# Patient Record
Sex: MALE | Race: ASIAN | NOT HISPANIC OR LATINO
[De-identification: names, ages, dates, MRNs, and addresses within clinical notes are randomized per-mention and may not be internally consistent; named-entity substitution may affect disease eponyms.]

---

## 2017-09-18 ENCOUNTER — APPOINTMENT (OUTPATIENT)
Dept: PULMONOLOGY | Facility: CLINIC | Age: 72
End: 2017-09-18
Payer: MEDICAID

## 2017-09-18 PROCEDURE — G0009: CPT

## 2017-09-18 PROCEDURE — 90472 IMMUNIZATION ADMIN EACH ADD: CPT

## 2017-09-18 PROCEDURE — 90662 IIV NO PRSV INCREASED AG IM: CPT

## 2017-09-18 PROCEDURE — 90670 PCV13 VACCINE IM: CPT

## 2017-09-18 PROCEDURE — 99214 OFFICE O/P EST MOD 30 MIN: CPT | Mod: 25

## 2017-10-11 ENCOUNTER — APPOINTMENT (OUTPATIENT)
Dept: HEART AND VASCULAR | Facility: CLINIC | Age: 72
End: 2017-10-11
Payer: MEDICAID

## 2017-10-11 VITALS — HEART RATE: 73 BPM | DIASTOLIC BLOOD PRESSURE: 73 MMHG | SYSTOLIC BLOOD PRESSURE: 126 MMHG | OXYGEN SATURATION: 99 %

## 2017-10-11 PROCEDURE — 99214 OFFICE O/P EST MOD 30 MIN: CPT

## 2017-10-20 ENCOUNTER — APPOINTMENT (OUTPATIENT)
Dept: PULMONOLOGY | Facility: CLINIC | Age: 72
End: 2017-10-20
Payer: MEDICAID

## 2017-10-20 PROCEDURE — G0008: CPT

## 2017-10-20 PROCEDURE — 90662 IIV NO PRSV INCREASED AG IM: CPT

## 2017-10-20 PROCEDURE — 94010 BREATHING CAPACITY TEST: CPT

## 2017-10-20 PROCEDURE — ZZZZZ: CPT

## 2017-10-20 PROCEDURE — 99214 OFFICE O/P EST MOD 30 MIN: CPT | Mod: 25

## 2017-10-20 PROCEDURE — 94729 DIFFUSING CAPACITY: CPT

## 2017-11-16 ENCOUNTER — FORM ENCOUNTER (OUTPATIENT)
Age: 72
End: 2017-11-16

## 2017-11-17 ENCOUNTER — OUTPATIENT (OUTPATIENT)
Dept: OUTPATIENT SERVICES | Facility: HOSPITAL | Age: 72
LOS: 1 days | End: 2017-11-17
Payer: MEDICAID

## 2017-11-17 DIAGNOSIS — R07.9 CHEST PAIN, UNSPECIFIED: ICD-10-CM

## 2017-11-17 PROCEDURE — A9505: CPT

## 2017-11-17 PROCEDURE — 93017 CV STRESS TEST TRACING ONLY: CPT

## 2017-11-17 PROCEDURE — 78452 HT MUSCLE IMAGE SPECT MULT: CPT

## 2017-11-17 PROCEDURE — 93018 CV STRESS TEST I&R ONLY: CPT

## 2017-11-17 PROCEDURE — 93016 CV STRESS TEST SUPVJ ONLY: CPT

## 2017-11-17 PROCEDURE — 78452 HT MUSCLE IMAGE SPECT MULT: CPT | Mod: 26

## 2017-11-17 PROCEDURE — A9500: CPT

## 2018-02-17 ENCOUNTER — EMERGENCY (EMERGENCY)
Facility: HOSPITAL | Age: 73
LOS: 1 days | Discharge: HOME | End: 2018-02-17
Attending: EMERGENCY MEDICINE | Admitting: EMERGENCY MEDICINE
Payer: MEDICAID

## 2018-02-17 VITALS
TEMPERATURE: 98 F | HEIGHT: 63 IN | DIASTOLIC BLOOD PRESSURE: 67 MMHG | HEART RATE: 110 BPM | SYSTOLIC BLOOD PRESSURE: 109 MMHG | WEIGHT: 138.01 LBS | RESPIRATION RATE: 20 BRPM | OXYGEN SATURATION: 100 %

## 2018-02-17 VITALS
SYSTOLIC BLOOD PRESSURE: 135 MMHG | TEMPERATURE: 98 F | RESPIRATION RATE: 16 BRPM | HEART RATE: 88 BPM | OXYGEN SATURATION: 96 % | DIASTOLIC BLOOD PRESSURE: 79 MMHG

## 2018-02-17 DIAGNOSIS — Z87.891 PERSONAL HISTORY OF NICOTINE DEPENDENCE: ICD-10-CM

## 2018-02-17 DIAGNOSIS — I25.10 ATHEROSCLEROTIC HEART DISEASE OF NATIVE CORONARY ARTERY WITHOUT ANGINA PECTORIS: ICD-10-CM

## 2018-02-17 DIAGNOSIS — Z79.899 OTHER LONG TERM (CURRENT) DRUG THERAPY: ICD-10-CM

## 2018-02-17 DIAGNOSIS — I10 ESSENTIAL (PRIMARY) HYPERTENSION: ICD-10-CM

## 2018-02-17 DIAGNOSIS — Z88.2 ALLERGY STATUS TO SULFONAMIDES: ICD-10-CM

## 2018-02-17 DIAGNOSIS — R05 COUGH: ICD-10-CM

## 2018-02-17 DIAGNOSIS — J44.9 CHRONIC OBSTRUCTIVE PULMONARY DISEASE, UNSPECIFIED: ICD-10-CM

## 2018-02-17 DIAGNOSIS — E78.5 HYPERLIPIDEMIA, UNSPECIFIED: ICD-10-CM

## 2018-02-17 LAB
ALBUMIN SERPL ELPH-MCNC: 4.3 G/DL — SIGNIFICANT CHANGE UP (ref 3.3–5)
ALP SERPL-CCNC: 63 U/L — SIGNIFICANT CHANGE UP (ref 40–120)
ALT FLD-CCNC: 25 U/L — SIGNIFICANT CHANGE UP (ref 10–45)
ANION GAP SERPL CALC-SCNC: 13 MMOL/L — SIGNIFICANT CHANGE UP (ref 5–17)
APTT BLD: 27.7 SEC — SIGNIFICANT CHANGE UP (ref 27.5–37.4)
AST SERPL-CCNC: 23 U/L — SIGNIFICANT CHANGE UP (ref 10–40)
BASOPHILS NFR BLD AUTO: 0.3 % — SIGNIFICANT CHANGE UP (ref 0–2)
BILIRUB SERPL-MCNC: 0.4 MG/DL — SIGNIFICANT CHANGE UP (ref 0.2–1.2)
BUN SERPL-MCNC: 18 MG/DL — SIGNIFICANT CHANGE UP (ref 7–23)
CALCIUM SERPL-MCNC: 9.7 MG/DL — SIGNIFICANT CHANGE UP (ref 8.4–10.5)
CHLORIDE SERPL-SCNC: 91 MMOL/L — LOW (ref 96–108)
CO2 SERPL-SCNC: 27 MMOL/L — SIGNIFICANT CHANGE UP (ref 22–31)
CREAT SERPL-MCNC: 1.39 MG/DL — HIGH (ref 0.5–1.3)
EOSINOPHIL NFR BLD AUTO: 2.2 % — SIGNIFICANT CHANGE UP (ref 0–6)
GLUCOSE SERPL-MCNC: 162 MG/DL — HIGH (ref 70–99)
HCT VFR BLD CALC: 37 % — LOW (ref 39–50)
HGB BLD-MCNC: 12.8 G/DL — LOW (ref 13–17)
INR BLD: 1.16 — SIGNIFICANT CHANGE UP (ref 0.88–1.16)
LYMPHOCYTES # BLD AUTO: 19.7 % — SIGNIFICANT CHANGE UP (ref 13–44)
MCHC RBC-ENTMCNC: 31.1 PG — SIGNIFICANT CHANGE UP (ref 27–34)
MCHC RBC-ENTMCNC: 34.6 G/DL — SIGNIFICANT CHANGE UP (ref 32–36)
MCV RBC AUTO: 90 FL — SIGNIFICANT CHANGE UP (ref 80–100)
MONOCYTES NFR BLD AUTO: 12.2 % — SIGNIFICANT CHANGE UP (ref 2–14)
NEUTROPHILS NFR BLD AUTO: 65.6 % — SIGNIFICANT CHANGE UP (ref 43–77)
PLATELET # BLD AUTO: 197 K/UL — SIGNIFICANT CHANGE UP (ref 150–400)
POTASSIUM SERPL-MCNC: 3.7 MMOL/L — SIGNIFICANT CHANGE UP (ref 3.5–5.3)
POTASSIUM SERPL-SCNC: 3.7 MMOL/L — SIGNIFICANT CHANGE UP (ref 3.5–5.3)
PROT SERPL-MCNC: 7.7 G/DL — SIGNIFICANT CHANGE UP (ref 6–8.3)
PROTHROM AB SERPL-ACNC: 12.9 SEC — HIGH (ref 9.8–12.7)
RAPID RVP RESULT: SIGNIFICANT CHANGE UP
RBC # BLD: 4.11 M/UL — LOW (ref 4.2–5.8)
RBC # FLD: 13 % — SIGNIFICANT CHANGE UP (ref 10.3–16.9)
SODIUM SERPL-SCNC: 131 MMOL/L — LOW (ref 135–145)
WBC # BLD: 9.6 K/UL — SIGNIFICANT CHANGE UP (ref 3.8–10.5)
WBC # FLD AUTO: 9.6 K/UL — SIGNIFICANT CHANGE UP (ref 3.8–10.5)

## 2018-02-17 PROCEDURE — 85610 PROTHROMBIN TIME: CPT

## 2018-02-17 PROCEDURE — 87040 BLOOD CULTURE FOR BACTERIA: CPT

## 2018-02-17 PROCEDURE — 87798 DETECT AGENT NOS DNA AMP: CPT

## 2018-02-17 PROCEDURE — 87633 RESP VIRUS 12-25 TARGETS: CPT

## 2018-02-17 PROCEDURE — 85730 THROMBOPLASTIN TIME PARTIAL: CPT

## 2018-02-17 PROCEDURE — 99285 EMERGENCY DEPT VISIT HI MDM: CPT | Mod: 25

## 2018-02-17 PROCEDURE — 93005 ELECTROCARDIOGRAM TRACING: CPT

## 2018-02-17 PROCEDURE — 96374 THER/PROPH/DIAG INJ IV PUSH: CPT

## 2018-02-17 PROCEDURE — 87486 CHLMYD PNEUM DNA AMP PROBE: CPT

## 2018-02-17 PROCEDURE — 71045 X-RAY EXAM CHEST 1 VIEW: CPT | Mod: 26

## 2018-02-17 PROCEDURE — 94640 AIRWAY INHALATION TREATMENT: CPT

## 2018-02-17 PROCEDURE — 80053 COMPREHEN METABOLIC PANEL: CPT

## 2018-02-17 PROCEDURE — 36415 COLL VENOUS BLD VENIPUNCTURE: CPT

## 2018-02-17 PROCEDURE — 71045 X-RAY EXAM CHEST 1 VIEW: CPT

## 2018-02-17 PROCEDURE — 99285 EMERGENCY DEPT VISIT HI MDM: CPT

## 2018-02-17 PROCEDURE — 87581 M.PNEUMON DNA AMP PROBE: CPT

## 2018-02-17 PROCEDURE — 85025 COMPLETE CBC W/AUTO DIFF WBC: CPT

## 2018-02-17 RX ORDER — IPRATROPIUM/ALBUTEROL SULFATE 18-103MCG
3 AEROSOL WITH ADAPTER (GRAM) INHALATION
Qty: 0 | Refills: 0 | Status: COMPLETED | OUTPATIENT
Start: 2018-02-17 | End: 2018-02-17

## 2018-02-17 RX ORDER — SODIUM CHLORIDE 9 MG/ML
1000 INJECTION INTRAMUSCULAR; INTRAVENOUS; SUBCUTANEOUS ONCE
Qty: 0 | Refills: 0 | Status: COMPLETED | OUTPATIENT
Start: 2018-02-17 | End: 2018-02-17

## 2018-02-17 RX ORDER — ISOSORBIDE MONONITRATE 60 MG/1
1 TABLET, EXTENDED RELEASE ORAL
Qty: 0 | Refills: 0 | COMMUNITY

## 2018-02-17 RX ORDER — LOSARTAN/HYDROCHLOROTHIAZIDE 100MG-25MG
1 TABLET ORAL
Qty: 0 | Refills: 0 | COMMUNITY

## 2018-02-17 RX ADMIN — SODIUM CHLORIDE 1000 MILLILITER(S): 9 INJECTION INTRAMUSCULAR; INTRAVENOUS; SUBCUTANEOUS at 14:20

## 2018-02-17 RX ADMIN — Medication 125 MILLIGRAM(S): at 12:20

## 2018-02-17 RX ADMIN — Medication 3 MILLILITER(S): at 12:31

## 2018-02-17 RX ADMIN — Medication 3 MILLILITER(S): at 12:39

## 2018-02-17 RX ADMIN — Medication 3 MILLILITER(S): at 12:21

## 2018-02-17 NOTE — ED PROVIDER NOTE - OBJECTIVE STATEMENT
73 y/o m with PMH of COPD, HTN, HLD, CAD, TIA presents to ED with cough, mild shortness of breath.  Denies fever or chills.  Presents with son - has been using nebulizer at home with some relief.

## 2018-02-17 NOTE — ED ADULT NURSE NOTE - OBJECTIVE STATEMENT
sick with cough and yellow sputum for 2 weeks and has been on antibiotics - not working - [facility]

## 2018-02-17 NOTE — ED ADULT NURSE REASSESSMENT NOTE - NS ED NURSE REASSESS COMMENT FT1
Patient a/oX 3, symptoms improved, no chest pain or SOB, no wheezing or coughing.  Vital signs stable.  Discharged to home in stable condtion.

## 2018-02-17 NOTE — ED PROVIDER NOTE - MEDICAL DECISION MAKING DETAILS
pt with copd  presents with cough and shortness of breath, pt afebrile workup shows copd exacerbation without infection, feels better with nebs and steroids in ED will follow up with Ede this week

## 2018-02-17 NOTE — ED ADULT NURSE REASSESSMENT NOTE - NURSING SKIN INCISION LOCATION
abdomen/upper incision approximated with staples in place, 1 cm area just above umbilicus open with small amount oozing

## 2018-02-17 NOTE — ED ADULT NURSE NOTE - PMH
Atherosclerosis of coronary artery  Coronary artery disease  Benign prostatic hypertrophy without lower urinary tract symptoms  BPH (benign prostatic hypertrophy)  Chronic obstructive pulmonary disease  COPD (chronic obstructive pulmonary disease)  Constipation  Constipation  Disease of pericardium  Pericardial effusion without cardiac tamponade  Essential hypertension  Hypertension  Hyperlipidemia  Hyperlipidemia  Transient ischemic attack (TIA), and cerebral infarction without residual deficits  History of CVA (cerebrovascular accident) without residual deficits

## 2018-02-22 LAB
CULTURE RESULTS: SIGNIFICANT CHANGE UP
CULTURE RESULTS: SIGNIFICANT CHANGE UP
SPECIMEN SOURCE: SIGNIFICANT CHANGE UP
SPECIMEN SOURCE: SIGNIFICANT CHANGE UP

## 2018-05-08 VITALS
OXYGEN SATURATION: 98 % | TEMPERATURE: 98 F | DIASTOLIC BLOOD PRESSURE: 68 MMHG | SYSTOLIC BLOOD PRESSURE: 105 MMHG | RESPIRATION RATE: 18 BRPM | WEIGHT: 138.89 LBS | HEART RATE: 68 BPM

## 2018-05-08 PROCEDURE — 99285 EMERGENCY DEPT VISIT HI MDM: CPT | Mod: 25

## 2018-05-08 PROCEDURE — 71046 X-RAY EXAM CHEST 2 VIEWS: CPT | Mod: 26

## 2018-05-08 PROCEDURE — 93010 ELECTROCARDIOGRAM REPORT: CPT

## 2018-05-08 RX ORDER — FAMOTIDINE 10 MG/ML
20 INJECTION INTRAVENOUS ONCE
Qty: 0 | Refills: 0 | Status: COMPLETED | OUTPATIENT
Start: 2018-05-08 | End: 2018-05-08

## 2018-05-08 RX ADMIN — Medication 30 MILLILITER(S): at 23:42

## 2018-05-08 RX ADMIN — FAMOTIDINE 20 MILLIGRAM(S): 10 INJECTION INTRAVENOUS at 23:42

## 2018-05-08 NOTE — ED PROVIDER NOTE - OBJECTIVE STATEMENT
73M hx htn, cva (L eye vision loss), BPH, CAD (stents), c/o epigastric pain.  states pain on and off xpast week.  occasional chest pain along with SOB. pt states feeling lightheaded.  no vomiting, no fevers. no diarrhea. no LE swelling. states has appt with Dr. Quiroga tomorrow.

## 2018-05-08 NOTE — ED PROVIDER NOTE - MEDICAL DECISION MAKING DETAILS
epigastric abd pain, intermittent chest pain x1 week  -check labs, pepcid, maalox, cxr, ekg epigastric abd pain, intermittent chest pain x1 week, no guarding no rebound, no evidence of surgical abd at this time.  -check labs, pepcid, maalox, cxr, ekg

## 2018-05-08 NOTE — ED ADULT NURSE NOTE - OBJECTIVE STATEMENT
72 y/o male with hx of cardiac stents arrived to [facility] ER reporting intermittent chest pain accompanied with abdominal pain for the past week. Upon assessment, abdomen tender, lung fields WNL, breathing is equal and unlabored, pulses palpable, no visible injuries noted. Pt denies headache, dizziness, blurred visio, slurred speech, diarrhea, fever, chills, LOC, SOB, weakness, fatigue, recent travel, recent injury, and palpitations. Care in progress. Awaiting disposition

## 2018-05-08 NOTE — ED PROVIDER NOTE - PROGRESS NOTE DETAILS
spoke with Dr. Quiroga - recommend admission to tele for continued monitoring.  states will see him in the AM and will likely get CT coronary angio.

## 2018-05-08 NOTE — ED PROVIDER NOTE - PSH
Acquired absence of genital organ  S/P orchiectomy  H/O heart artery stent    Other postprocedural status  Cath @ [facility] 5/19/14: 80% pLAD, 70% oD1, widely patent stents in mLCx, RPDA & RPLS. Successful PTCA KARAN pLAD & PTCA ostial D1.  Status post aorto-coronary artery bypass graft  LIMA - LAD midCAB 2/2014 @ [facility]

## 2018-05-09 ENCOUNTER — TRANSCRIPTION ENCOUNTER (OUTPATIENT)
Age: 73
End: 2018-05-09

## 2018-05-09 ENCOUNTER — INPATIENT (INPATIENT)
Facility: HOSPITAL | Age: 73
LOS: 0 days | Discharge: HOME | DRG: 392 | End: 2018-05-09
Attending: INTERNAL MEDICINE | Admitting: INTERNAL MEDICINE
Payer: MEDICAID

## 2018-05-09 VITALS
RESPIRATION RATE: 16 BRPM | DIASTOLIC BLOOD PRESSURE: 69 MMHG | OXYGEN SATURATION: 97 % | HEART RATE: 88 BPM | SYSTOLIC BLOOD PRESSURE: 105 MMHG

## 2018-05-09 DIAGNOSIS — R63.8 OTHER SYMPTOMS AND SIGNS CONCERNING FOOD AND FLUID INTAKE: ICD-10-CM

## 2018-05-09 DIAGNOSIS — J44.9 CHRONIC OBSTRUCTIVE PULMONARY DISEASE, UNSPECIFIED: ICD-10-CM

## 2018-05-09 DIAGNOSIS — Z02.9 ENCOUNTER FOR ADMINISTRATIVE EXAMINATIONS, UNSPECIFIED: ICD-10-CM

## 2018-05-09 DIAGNOSIS — Z29.8 ENCOUNTER FOR OTHER SPECIFIED PROPHYLACTIC MEASURES: ICD-10-CM

## 2018-05-09 DIAGNOSIS — R10.13 EPIGASTRIC PAIN: ICD-10-CM

## 2018-05-09 DIAGNOSIS — I10 ESSENTIAL (PRIMARY) HYPERTENSION: ICD-10-CM

## 2018-05-09 DIAGNOSIS — I25.10 ATHEROSCLEROTIC HEART DISEASE OF NATIVE CORONARY ARTERY WITHOUT ANGINA PECTORIS: ICD-10-CM

## 2018-05-09 DIAGNOSIS — N40.0 BENIGN PROSTATIC HYPERPLASIA WITHOUT LOWER URINARY TRACT SYMPTOMS: ICD-10-CM

## 2018-05-09 DIAGNOSIS — K59.00 CONSTIPATION, UNSPECIFIED: ICD-10-CM

## 2018-05-09 DIAGNOSIS — Z95.5 PRESENCE OF CORONARY ANGIOPLASTY IMPLANT AND GRAFT: Chronic | ICD-10-CM

## 2018-05-09 LAB
ANION GAP SERPL CALC-SCNC: 15 MMOL/L — SIGNIFICANT CHANGE UP (ref 5–17)
APPEARANCE UR: CLEAR — SIGNIFICANT CHANGE UP
BILIRUB UR-MCNC: NEGATIVE — SIGNIFICANT CHANGE UP
BUN SERPL-MCNC: 18 MG/DL — SIGNIFICANT CHANGE UP (ref 7–23)
CALCIUM SERPL-MCNC: 9.1 MG/DL — SIGNIFICANT CHANGE UP (ref 8.4–10.5)
CHLORIDE SERPL-SCNC: 98 MMOL/L — SIGNIFICANT CHANGE UP (ref 96–108)
CHOLEST SERPL-MCNC: 107 MG/DL — SIGNIFICANT CHANGE UP (ref 10–199)
CK MB CFR SERPL CALC: 2 NG/ML — SIGNIFICANT CHANGE UP (ref 0–6.7)
CK SERPL-CCNC: 156 U/L — SIGNIFICANT CHANGE UP (ref 30–200)
CO2 SERPL-SCNC: 23 MMOL/L — SIGNIFICANT CHANGE UP (ref 22–31)
COLOR SPEC: YELLOW — SIGNIFICANT CHANGE UP
CREAT ?TM UR-MCNC: 66 MG/DL — SIGNIFICANT CHANGE UP
CREAT SERPL-MCNC: 1.36 MG/DL — HIGH (ref 0.5–1.3)
DIFF PNL FLD: NEGATIVE — SIGNIFICANT CHANGE UP
GLUCOSE SERPL-MCNC: 108 MG/DL — HIGH (ref 70–99)
GLUCOSE UR QL: NEGATIVE — SIGNIFICANT CHANGE UP
HBA1C BLD-MCNC: 5.4 % — SIGNIFICANT CHANGE UP (ref 4–5.6)
HCT VFR BLD CALC: 39 % — SIGNIFICANT CHANGE UP (ref 39–50)
HDLC SERPL-MCNC: 44 MG/DL — SIGNIFICANT CHANGE UP (ref 40–125)
HGB BLD-MCNC: 13.2 G/DL — SIGNIFICANT CHANGE UP (ref 13–17)
KETONES UR-MCNC: NEGATIVE — SIGNIFICANT CHANGE UP
LEUKOCYTE ESTERASE UR-ACNC: NEGATIVE — SIGNIFICANT CHANGE UP
LIPID PNL WITH DIRECT LDL SERPL: 49 MG/DL — SIGNIFICANT CHANGE UP
MAGNESIUM SERPL-MCNC: 2 MG/DL — SIGNIFICANT CHANGE UP (ref 1.6–2.6)
MCHC RBC-ENTMCNC: 30.6 PG — SIGNIFICANT CHANGE UP (ref 27–34)
MCHC RBC-ENTMCNC: 33.8 G/DL — SIGNIFICANT CHANGE UP (ref 32–36)
MCV RBC AUTO: 90.3 FL — SIGNIFICANT CHANGE UP (ref 80–100)
NITRITE UR-MCNC: NEGATIVE — SIGNIFICANT CHANGE UP
PH UR: 6 — SIGNIFICANT CHANGE UP (ref 5–8)
PHOSPHATE SERPL-MCNC: 3.8 MG/DL — SIGNIFICANT CHANGE UP (ref 2.5–4.5)
PLATELET # BLD AUTO: 192 K/UL — SIGNIFICANT CHANGE UP (ref 150–400)
POTASSIUM SERPL-MCNC: 3.8 MMOL/L — SIGNIFICANT CHANGE UP (ref 3.5–5.3)
POTASSIUM SERPL-SCNC: 3.8 MMOL/L — SIGNIFICANT CHANGE UP (ref 3.5–5.3)
PROT UR-MCNC: NEGATIVE MG/DL — SIGNIFICANT CHANGE UP
RBC # BLD: 4.32 M/UL — SIGNIFICANT CHANGE UP (ref 4.2–5.8)
RBC # FLD: 13 % — SIGNIFICANT CHANGE UP (ref 10.3–16.9)
SODIUM SERPL-SCNC: 136 MMOL/L — SIGNIFICANT CHANGE UP (ref 135–145)
SODIUM UR-SCNC: 108 MMOL/L — SIGNIFICANT CHANGE UP
SP GR SPEC: 1.01 — SIGNIFICANT CHANGE UP (ref 1–1.03)
TOTAL CHOLESTEROL/HDL RATIO MEASUREMENT: 2.4 RATIO — LOW (ref 3.4–9.6)
TRIGL SERPL-MCNC: 69 MG/DL — SIGNIFICANT CHANGE UP (ref 10–149)
TROPONIN T SERPL-MCNC: <0.01 NG/ML — SIGNIFICANT CHANGE UP (ref 0–0.01)
TSH SERPL-MCNC: 2.23 UIU/ML — SIGNIFICANT CHANGE UP (ref 0.35–4.94)
UROBILINOGEN FLD QL: 0.2 E.U./DL — SIGNIFICANT CHANGE UP
UUN UR-MCNC: 351 MG/DL — SIGNIFICANT CHANGE UP
WBC # BLD: 6.4 K/UL — SIGNIFICANT CHANGE UP (ref 3.8–10.5)
WBC # FLD AUTO: 6.4 K/UL — SIGNIFICANT CHANGE UP (ref 3.8–10.5)

## 2018-05-09 PROCEDURE — 74177 CT ABD & PELVIS W/CONTRAST: CPT | Mod: 26

## 2018-05-09 PROCEDURE — 99238 HOSP IP/OBS DSCHRG MGMT 30/<: CPT

## 2018-05-09 RX ORDER — DIATRIZOATE MEGLUMINE 180 MG/ML
30 INJECTION, SOLUTION INTRAVESICAL ONCE
Qty: 0 | Refills: 0 | Status: COMPLETED | OUTPATIENT
Start: 2018-05-09 | End: 2018-05-09

## 2018-05-09 RX ORDER — IPRATROPIUM/ALBUTEROL SULFATE 18-103MCG
3 AEROSOL WITH ADAPTER (GRAM) INHALATION EVERY 6 HOURS
Qty: 0 | Refills: 0 | Status: DISCONTINUED | OUTPATIENT
Start: 2018-05-09 | End: 2018-05-09

## 2018-05-09 RX ORDER — FUROSEMIDE 40 MG
40 TABLET ORAL DAILY
Qty: 0 | Refills: 0 | Status: DISCONTINUED | OUTPATIENT
Start: 2018-05-09 | End: 2018-05-09

## 2018-05-09 RX ORDER — CLOPIDOGREL BISULFATE 75 MG/1
75 TABLET, FILM COATED ORAL DAILY
Qty: 0 | Refills: 0 | Status: DISCONTINUED | OUTPATIENT
Start: 2018-05-09 | End: 2018-05-09

## 2018-05-09 RX ORDER — METOPROLOL TARTRATE 50 MG
100 TABLET ORAL DAILY
Qty: 0 | Refills: 0 | Status: DISCONTINUED | OUTPATIENT
Start: 2018-05-09 | End: 2018-05-09

## 2018-05-09 RX ORDER — HEPARIN SODIUM 5000 [USP'U]/ML
5000 INJECTION INTRAVENOUS; SUBCUTANEOUS EVERY 8 HOURS
Qty: 0 | Refills: 0 | Status: DISCONTINUED | OUTPATIENT
Start: 2018-05-09 | End: 2018-05-09

## 2018-05-09 RX ORDER — ASPIRIN/CALCIUM CARB/MAGNESIUM 324 MG
81 TABLET ORAL DAILY
Qty: 0 | Refills: 0 | Status: DISCONTINUED | OUTPATIENT
Start: 2018-05-09 | End: 2018-05-09

## 2018-05-09 RX ORDER — MONTELUKAST 4 MG/1
10 TABLET, CHEWABLE ORAL DAILY
Qty: 0 | Refills: 0 | Status: DISCONTINUED | OUTPATIENT
Start: 2018-05-09 | End: 2018-05-09

## 2018-05-09 RX ORDER — RANOLAZINE 500 MG/1
500 TABLET, FILM COATED, EXTENDED RELEASE ORAL
Qty: 0 | Refills: 0 | Status: DISCONTINUED | OUTPATIENT
Start: 2018-05-09 | End: 2018-05-09

## 2018-05-09 RX ORDER — LOSARTAN POTASSIUM 100 MG/1
100 TABLET, FILM COATED ORAL DAILY
Qty: 0 | Refills: 0 | Status: DISCONTINUED | OUTPATIENT
Start: 2018-05-09 | End: 2018-05-09

## 2018-05-09 RX ORDER — TAMSULOSIN HYDROCHLORIDE 0.4 MG/1
0.8 CAPSULE ORAL AT BEDTIME
Qty: 0 | Refills: 0 | Status: DISCONTINUED | OUTPATIENT
Start: 2018-05-09 | End: 2018-05-09

## 2018-05-09 RX ORDER — HYDROCHLOROTHIAZIDE 25 MG
12.5 TABLET ORAL DAILY
Qty: 0 | Refills: 0 | Status: DISCONTINUED | OUTPATIENT
Start: 2018-05-09 | End: 2018-05-09

## 2018-05-09 RX ORDER — PANTOPRAZOLE SODIUM 20 MG/1
1 TABLET, DELAYED RELEASE ORAL
Qty: 0 | Refills: 0 | COMMUNITY
Start: 2018-05-09

## 2018-05-09 RX ORDER — PANTOPRAZOLE SODIUM 20 MG/1
40 TABLET, DELAYED RELEASE ORAL
Qty: 0 | Refills: 0 | Status: DISCONTINUED | OUTPATIENT
Start: 2018-05-09 | End: 2018-05-09

## 2018-05-09 RX ORDER — POLYETHYLENE GLYCOL 3350 17 G/17G
17 POWDER, FOR SOLUTION ORAL DAILY
Qty: 0 | Refills: 0 | Status: DISCONTINUED | OUTPATIENT
Start: 2018-05-09 | End: 2018-05-09

## 2018-05-09 RX ORDER — ISOSORBIDE MONONITRATE 60 MG/1
30 TABLET, EXTENDED RELEASE ORAL DAILY
Qty: 0 | Refills: 0 | Status: DISCONTINUED | OUTPATIENT
Start: 2018-05-09 | End: 2018-05-09

## 2018-05-09 RX ORDER — POTASSIUM CHLORIDE 20 MEQ
40 PACKET (EA) ORAL ONCE
Qty: 0 | Refills: 0 | Status: COMPLETED | OUTPATIENT
Start: 2018-05-09 | End: 2018-05-09

## 2018-05-09 RX ORDER — ATORVASTATIN CALCIUM 80 MG/1
80 TABLET, FILM COATED ORAL AT BEDTIME
Qty: 0 | Refills: 0 | Status: DISCONTINUED | OUTPATIENT
Start: 2018-05-09 | End: 2018-05-09

## 2018-05-09 RX ORDER — ACETAMINOPHEN 500 MG
650 TABLET ORAL EVERY 6 HOURS
Qty: 0 | Refills: 0 | Status: DISCONTINUED | OUTPATIENT
Start: 2018-05-09 | End: 2018-05-09

## 2018-05-09 RX ADMIN — Medication 650 MILLIGRAM(S): at 01:44

## 2018-05-09 RX ADMIN — HEPARIN SODIUM 5000 UNIT(S): 5000 INJECTION INTRAVENOUS; SUBCUTANEOUS at 21:32

## 2018-05-09 RX ADMIN — POLYETHYLENE GLYCOL 3350 17 GRAM(S): 17 POWDER, FOR SOLUTION ORAL at 01:44

## 2018-05-09 RX ADMIN — Medication 3 MILLILITER(S): at 04:40

## 2018-05-09 RX ADMIN — TAMSULOSIN HYDROCHLORIDE 0.8 MILLIGRAM(S): 0.4 CAPSULE ORAL at 21:32

## 2018-05-09 RX ADMIN — HEPARIN SODIUM 5000 UNIT(S): 5000 INJECTION INTRAVENOUS; SUBCUTANEOUS at 16:08

## 2018-05-09 RX ADMIN — Medication 12.5 MILLIGRAM(S): at 05:38

## 2018-05-09 RX ADMIN — Medication 81 MILLIGRAM(S): at 10:39

## 2018-05-09 RX ADMIN — DIATRIZOATE MEGLUMINE 30 MILLILITER(S): 180 INJECTION, SOLUTION INTRAVESICAL at 12:21

## 2018-05-09 RX ADMIN — Medication 3 MILLILITER(S): at 21:37

## 2018-05-09 RX ADMIN — RANOLAZINE 500 MILLIGRAM(S): 500 TABLET, FILM COATED, EXTENDED RELEASE ORAL at 19:28

## 2018-05-09 RX ADMIN — Medication 40 MILLIEQUIVALENT(S): at 19:28

## 2018-05-09 RX ADMIN — Medication 40 MILLIGRAM(S): at 05:38

## 2018-05-09 RX ADMIN — PANTOPRAZOLE SODIUM 40 MILLIGRAM(S): 20 TABLET, DELAYED RELEASE ORAL at 05:38

## 2018-05-09 RX ADMIN — Medication 650 MILLIGRAM(S): at 02:15

## 2018-05-09 RX ADMIN — CLOPIDOGREL BISULFATE 75 MILLIGRAM(S): 75 TABLET, FILM COATED ORAL at 10:39

## 2018-05-09 RX ADMIN — HEPARIN SODIUM 5000 UNIT(S): 5000 INJECTION INTRAVENOUS; SUBCUTANEOUS at 05:39

## 2018-05-09 RX ADMIN — Medication 100 MILLIGRAM(S): at 19:28

## 2018-05-09 RX ADMIN — RANOLAZINE 500 MILLIGRAM(S): 500 TABLET, FILM COATED, EXTENDED RELEASE ORAL at 05:38

## 2018-05-09 RX ADMIN — LOSARTAN POTASSIUM 100 MILLIGRAM(S): 100 TABLET, FILM COATED ORAL at 10:39

## 2018-05-09 RX ADMIN — MONTELUKAST 10 MILLIGRAM(S): 4 TABLET, CHEWABLE ORAL at 19:28

## 2018-05-09 RX ADMIN — ISOSORBIDE MONONITRATE 30 MILLIGRAM(S): 60 TABLET, EXTENDED RELEASE ORAL at 10:39

## 2018-05-09 RX ADMIN — ATORVASTATIN CALCIUM 80 MILLIGRAM(S): 80 TABLET, FILM COATED ORAL at 21:32

## 2018-05-09 RX ADMIN — Medication 3 MILLILITER(S): at 10:40

## 2018-05-09 NOTE — DISCHARGE NOTE ADULT - PLAN OF CARE
Follow up with your PMD in 1-2 weeks You came into the hospital for epigastric pain. You had bloodwork and EKGs performed that DID NOT show you had a heart attack. You came into the hospital for epigastric pain. You had bloodwork and EKGs performed that DID NOT show you had a heart attack. You had a CT Abdomen/Pelvis performed. You came into the hospital for epigastric pain. You had bloodwork and EKGs performed that DID NOT show you had a heart attack. You had a CT Abdomen/Pelvis performed. Preliminary read showing no liver mass or lesion, small gallstones present in the gall bladder without obstruction or dilation, pancreas without inflammation or signs of pancreatitis, no spleen abnormalities, small renal cyst on R kidney, no hydronephrosis or ureteral stones, no colonic abnormalities.

## 2018-05-09 NOTE — PHYSICAL THERAPY INITIAL EVALUATION ADULT - CRITERIA FOR SKILLED THERAPEUTIC INTERVENTIONS
anticipated discharge recommendation/rehab potential/functional limitations in following categories/therapy frequency/impairments found

## 2018-05-09 NOTE — H&P ADULT - PROBLEM SELECTOR PLAN 2
Patient with history of CAD s/p multiple stents and CABG in 2014 presenting with intermittent chest pain/abdominal pain with occasional SOB. Given history concern for abnormal presentation of ACS.  - c/w Aspirin, Plavix, Atorvastatin  - c/w Lasix 40mg, HCTZ 12.5mg, Losartan 100mg, Imdur 30mg, Metoprolol 100mg  - Trend cardiac enzymes to ensure no elevation  - If worsening CP or increase suspicion for ACS will Aspirin and Plavix load  - f/u AM EKG  - CT coronary in AM

## 2018-05-09 NOTE — H&P ADULT - PROBLEM SELECTOR PLAN 1
Patient presenting with epigastric/chest pain, non-radiating, vague in nature and unable to further describe quality/quantity. Does not appear to be exertional in nature, though some associated shortness of breath. Pain reproducible with palpation on examination, mild elevation of lipase. Broad differential and given cardiac history concern for atypical presentation of ACS.  - Given Maalox and Famotidine in ED with mild relief  - Cardiac enzymes negative x1, trend to ensure no elevation  - Miralax for constipation relief  - Obtain CT abdomen/pelvis if concern for pancreatitis (though only mild Lipase elevation suggestive of alternative etiology)  - Trial of PPI

## 2018-05-09 NOTE — H&P ADULT - NSHPLABSRESULTS_GEN_ALL_CORE
.  LABS:                         13.5   7.3   )-----------( 202      ( 08 May 2018 22:43 )             38.8     05-08    137  |  100  |  18  ----------------------------<  119<H>  4.1   |  26  |  1.47<H>    Ca    9.4      08 May 2018 22:43    TPro  6.9  /  Alb  4.1  /  TBili  0.5  /  DBili  x   /  AST  30  /  ALT  24  /  AlkPhos  63  05-08    PT/INR - ( 08 May 2018 22:43 )   PT: 11.9 sec;   INR: 1.07          PTT - ( 08 May 2018 22:43 )  PTT:30.6 sec  Urinalysis Basic - ( 09 May 2018 00:43 )    Color: Yellow / Appearance: SL Cloudy / SG: <=1.005 / pH: x  Gluc: x / Ketone: NEGATIVE  / Bili: Negative / Urobili: 0.2 E.U./dL   Blood: x / Protein: NEGATIVE mg/dL / Nitrite: NEGATIVE   Leuk Esterase: NEGATIVE / RBC: x / WBC x   Sq Epi: x / Non Sq Epi: x / Bacteria: x      CARDIAC MARKERS ( 08 May 2018 22:43 )  x     / <0.01 ng/mL / 191 U/L / x     / 2.1 ng/mL            RADIOLOGY, EKG & ADDITIONAL TESTS: Reviewed.

## 2018-05-09 NOTE — H&P ADULT - ATTENDING COMMENTS
-pt seen and examined, atypical pain syndrome- he points to low mid epigastric area on my exam that is slightly worse to palpiations.   -the pain is not related to exertion, and/or food  -his cardiac enzymes are negative/ EKG unchanged   -doubt cardiac pain, to get a CT abd/ GI evaluation- ?pud

## 2018-05-09 NOTE — DISCHARGE NOTE ADULT - CARE PLAN
Principal Discharge DX:	Epigastric pain  Goal:	Follow up with your PMD in 1-2 weeks  Assessment and plan of treatment:	You came into the hospital for epigastric pain. You had bloodwork and EKGs performed that DID NOT show you had a heart attack. Principal Discharge DX:	Epigastric pain  Goal:	Follow up with your PMD in 1-2 weeks  Assessment and plan of treatment:	You came into the hospital for epigastric pain. You had bloodwork and EKGs performed that DID NOT show you had a heart attack. You had a CT Abdomen/Pelvis performed. Principal Discharge DX:	Epigastric pain  Goal:	Follow up with your PMD in 1-2 weeks  Assessment and plan of treatment:	You came into the hospital for epigastric pain. You had bloodwork and EKGs performed that DID NOT show you had a heart attack. You had a CT Abdomen/Pelvis performed. Preliminary read showing no liver mass or lesion, small gallstones present in the gall bladder without obstruction or dilation, pancreas without inflammation or signs of pancreatitis, no spleen abnormalities, small renal cyst on R kidney, no hydronephrosis or ureteral stones, no colonic abnormalities.

## 2018-05-09 NOTE — H&P ADULT - PROBLEM SELECTOR PLAN 3
Patient with history of HTN Patient with history of HTN on multiple agents at home per med requisition, currently normo/hypotensive with adequate MAPs  - c/w Lasix 40mg, HCTZ 12.5mg, Losartan 100mg, Imdur 30mg, Metoprolol 100mg

## 2018-05-09 NOTE — H&P ADULT - ASSESSMENT
Patient is a 72yo M with a PMHx of HTN, CVA w/residual L sided vision loss, BPH, CAD s/p CABG and stenting who presented complaining of chest pain/epigastric pain on and off over the past week, concerning for atypical presentation of ACS given cardiac history

## 2018-05-09 NOTE — H&P ADULT - HISTORY OF PRESENT ILLNESS
Patient is a Bengalese 72yo M with a PMHx of HTN, CVA w/residual L sided vision loss, BPH, CAD s/p CABG and stenting who presented complaining of chest pain/epigastric pain on and off over the past week. Location of pain varies, from chest to abdomen though does not radiate to neck/L arm. States some occasional assocated SOB and lightheadedness. Patient denies fevers, chills, cough, palpitations, abdominal distention, n/v/d/c, lower extremity edema, decreased exercise tolerance or orthopnea.    In the ED, VS T 97.7, HR 68, /68, R 18, SpO2 98% on room air. Patient without leukocytosis or anemia, normal coags, no electrolytes disturbances, Cr of 1.47, lipase of 91, negative cardiac enzymes, negative UA. CXR without infiltrate or effusion, EKG with TWI in lead I but otherwise unchanged from prior studies. Patient given Maalox and Famotidine and admitted to [facility] to rule out ACS.

## 2018-05-09 NOTE — H&P ADULT - NSHPPHYSICALEXAM_GEN_ALL_CORE
.  VITAL SIGNS:  T(C): 36.6 (05-09-18 @ 01:47), Max: 36.7 (05-09-18 @ 01:08)  T(F): 97.8 (05-09-18 @ 01:47), Max: 98 (05-09-18 @ 01:08)  HR: 64 (05-09-18 @ 01:08) (64 - 68)  BP: 105/59 (05-09-18 @ 01:08) (105/59 - 110/59)  BP(mean): 73 (05-09-18 @ 01:08) (73 - 73)  RR: 18 (05-09-18 @ 01:08) (18 - 18)  SpO2: 98% (05-09-18 @ 01:08) (98% - 98%)  Wt(kg): --    PHYSICAL EXAM:    Constitutional: Resting in bed, NAD though occasional moaning to indicate pain  Head: NC/AT  Eyes: PERRL, EOMI, anicteric sclera  ENT: no nasal discharge; uvula midline, no oropharyngeal erythema or exudates; MMM  Neck: supple; no JVD or thyromegaly  Respiratory: Faint bibasilar crackles, b/l expiratory wheezing  Cardiac: +S1/S2; RRR; no M/R/G; PMI non-displaced  Gastrointestinal: Mildly distended, dull to tympany, TTP in the epigastric region without rebound or guarding, +BS x4  Back: spine midline, no bony tenderness or step-offs; no CVAT B/L  Extremities: WWP, no clubbing or cyanosis; no peripheral edema  Musculoskeletal: NROM x4; no joint swelling, tenderness or erythema  Vascular: 2+ radial, femoral, DP/PT pulses B/L  Dermatologic: skin warm, dry and intact; no rashes, wounds, or scars  Lymphatic: no submandibular or cervical LAD

## 2018-05-09 NOTE — H&P ADULT - PSH
Acquired absence of genital organ  S/P orchiectomy  H/O heart artery stent    Other postprocedural status  Cath @ [facility] 5/19/14: 80% pLAD, 70% oD1, widely patent stents in mLCx, RPDA & RPLS. Successful PTCA KARAN pLAD & PTCA ostial D1.  Status post aorto-coronary artery bypass graft  LIMA - LAD midCAB 2/2014 @ [facility]

## 2018-05-09 NOTE — DISCHARGE NOTE ADULT - ADDITIONAL INSTRUCTIONS
1. Please follow up with your PMD in 1-2 weeks after discharge.    2. Please follow up with Dr Quiroga in 1-2 weeks.  Alvarez Quiroga (MD), Cardiology; Interventional Cardiology  [address] [place], [place] [address]  Phone: [phone] 1. Please follow up with your PMD and Gastroenterologist in 1-2 weeks after discharge.    2. Please follow up with Dr Quiroga in 1-2 weeks.  Alvarez Quiroga (MD), Cardiology; Interventional Cardiology  [address] [place], [place] [address]  Phone: [phone]

## 2018-05-09 NOTE — DISCHARGE NOTE ADULT - MEDICATION SUMMARY - MEDICATIONS TO TAKE
I will START or STAY ON the medications listed below when I get home from the hospital:    Ecotrin Adult Low Strength 81 mg oral delayed release tablet  -- 1 tab(s) by mouth once a day  -- Indication: For Coronary artery disease    tamsulosin 0.4 mg oral capsule  -- 2 cap(s) by mouth once a day  -- Indication: For BPH (benign prostatic hyperplasia)    Ranexa 500 mg oral tablet, extended release  -- 1 tab(s) by mouth 2 times a day  -- Indication: For Coronary artery disease    isosorbide mononitrate 30 mg oral tablet, extended release  -- 1 tab(s) by mouth once a day (in the morning)  -- Indication: For Coronary artery disease    Lipitor 80 mg oral tablet  -- 1 tab(s) by mouth once a day (at bedtime)  -- Indication: For Coronary artery disease    losartan-hydrochlorothiazide 100mg-12.5mg oral tablet  -- 1 tab(s) by mouth once a day  -- Indication: For Essential hypertension    Plavix 75 mg oral tablet  -- 1 tab(s) by mouth once a day  -- Indication: For Coronary artery disease    metoprolol succinate 100 mg oral tablet, extended release  -- 1 tab(s) by mouth once a day  -- Indication: For Coronary artery disease    furosemide 40 mg oral tablet  -- 1 tab(s) by mouth once a day  -- Indication: For Coronary artery disease    Singulair 10 mg oral tablet  -- 1 tab(s) by mouth once a day  -- Indication: For Chronic obstructive pulmonary disease    pantoprazole 40 mg oral delayed release tablet  -- 1 tab(s) by mouth once a day (before a meal)  -- Indication: For Epigastric pain    Daliresp 500 mcg oral tablet  -- 1 tab(s) by mouth once a day  -- Indication: For Chronic obstructive pulmonary disease

## 2018-05-09 NOTE — DISCHARGE NOTE ADULT - CARE PROVIDER_API CALL
Alvarez Quiroga (MD), Cardiology; Interventional Cardiology  [address] [place] [address]  Phone: [phone]  Fax: [phone]

## 2018-05-09 NOTE — DISCHARGE NOTE ADULT - HOSPITAL COURSE
[facility] 72[facility] with a PMHx of HTN, CVA w/residual L sided vision loss, BPH, CAD s/p CABG (LIMA to LAD 2/2014 - known to be atretic), most recent cardiac cath KARAN D1 on 9/15/15 at [facility] who presented complaining of chest pain/epigastric pain on and off over the past week. Location of pain varies, from chest to abdomen though does not radiate to neck/L arm. States some occasional assocated SOB and lightheadedness. Patient denies fevers, chills, cough, palpitations, abdominal distention, n/v/d/c, lower extremity edema, decreased exercise tolerance or orthopnea.    In the ED, VS T 97.7, HR 68, /68, R 18, SpO2 98% on room air. Patient without leukocytosis or anemia, normal coags, no electrolytes disturbances, Cr of 1.47, lipase of 91, negative cardiac enzymes, negative UA. CXR without infiltrate or effusion, EKG with TWI in lead I but otherwise unchanged from prior studies. Patient given Maalox and Famotidine and admitted to [facility] to rule out ACS. 74[facility] with PMHx of ex-smoker x 5mos, CAD s/p CABG (LIMA to LAD 2/2014 - known to be atretic), most recent cardiac cath KARAN D1 on 9/15/15 at [facility], HTN, CVA w/ residual L sided vision loss, BPH, who presented complaining of epigastric pain x1 month when coughing. Reports coughing x 3-4months. Denies fevers, chills, palpitations, abdominal distention, n/v/d/c, lower extremity edema, decreased exercise tolerance or orthopnea. In the ED, VS T 97.7, HR 68, /68, R 18, SpO2 98% RA.  lipase 91. CXR without infiltrate or effusion, EKG with TWI in lead I but otherwise unchanged from prior studies. Patient given Maalox and Famotidine and admitted to tele [facility] for aytpical chest pain. Pt was ruled out for ACS w/ Trop neg x2, EKG w/o acute ischemic changes. Upon chart review, noted last cath 2016 w/ widely patent stents mLAD, D1, PRDA, PRL, other areas w/ luminal irregularities, Nuclear stress test performed 11/2017 was negative for ischemia. Pt underwent CT Abd/pelvis for epigastric pain. 72[facility] with PMHx of ex-smoker x 5mos, CAD s/p CABG (LIMA to LAD 2/2014 - known to be atretic), most recent cardiac cath KARAN D1 on 9/15/15 at [facility], HTN, CVA w/ residual L sided vision loss, BPH, who presented complaining of epigastric pain x1 month when coughing. Reports coughing x 3-4months. Denies fevers, chills, palpitations, abdominal distention, n/v/d/c, lower extremity edema, decreased exercise tolerance or orthopnea. In the ED, VS T 97.7, HR 68, /68, R 18, SpO2 98% RA.  lipase 91. CXR without infiltrate or effusion, EKG with TWI in lead I but otherwise unchanged from prior studies. Patient given Maalox and Famotidine and admitted to tele [facility] for aytpical chest pain. Pt was ruled out for ACS w/ Trop neg x2, EKG w/o acute ischemic changes. Upon chart review, noted last cath 2016 w/ widely patent stents mLAD, D1, PRDA, PRL, other areas w/ luminal irregularities, Nuclear stress test performed 11/2017 was negative for ischemia. Pt underwent CT Abd/pelvis for epigastric pain with findings as detailed above.

## 2018-05-09 NOTE — PHYSICAL THERAPY INITIAL EVALUATION ADULT - GENERAL OBSERVATIONS, REHAB EVAL
patient received supine complaints of abdominal pain +EKG, IV hep. Left as found +RN Eleonora aware, call vaca in reach

## 2018-05-09 NOTE — DISCHARGE NOTE ADULT - PATIENT PORTAL LINK FT
You can access the [facility] Portal, offered by [facility], by registering with the following website: [email or web address]

## 2018-05-09 NOTE — H&P ADULT - PROBLEM SELECTOR PLAN 6
Patient with history of constipation, abdomen mildly distended on examination, still passing gas and stool.  - Miralax QD

## 2018-05-14 DIAGNOSIS — Z90.79 ACQUIRED ABSENCE OF OTHER GENITAL ORGAN(S): ICD-10-CM

## 2018-05-14 DIAGNOSIS — Z79.02 LONG TERM (CURRENT) USE OF ANTITHROMBOTICS/ANTIPLATELETS: ICD-10-CM

## 2018-05-14 DIAGNOSIS — H54.62 UNQUALIFIED VISUAL LOSS, LEFT EYE, NORMAL VISION RIGHT EYE: ICD-10-CM

## 2018-05-14 DIAGNOSIS — J44.9 CHRONIC OBSTRUCTIVE PULMONARY DISEASE, UNSPECIFIED: ICD-10-CM

## 2018-05-14 DIAGNOSIS — Z95.1 PRESENCE OF AORTOCORONARY BYPASS GRAFT: ICD-10-CM

## 2018-05-14 DIAGNOSIS — Z79.82 LONG TERM (CURRENT) USE OF ASPIRIN: ICD-10-CM

## 2018-05-14 DIAGNOSIS — I10 ESSENTIAL (PRIMARY) HYPERTENSION: ICD-10-CM

## 2018-05-14 DIAGNOSIS — Z95.5 PRESENCE OF CORONARY ANGIOPLASTY IMPLANT AND GRAFT: ICD-10-CM

## 2018-05-14 DIAGNOSIS — N40.0 BENIGN PROSTATIC HYPERPLASIA WITHOUT LOWER URINARY TRACT SYMPTOMS: ICD-10-CM

## 2018-05-14 DIAGNOSIS — R10.13 EPIGASTRIC PAIN: ICD-10-CM

## 2018-05-14 DIAGNOSIS — Z88.2 ALLERGY STATUS TO SULFONAMIDES: ICD-10-CM

## 2018-05-14 DIAGNOSIS — J90 PLEURAL EFFUSION, NOT ELSEWHERE CLASSIFIED: ICD-10-CM

## 2018-05-14 DIAGNOSIS — K80.20 CALCULUS OF GALLBLADDER WITHOUT CHOLECYSTITIS WITHOUT OBSTRUCTION: ICD-10-CM

## 2018-05-14 DIAGNOSIS — K59.00 CONSTIPATION, UNSPECIFIED: ICD-10-CM

## 2018-05-14 DIAGNOSIS — I25.10 ATHEROSCLEROTIC HEART DISEASE OF NATIVE CORONARY ARTERY WITHOUT ANGINA PECTORIS: ICD-10-CM

## 2018-05-14 DIAGNOSIS — I69.998 OTHER SEQUELAE FOLLOWING UNSPECIFIED CEREBROVASCULAR DISEASE: ICD-10-CM

## 2018-05-23 PROCEDURE — 85025 COMPLETE CBC W/AUTO DIFF WBC: CPT

## 2018-05-23 PROCEDURE — 74177 CT ABD & PELVIS W/CONTRAST: CPT

## 2018-05-23 PROCEDURE — 36415 COLL VENOUS BLD VENIPUNCTURE: CPT

## 2018-05-23 PROCEDURE — 82553 CREATINE MB FRACTION: CPT

## 2018-05-23 PROCEDURE — 84540 ASSAY OF URINE/UREA-N: CPT

## 2018-05-23 PROCEDURE — 93005 ELECTROCARDIOGRAM TRACING: CPT

## 2018-05-23 PROCEDURE — 87086 URINE CULTURE/COLONY COUNT: CPT

## 2018-05-23 PROCEDURE — 82550 ASSAY OF CK (CPK): CPT

## 2018-05-23 PROCEDURE — 99285 EMERGENCY DEPT VISIT HI MDM: CPT | Mod: 25

## 2018-05-23 PROCEDURE — 85610 PROTHROMBIN TIME: CPT

## 2018-05-23 PROCEDURE — 83036 HEMOGLOBIN GLYCOSYLATED A1C: CPT

## 2018-05-23 PROCEDURE — 82570 ASSAY OF URINE CREATININE: CPT

## 2018-05-23 PROCEDURE — 84484 ASSAY OF TROPONIN QUANT: CPT

## 2018-05-23 PROCEDURE — 71046 X-RAY EXAM CHEST 2 VIEWS: CPT

## 2018-05-23 PROCEDURE — 97161 PT EVAL LOW COMPLEX 20 MIN: CPT

## 2018-05-23 PROCEDURE — 96374 THER/PROPH/DIAG INJ IV PUSH: CPT

## 2018-05-23 PROCEDURE — 84443 ASSAY THYROID STIM HORMONE: CPT

## 2018-05-23 PROCEDURE — 80048 BASIC METABOLIC PNL TOTAL CA: CPT

## 2018-05-23 PROCEDURE — 83690 ASSAY OF LIPASE: CPT

## 2018-05-23 PROCEDURE — 83735 ASSAY OF MAGNESIUM: CPT

## 2018-05-23 PROCEDURE — 85027 COMPLETE CBC AUTOMATED: CPT

## 2018-05-23 PROCEDURE — 85730 THROMBOPLASTIN TIME PARTIAL: CPT

## 2018-05-23 PROCEDURE — 94640 AIRWAY INHALATION TREATMENT: CPT

## 2018-05-23 PROCEDURE — 84100 ASSAY OF PHOSPHORUS: CPT

## 2018-05-23 PROCEDURE — 80053 COMPREHEN METABOLIC PANEL: CPT

## 2018-05-23 PROCEDURE — 80061 LIPID PANEL: CPT

## 2018-05-23 PROCEDURE — 84300 ASSAY OF URINE SODIUM: CPT

## 2018-05-23 PROCEDURE — 81003 URINALYSIS AUTO W/O SCOPE: CPT

## 2018-06-11 ENCOUNTER — APPOINTMENT (OUTPATIENT)
Dept: PULMONOLOGY | Facility: CLINIC | Age: 73
End: 2018-06-11

## 2018-06-12 ENCOUNTER — APPOINTMENT (OUTPATIENT)
Dept: PULMONOLOGY | Facility: CLINIC | Age: 73
End: 2018-06-12
Payer: MEDICAID

## 2018-06-12 VITALS
TEMPERATURE: 98.5 F | OXYGEN SATURATION: 97 % | HEIGHT: 66 IN | WEIGHT: 129 LBS | DIASTOLIC BLOOD PRESSURE: 70 MMHG | BODY MASS INDEX: 20.73 KG/M2 | HEART RATE: 76 BPM | SYSTOLIC BLOOD PRESSURE: 110 MMHG

## 2018-06-12 DIAGNOSIS — Z87.19 PERSONAL HISTORY OF OTHER DISEASES OF THE DIGESTIVE SYSTEM: ICD-10-CM

## 2018-06-12 DIAGNOSIS — Z87.891 PERSONAL HISTORY OF NICOTINE DEPENDENCE: ICD-10-CM

## 2018-06-12 DIAGNOSIS — R91.1 SOLITARY PULMONARY NODULE: ICD-10-CM

## 2018-06-12 PROCEDURE — 99214 OFFICE O/P EST MOD 30 MIN: CPT

## 2018-06-12 RX ORDER — FAMOTIDINE 40 MG/1
40 TABLET, FILM COATED ORAL TWICE DAILY
Refills: 0 | Status: ACTIVE | COMMUNITY
Start: 2018-06-12

## 2018-06-12 RX ORDER — IPRATROPIUM BROMIDE 0.5 MG/2.5ML
0.02 SOLUTION RESPIRATORY (INHALATION) 4 TIMES DAILY
Refills: 0 | Status: ACTIVE | COMMUNITY
Start: 2018-06-12

## 2018-07-13 ENCOUNTER — APPOINTMENT (OUTPATIENT)
Dept: PULMONOLOGY | Facility: CLINIC | Age: 73
End: 2018-07-13

## 2018-07-17 ENCOUNTER — RX RENEWAL (OUTPATIENT)
Age: 73
End: 2018-07-17

## 2018-07-17 RX ORDER — ROFLUMILAST 500 UG/1
500 TABLET ORAL DAILY
Qty: 90 | Refills: 3 | Status: ACTIVE | COMMUNITY
Start: 2018-06-12 | End: 1900-01-01

## 2018-07-23 ENCOUNTER — APPOINTMENT (OUTPATIENT)
Dept: CT IMAGING | Facility: HOSPITAL | Age: 73
End: 2018-07-23

## 2018-08-09 ENCOUNTER — APPOINTMENT (OUTPATIENT)
Dept: CT IMAGING | Facility: HOSPITAL | Age: 73
End: 2018-08-09

## 2018-09-07 ENCOUNTER — APPOINTMENT (OUTPATIENT)
Dept: CT IMAGING | Facility: HOSPITAL | Age: 73
End: 2018-09-07
Payer: MEDICAID

## 2018-09-07 ENCOUNTER — OUTPATIENT (OUTPATIENT)
Dept: OUTPATIENT SERVICES | Facility: HOSPITAL | Age: 73
LOS: 1 days | End: 2018-09-07
Payer: MEDICAID

## 2018-09-07 DIAGNOSIS — Z95.5 PRESENCE OF CORONARY ANGIOPLASTY IMPLANT AND GRAFT: Chronic | ICD-10-CM

## 2018-09-07 PROCEDURE — 71250 CT THORAX DX C-: CPT

## 2018-09-07 PROCEDURE — 71250 CT THORAX DX C-: CPT | Mod: 26

## 2019-01-04 ENCOUNTER — RX RENEWAL (OUTPATIENT)
Age: 74
End: 2019-01-04

## 2019-01-04 RX ORDER — TIOTROPIUM BROMIDE AND OLODATEROL 3.124; 2.736 UG/1; UG/1
2.5-2.5 SPRAY, METERED RESPIRATORY (INHALATION)
Refills: 0 | Status: ACTIVE | COMMUNITY

## 2019-02-19 ENCOUNTER — EMERGENCY (EMERGENCY)
Facility: HOSPITAL | Age: 74
LOS: 1 days | Discharge: HOME | End: 2019-02-19
Attending: EMERGENCY MEDICINE | Admitting: EMERGENCY MEDICINE
Payer: MEDICAID

## 2019-02-19 VITALS
RESPIRATION RATE: 16 BRPM | DIASTOLIC BLOOD PRESSURE: 74 MMHG | OXYGEN SATURATION: 99 % | HEART RATE: 74 BPM | WEIGHT: 149.91 LBS | SYSTOLIC BLOOD PRESSURE: 120 MMHG | TEMPERATURE: 98 F

## 2019-02-19 VITALS
TEMPERATURE: 99 F | OXYGEN SATURATION: 99 % | SYSTOLIC BLOOD PRESSURE: 124 MMHG | RESPIRATION RATE: 16 BRPM | DIASTOLIC BLOOD PRESSURE: 81 MMHG | HEART RATE: 64 BPM

## 2019-02-19 DIAGNOSIS — Z95.5 PRESENCE OF CORONARY ANGIOPLASTY IMPLANT AND GRAFT: Chronic | ICD-10-CM

## 2019-02-19 PROCEDURE — 74176 CT ABD & PELVIS W/O CONTRAST: CPT

## 2019-02-19 PROCEDURE — 81003 URINALYSIS AUTO W/O SCOPE: CPT

## 2019-02-19 PROCEDURE — 85610 PROTHROMBIN TIME: CPT

## 2019-02-19 PROCEDURE — 85730 THROMBOPLASTIN TIME PARTIAL: CPT

## 2019-02-19 PROCEDURE — 74176 CT ABD & PELVIS W/O CONTRAST: CPT | Mod: 26

## 2019-02-19 PROCEDURE — 87086 URINE CULTURE/COLONY COUNT: CPT

## 2019-02-19 PROCEDURE — 99284 EMERGENCY DEPT VISIT MOD MDM: CPT | Mod: 25

## 2019-02-19 PROCEDURE — 85025 COMPLETE CBC W/AUTO DIFF WBC: CPT

## 2019-02-19 PROCEDURE — 93010 ELECTROCARDIOGRAM REPORT: CPT

## 2019-02-19 PROCEDURE — 83690 ASSAY OF LIPASE: CPT

## 2019-02-19 PROCEDURE — 93005 ELECTROCARDIOGRAM TRACING: CPT

## 2019-02-19 PROCEDURE — 80053 COMPREHEN METABOLIC PANEL: CPT

## 2019-02-19 PROCEDURE — 99284 EMERGENCY DEPT VISIT MOD MDM: CPT

## 2019-02-19 PROCEDURE — 36415 COLL VENOUS BLD VENIPUNCTURE: CPT

## 2019-02-19 NOTE — ED PROVIDER NOTE - ATTENDING CONTRIBUTION TO CARE
Patient w hx of HTN, CAD, HLD, BPH presents to ED with concern for b/l low back pain to paralumbar area.  He also noted suprapubic tenderness and difficulty urinating today.  No associated nausea, vomiting, fever, chills, hematuria or additional acute complaints or concerns are noted.  On my face to face ED eval, patient is non toxic in appearance.  HRRR, lungs clear.  Abs soft w ttp over suprapubic area.  + TTP to bilateral lumbar paraspinals.  Will check labs, US, CT abd/pel and re evaluate.  Will disposition accordingly.  Patient is aware of plan and agreeable.

## 2019-02-19 NOTE — ED ADULT NURSE NOTE - OBJECTIVE STATEMENT
73y male presented to Ed w/ c/o R flank pain x 1 week, sometimes radiating to L flank pain. Pt states episodes of N/V, last episode was 2 days ago. Denies fever, chills, no chest pain/ SOB. Admits to trouble urinating, sometimes burning, denies any blood in urine.

## 2019-02-19 NOTE — ED PROVIDER NOTE - PLAN OF CARE
Pt with c/o paraspinal muscle. Initial thought was probable Nephrolithiasis.   However physical exam confirmed suspicion.   CT scan confirmed NO STONES. However showing some possible bladder wall thickening. UA was negative. Given suprapubic tenderness. Son to follow culture data and follow up with PMD tomorrow.   - Pt educated to take tylenol prn for pain, also use of Icy Hot patch for back pain.  -Son to follow culture data.   - to F/up with PMD tomorrow.

## 2019-02-19 NOTE — ED PROVIDER NOTE - CARE PLAN
Principal Discharge DX:	Paraspinal muscle spasm  Assessment and plan of treatment:	Pt with c/o paraspinal muscle. Initial thought was probable Nephrolithiasis.   However physical exam confirmed suspicion.   CT scan confirmed NO STONES. However showing some possible bladder wall thickening. UA was negative. Given suprapubic tenderness. Son to follow culture data and follow up with PMD tomorrow.   - Pt educated to take tylenol prn for pain, also use of Icy Hot patch for back pain.  -Son to follow culture data.   - to F/up with PMD tomorrow.

## 2019-02-19 NOTE — ED PROVIDER NOTE - PHYSICAL EXAMINATION
PHYSICAL EXAM:  Constitutional: WDWN resting comfortably in bed; NAD  Head: NC/AT  Eyes: PERRL, EOMI, anicteric sclera  ENT: no nasal discharge; uvula midline, no oropharyngeal erythema or exudates; MMM  Neck: supple; no JVD or thyromegaly  Respiratory: CTA B/L; no W/R/R, no retractions  Cardiac: +S1/S2; RRR; no M/R/G; PMI non-displaced  Gastrointestinal: abdomen soft, NT/ND; no rebound or guarding; +BSx4, NO CVA tenderness.   Genitourinary: normal external genitalia, suprapubic tenderness.   Back: spine midline, no bony tenderness or step-offs; no CVAT B/L  Extremities: WWP, no clubbing or cyanosis; no peripheral edema  Musculoskeletal: NROM x4; no joint swelling, tenderness paraspinally b/l at the level of L4- L5,straight Leg elevation/flexion at hip +ve pain paraspinally.   Vascular: 2+ radial, femoral, DP/PT pulses B/L  Neurologic: CNII-XII grossly intact; no focal deficits

## 2019-02-19 NOTE — ED PROVIDER NOTE - NSFOLLOWUPINSTRUCTIONS_ED_ALL_ED_FT
Please follow up with your PMD tomorrow for Suprapubic pain likely cystitis.   For your back pain you can take tylenol over the counter and Icy Hot pain patches.

## 2019-02-19 NOTE — ED ADULT TRIAGE NOTE - CHIEF COMPLAINT QUOTE
pt complaining of right flank pain N/V x 3 days with pain and burning on urination denies diarrhea constipation admits to productive cough yellow sputum

## 2019-02-19 NOTE — ED ADULT NURSE NOTE - NSIMPLEMENTINTERV_GEN_ALL_ED
Implemented All Fall Risk Interventions:  [place] to call system. Call bell, personal items and telephone within reach. Instruct patient to call for assistance. Room bathroom lighting operational. Non-slip footwear when patient is off stretcher. Physically safe environment: no spills, clutter or unnecessary equipment. Stretcher in lowest position, wheels locked, appropriate side rails in place. Provide visual cue, wrist band, yellow gown, etc. Monitor gait and stability. Monitor for mental status changes and reorient to person, place, and time. Review medications for side effects contributing to fall risk. Reinforce activity limits and safety measures with patient and family.

## 2019-02-19 NOTE — ED PROVIDER NOTE - OBJECTIVE STATEMENT
72 yo M with h/o HTN, CVA with residual Lt sided vision loss, BPH, CAD s/p CABG+Stent, COPD, HLD presenting with c/o back pain.   Pt describing dull pain b/l paraspinally on palpation. Also with c/o suprapubic tenderness with sensation of inc 74 yo M with h/o HTN, CVA with residual Lt sided vision loss, BPH, CAD s/p CABG+Stent, COPD, HLD presenting with c/o back pain.   Pt describing dull pain b/l paraspinally on palpation. Also with c/o suprapubic tenderness with sensation of a full bladder and some dysuria. Rest of abdomen benign.   Pt denying fever or chills.   Denies headaches, CP, palpitations, cough, SOB, dizziness. 72 yo M with h/o HTN, CVA with residual Lt sided vision loss, BPH, CAD s/p CABG+Stent, COPD, HLD presenting with c/o back pain.   Pt describing dull pain b/l paraspinally on palpation. Also with c/o suprapubic tenderness with sensation of a full bladder. Rest of abdomen benign.   Pt denying fever or chills.   Denies headaches, CP, palpitations, cough, SOB, dizziness.

## 2019-02-19 NOTE — ED PROVIDER NOTE - PSH
Acquired absence of genital organ  S/P orchiectomy  H/O heart artery stent    Other postprocedural status  Cath @ [facility] 5/19/14: 80% pLAD, 70% oD1, widely patent stents in mLCx, RPDA & RPLS. Successful PTCA KARAN pLAD & PTCA ostial D1.  Status post aorto-coronary artery bypass graft  LIMA - LAD midCAB 2/2014 @ [facility]

## 2019-03-28 ENCOUNTER — FORM ENCOUNTER (OUTPATIENT)
Age: 74
End: 2019-03-28

## 2019-03-29 ENCOUNTER — OUTPATIENT (OUTPATIENT)
Dept: OUTPATIENT SERVICES | Facility: HOSPITAL | Age: 74
LOS: 1 days | End: 2019-03-29
Payer: MEDICAID

## 2019-03-29 ENCOUNTER — APPOINTMENT (OUTPATIENT)
Dept: CT IMAGING | Facility: HOSPITAL | Age: 74
End: 2019-03-29
Payer: MEDICAID

## 2019-03-29 DIAGNOSIS — Z95.5 PRESENCE OF CORONARY ANGIOPLASTY IMPLANT AND GRAFT: Chronic | ICD-10-CM

## 2019-03-29 PROCEDURE — 71250 CT THORAX DX C-: CPT

## 2019-03-29 PROCEDURE — 71250 CT THORAX DX C-: CPT | Mod: 26

## 2019-04-25 ENCOUNTER — RX RENEWAL (OUTPATIENT)
Age: 74
End: 2019-04-25

## 2019-04-25 RX ORDER — ALBUTEROL SULFATE 2.5 MG/3ML
(2.5 MG/3ML) SOLUTION RESPIRATORY (INHALATION)
Qty: 4 | Refills: 3 | Status: ACTIVE | COMMUNITY
Start: 2018-06-12 | End: 1900-01-01

## 2019-04-25 RX ORDER — TIOTROPIUM BROMIDE AND OLODATEROL 3.124; 2.736 UG/1; UG/1
2.5-2.5 SPRAY, METERED RESPIRATORY (INHALATION) DAILY
Qty: 3 | Refills: 3 | Status: ACTIVE | COMMUNITY
Start: 2018-06-12 | End: 1900-01-01

## 2019-04-25 RX ORDER — BUDESONIDE 0.5 MG/2ML
0.5 INHALANT ORAL TWICE DAILY
Qty: 6 | Refills: 3 | Status: ACTIVE | COMMUNITY
Start: 2018-06-12 | End: 1900-01-01

## 2019-04-25 RX ORDER — MONTELUKAST 10 MG/1
10 TABLET, FILM COATED ORAL
Qty: 90 | Refills: 3 | Status: ACTIVE | COMMUNITY
Start: 2018-06-12 | End: 1900-01-01

## 2019-05-29 ENCOUNTER — APPOINTMENT (OUTPATIENT)
Dept: HEART AND VASCULAR | Facility: CLINIC | Age: 74
End: 2019-05-29

## 2019-06-21 ENCOUNTER — APPOINTMENT (OUTPATIENT)
Dept: PULMONOLOGY | Facility: CLINIC | Age: 74
End: 2019-06-21

## 2019-08-07 ENCOUNTER — APPOINTMENT (OUTPATIENT)
Dept: HEART AND VASCULAR | Facility: CLINIC | Age: 74
End: 2019-08-07

## 2019-09-07 NOTE — ED PROVIDER NOTE - ENMT NEGATIVE STATEMENT, MLM
Ears: no ear pain and no hearing problems.Nose: no nasal congestion and no nasal drainage.Mouth/Throat: no dysphagia, no hoarseness and no throat pain.Neck: no lumps, no pain, no stiffness and no swollen glands.
"I walked here voluntarily. I want to get my medications, I could not find them".

## 2019-10-09 ENCOUNTER — APPOINTMENT (OUTPATIENT)
Dept: PULMONOLOGY | Facility: CLINIC | Age: 74
End: 2019-10-09
Payer: MEDICAID

## 2019-10-09 VITALS
BODY MASS INDEX: 20.89 KG/M2 | HEART RATE: 81 BPM | DIASTOLIC BLOOD PRESSURE: 60 MMHG | SYSTOLIC BLOOD PRESSURE: 108 MMHG | TEMPERATURE: 97.4 F | RESPIRATION RATE: 12 BRPM | HEIGHT: 66 IN | WEIGHT: 130 LBS

## 2019-10-09 DIAGNOSIS — R06.83 SNORING: ICD-10-CM

## 2019-10-09 DIAGNOSIS — J00 ACUTE NASOPHARYNGITIS [COMMON COLD]: ICD-10-CM

## 2019-10-09 DIAGNOSIS — J44.9 CHRONIC OBSTRUCTIVE PULMONARY DISEASE, UNSPECIFIED: ICD-10-CM

## 2019-10-09 DIAGNOSIS — R91.8 OTHER NONSPECIFIC ABNORMAL FINDING OF LUNG FIELD: ICD-10-CM

## 2019-10-09 DIAGNOSIS — J20.8 ACUTE BRONCHITIS DUE TO OTHER SPECIFIED ORGANISMS: ICD-10-CM

## 2019-10-09 PROCEDURE — 99214 OFFICE O/P EST MOD 30 MIN: CPT

## 2019-10-09 NOTE — END OF VISIT
MICHELE SIGNED FOR [facility]INT.  RECORDS REQUESTED.   [>50% of Time Spent on Counseling and Coordination of Care for  ___] : Greater than 50% of the encounter time was spent on counseling and coordination of care for [unfilled] [Time Spent: ___ minutes] : I have spent [unfilled] minutes of face to face time with the patient

## 2019-10-09 NOTE — REASON FOR VISIT
[Follow-Up] : a follow-up visit [Family Member] : family member [COPD] : COPD [FreeTextEntry2] : pulmonary nodules / chronic bronchitis

## 2019-10-09 NOTE — PHYSICAL EXAM
[General Appearance - Well Developed] : well developed [Normal Appearance] : normal appearance [Well Groomed] : well groomed [General Appearance - Well Nourished] : well nourished [No Deformities] : no deformities [Normal Conjunctiva] : the conjunctiva exhibited no abnormalities [General Appearance - In No Acute Distress] : no acute distress [Normal Oropharynx] : normal oropharynx [Eyelids - No Xanthelasma] : the eyelids demonstrated no xanthelasmas [Neck Appearance] : the appearance of the neck was normal [Neck Cervical Mass (___cm)] : no neck mass was observed [Jugular Venous Distention Increased] : there was no jugular-venous distention [Thyroid Diffuse Enlargement] : the thyroid was not enlarged [Thyroid Nodule] : there were no palpable thyroid nodules [Heart Rate And Rhythm] : heart rate and rhythm were normal [Heart Sounds] : normal S1 and S2 [Murmurs] : no murmurs present [Respiration, Rhythm And Depth] : normal respiratory rhythm and effort [Auscultation Breath Sounds / Voice Sounds] : lungs were clear to auscultation bilaterally [Exaggerated Use Of Accessory Muscles For Inspiration] : no accessory muscle use [Abdomen Soft] : soft [Abdomen Tenderness] : non-tender [Abdomen Mass (___ Cm)] : no abdominal mass palpated [Abnormal Walk] : normal gait [Gait - Sufficient For Exercise Testing] : the gait was sufficient for exercise testing [Nail Clubbing] : no clubbing of the fingernails [Cyanosis, Localized] : no localized cyanosis [Petechial Hemorrhages (___cm)] : no petechial hemorrhages [Skin Color & Pigmentation] : normal skin color and pigmentation [No Venous Stasis] : no venous stasis [] : no rash [No Skin Ulcers] : no skin ulcer [Skin Lesions] : no skin lesions [No Xanthoma] : no  xanthoma was observed [Deep Tendon Reflexes (DTR)] : deep tendon reflexes were 2+ and symmetric [Sensation] : the sensory exam was normal to light touch and pinprick [No Focal Deficits] : no focal deficits [Erythema] : erythema of the pharynx [Low Lying Soft Palate] : no low lying soft palate [Elongated Uvula] : no elongated uvula [Retrognathia] : no retrognathia [Enlarged Base of the Tongue] : no enlargement of the base of the tongue [Micrognathia] : no micrognathia

## 2019-10-09 NOTE — HISTORY OF PRESENT ILLNESS
[Feelings Of Weakness On Exertion] : worsened exercise intolerance [Difficulty Breathing During Exertion] : worsened dyspnea on exertion [Wheezing] : stable wheezing [Cough] : worsened coughing [Regional Soft Tissue Swelling Both Lower Extremities] : denies lower extremity edema [Fever] : denies fever [Chest Pain Or Discomfort] : denies chest pain [Wt Loss ___ Lbs] : recent [unfilled] ~Upound(s) weight loss [5  -  Severe] : 5, severe [More Frequent Use Needed Recently] : Patient reports recent increase in frequency of [Date: ___] : was performed [unfilled] [___ Times a Day] : [unfilled] time(s) a day [Former] : is a former smoker [___ Pack Year History] : [unfilled] pack year history [Snoring] : snoring [Frequent Nocturnal Awakening] : frequent nocturnal awakening [Unintentional Sleep While Inactive] : unintentional sleep while inactive [Awakes Unrefreshed] : awakening unrefreshed [Awakening With Dry Mouth] : awakening with dry mouth [Wt Gain ___ Lbs] : no recent weight gain [Oxygen] : the patient uses no supplemental oxygen [Witnessed Apneas] : no witnessed sleep apnea [Daytime Somnolence] : no daytime somnolence [Recent  Weight Gain] : no recent weight gain [Awakes with Headache] : no headache upon awakening [Unintentional Sleep while Active] : no unintentional sleep while active [FreeTextEntry1] : he was doing OK until he had a cold.  he is now weak, more sob, and coughing. He is wheezing.  He has a fever.  he has sputum in the morning and color is white.No d=sore throat and runny nose.

## 2019-10-09 NOTE — ASSESSMENT
[FreeTextEntry1] : Acute rhinitis\par RVP.  Started the patient on flonase one puff bid\par \par Acute bronchitis\par \par No clinical evidence of pneumonia.  Started the patient on Cefdinir\par \par COPD\par \par he is clinically stable and no indication for systemic steroids.  He is on Stiolto and as needed albuterol\par \par Pulmonary nodules\par \par Follow on CT scan in 4/20\par

## 2019-10-10 LAB
RAPID RVP RESULT: DETECTED
RV+EV RNA SPEC QL NAA+PROBE: DETECTED

## 2019-10-24 ENCOUNTER — EMERGENCY (EMERGENCY)
Facility: HOSPITAL | Age: 74
LOS: 1 days | Discharge: HOME | End: 2019-10-24
Admitting: EMERGENCY MEDICINE
Payer: MEDICAID

## 2019-10-24 VITALS
SYSTOLIC BLOOD PRESSURE: 140 MMHG | TEMPERATURE: 98 F | DIASTOLIC BLOOD PRESSURE: 86 MMHG | RESPIRATION RATE: 16 BRPM | HEART RATE: 66 BPM | OXYGEN SATURATION: 99 %

## 2019-10-24 VITALS
SYSTOLIC BLOOD PRESSURE: 112 MMHG | HEART RATE: 95 BPM | HEIGHT: 65 IN | RESPIRATION RATE: 16 BRPM | TEMPERATURE: 98 F | DIASTOLIC BLOOD PRESSURE: 75 MMHG | WEIGHT: 138.01 LBS | OXYGEN SATURATION: 97 %

## 2019-10-24 DIAGNOSIS — M25.572 PAIN IN LEFT ANKLE AND JOINTS OF LEFT FOOT: ICD-10-CM

## 2019-10-24 DIAGNOSIS — Z95.5 PRESENCE OF CORONARY ANGIOPLASTY IMPLANT AND GRAFT: Chronic | ICD-10-CM

## 2019-10-24 DIAGNOSIS — M79.662 PAIN IN LEFT LOWER LEG: ICD-10-CM

## 2019-10-24 PROCEDURE — 73562 X-RAY EXAM OF KNEE 3: CPT | Mod: 26,LT

## 2019-10-24 PROCEDURE — 93971 EXTREMITY STUDY: CPT

## 2019-10-24 PROCEDURE — 73610 X-RAY EXAM OF ANKLE: CPT

## 2019-10-24 PROCEDURE — 73610 X-RAY EXAM OF ANKLE: CPT | Mod: 26,LT

## 2019-10-24 PROCEDURE — 73590 X-RAY EXAM OF LOWER LEG: CPT | Mod: 26,LT

## 2019-10-24 PROCEDURE — 73620 X-RAY EXAM OF FOOT: CPT | Mod: 26,LT

## 2019-10-24 PROCEDURE — 73590 X-RAY EXAM OF LOWER LEG: CPT

## 2019-10-24 PROCEDURE — 99284 EMERGENCY DEPT VISIT MOD MDM: CPT

## 2019-10-24 PROCEDURE — 93971 EXTREMITY STUDY: CPT | Mod: 26,LT

## 2019-10-24 PROCEDURE — 73562 X-RAY EXAM OF KNEE 3: CPT

## 2019-10-24 PROCEDURE — 99284 EMERGENCY DEPT VISIT MOD MDM: CPT | Mod: 25

## 2019-10-24 PROCEDURE — 73620 X-RAY EXAM OF FOOT: CPT

## 2019-10-24 NOTE — ED PROVIDER NOTE - PHYSICAL EXAMINATION
Vitals reviewed  Gen: well appearing, nad, speaking in full sentences  Skin: wwp   HEENT: ncat, eomi, mmm  CV: rrr, no audible m/r/g  Resp: ctab, no w/r/r  Abd: soft/nt  Ext: Distal sensations intact. LLE DT pulse 2+, unable to palpate PT pulse due to swelling. LLE with medial malleolar tenderness and positive calf tenderness but with soft compartments. No knee or joint pain and with full ROM. 5/5 strength.  Neuro: alert/oriented, no focal deficits, steady gait Vitals reviewed  Gen: well appearing, nad, speaking in full sentences  Skin: wwp, no ecchymosis/erythema   HEENT: ncat, eomi, mmm  CV: rrr, no audible m/r/g  Resp: ctab, no w/r/r  Ext: Distal sensations intact. LLE DP pulse 2+, unable to palpate PT pulse due to swelling. LLE with medial malleolar tenderness and positive calf tenderness, compartments soft. no knee ttp, 5/5 strength, FROM all joints, no foot drop  Neuro: alert/oriented, no focal deficits, steady gait Vitals reviewed  Gen: well appearing, nad, speaking in full sentences  Skin: wwp, no ecchymosis/erythema   HEENT: ncat, eomi, mmm  CV: rrr, no audible m/r/g  Resp: ctab, no w/r/r  LLE: swelling ankle and calf w/ medial malleolar ttp, no deformity, +calf ttp, compartments soft, 5/5 strength and FROM all joints, no foot drop, distal sensation intact, DP pulse 2+, unable to palpate PT pulse 2/2 swelling  RLE: no joint ttp/swelling, NV intact  Neuro: alert/oriented, no focal deficits, steady gait Vitals reviewed  Gen: well appearing, nad, speaking in full sentences  Skin: wwp, no ecchymosis/erythema   HEENT: ncat, eomi, mmm  CV: rrr, no audible m/r/g  Resp: ctab, no w/r/r  LLE: swelling ankle and calf w/ medial malleolar ttp- no deformity, +calf ttp, no ttp knee, compartments soft, 5/5 strength and FROM all joints, no foot drop, distal sensation intact, DP pulse 2+, unable to palpate PT pulse 2/2 swelling  RLE: no joint ttp/swelling, NV intact  Neuro: alert/oriented, no focal deficits, steady gait w/ assistance

## 2019-10-24 NOTE — ED ADULT NURSE NOTE - OBJECTIVE STATEMENT
75 y/o male w/ hx of CAD w/ CABG. HLD, BPH, and HTN, presents to the ED c/o left ankle pain s/p trip 2 days ago. Pt reportedly was walking with his son when he began experiencing left ankle pain. Pt reports swelling to left leg as well. Denies any other sx.

## 2019-10-24 NOTE — ED PROVIDER NOTE - PATIENT PORTAL LINK FT
You can access the FollowMyHealth Patient Portal offered by [facility] by registering at the following website: [email or web address]. By joining [email or web address]’s FollowMyHealth portal, you will also be able to view your health information using other applications (apps) compatible with our system.

## 2019-10-24 NOTE — ED PROVIDER NOTE - OBJECTIVE STATEMENT
75 y/o M, translated by son, with PMHx of CAD w/ CABG x4 (currenly on Plavix and Aspirin), HTN, HLD, BPH, presents to the ED with L ankle pain s/p possible trip 2 days ago. As per son, he was walking with the patient in the park when the patient grabbed onto his shoulders and was unable to ambulate due to pain in his L ankle. Since then, has been taking ibuprofen and tylenol with minimal relief, able to ambulate with assistance, but with new swelling noticed today. Ankle and calf pain persists. Denies LOC, dizziness, chest pain, SOB, or any other acute complaints. 75 y/o M, translated by son, with PMHx of CAD w/ stent x4 (currenly on Plavix and Aspirin), COPD, HTN, HLD, BPH, presents to the ED with L ankle pain s/p injury 2 days ago. As per son, pt was walking in park when the patient grabbed onto sons shoulders after hurting ankle and was unable to ambulate due to pain. Pt c/o increased ankle and calf pain since injury, family noticed significant swelling to ankle/calf today.  He has been taking ibuprofen and tylenol with minimal relief, able to ambulate with assistance.  Denies fever, LOC, dizziness, chest pain, SOB, or any other acute complaints. 73 y/o M, translated by son, with PMHx of CAD w/ stent x4 (on Plavix and Aspirin), COPD, HTN, HLD, BPH, presents to the ED with L ankle pain s/p injury 2 days ago. As per son, pt was walking in park when the patient grabbed onto sons shoulders after hurting ankle and was unable to ambulate due to pain. Pt c/o increased ankle and calf pain since injury, family noticed significant swelling to ankle/calf today.  He has been taking ibuprofen and tylenol with minimal relief, able to ambulate with assistance.  Denies fever, LOC, dizziness, chest pain, SOB, limited ROM LLE, numbness, tingling, skin changes.

## 2019-10-24 NOTE — ED PROVIDER NOTE - NSFOLLOWUPINSTRUCTIONS_ED_ALL_ED_FT
Apply ice, elevate your left leg as much as possible and wear ace bandage for support.  Take tylenol or motrin for pain and follow up with primary doctor in one week.  Return to ED for fever, changes to skin color, increased swelling/tense feeling when touching leg, numbness, weakness, difficulty breathing, chest pain, vomiting     Strain    A strain is a stretch or tear in one of the muscles in your body. This is caused by an injury to the area such as a twisting mechanism. Symptoms include pain, swelling, or bruising. Rest that area over the next several days and slowly resume activity when tolerated. Ice can help with swelling and pain.     SEEK IMMEDIATE MEDICAL CARE IF YOU HAVE ANY OF THE FOLLOWING SYMPTOMS: worsening pain, inability to move that body part, numbness or tingling.

## 2019-10-24 NOTE — ED PROVIDER NOTE - DIAGNOSTIC INTERPRETATION
ER PA: Adenike Shay  L foot: no acute fracture; no soft tissue swelling noted; normal bony alignment.  L ankle: no acute fracture; no soft tissue swelling noted; normal bony alignment, no widening of mortise  L tib/fib: no acute fracture; no soft tissue swelling noted; normal bony alignment  L knee: no acute fracture; no soft tissue swelling noted; normal bony alignment.

## 2019-10-24 NOTE — ED PROVIDER NOTE - PSH
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 Successful PTCA KARAN pLAD & PTCA ostial D1.  Status post aorto-coronary artery bypass graft  LIMA - LAD midCAB 2/2014 @ [facility]

## 2019-10-24 NOTE — ED PROVIDER NOTE - CLINICAL SUMMARY MEDICAL DECISION MAKING FREE TEXT BOX
73 y/o M, translated by son, with PMHx of CAD w/ stent x4 (on Plavix and Aspirin), COPD, HTN, HLD, BPH, presents to the ED with L ankle pain s/p injury 2 days ago. L foot/ankle/tib/fib/knee xrays show no acute fracture/dislocation.  LLE sonogram shows calcification and minimal fluid L gastrocnemius consistent with recent injury/inflammation, there is no evidence of DVT.  pt and son educated on findings of sprain and RICE/OTC analgesia.  Given ace wrap for support and pt will follow up with pmd on 10/28 as scheduled.  discussed strict return parameters.

## 2019-10-24 NOTE — ED ADULT NURSE NOTE - PSH
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 5/19/14: 80% pLAD, 70% oD1, widely patent stents in mLCx, RPDA & RPLS. Successful PTCA KARAN pLAD & PTCA ostial D1.  Status post aorto-coronary artery bypass graft  LIMA - LAD midCAB 2/2014 @ [facility]

## 2019-10-28 ENCOUNTER — APPOINTMENT (OUTPATIENT)
Dept: PULMONOLOGY | Facility: CLINIC | Age: 74
End: 2019-10-28

## 2020-02-10 DIAGNOSIS — I71.9 AORTIC ANEURYSM OF UNSPECIFIED SITE, W/OUT RUPTURE: ICD-10-CM

## 2020-02-10 DIAGNOSIS — I25.810 ATHEROSCLEROSIS OF CORONARY ARTERY BYPASS GRAFT(S) W/OUT ANGINA PECTORIS: ICD-10-CM

## 2020-02-10 DIAGNOSIS — I25.10 ATHEROSCLEROTIC HEART DISEASE OF NATIVE CORONARY ARTERY W/OUT ANGINA PECTORIS: ICD-10-CM

## 2020-02-18 ENCOUNTER — NON-APPOINTMENT (OUTPATIENT)
Age: 75
End: 2020-02-18

## 2020-02-18 ENCOUNTER — APPOINTMENT (OUTPATIENT)
Dept: HEART AND VASCULAR | Facility: CLINIC | Age: 75
End: 2020-02-18
Payer: MEDICARE

## 2020-02-18 VITALS
OXYGEN SATURATION: 99 % | BODY MASS INDEX: 24.66 KG/M2 | SYSTOLIC BLOOD PRESSURE: 120 MMHG | WEIGHT: 134 LBS | HEART RATE: 71 BPM | DIASTOLIC BLOOD PRESSURE: 77 MMHG | HEIGHT: 62 IN

## 2020-02-18 DIAGNOSIS — I10 ESSENTIAL (PRIMARY) HYPERTENSION: ICD-10-CM

## 2020-02-18 DIAGNOSIS — E78.5 HYPERLIPIDEMIA, UNSPECIFIED: ICD-10-CM

## 2020-02-18 DIAGNOSIS — I25.10 ATHEROSCLEROTIC HEART DISEASE OF NATIVE CORONARY ARTERY W/OUT ANGINA PECTORIS: ICD-10-CM

## 2020-02-18 DIAGNOSIS — R00.2 PALPITATIONS: ICD-10-CM

## 2020-02-18 PROBLEM — I71.9 AORTIC ANEURYSM: Status: ACTIVE | Noted: 2020-02-18

## 2020-02-18 PROCEDURE — 99214 OFFICE O/P EST MOD 30 MIN: CPT | Mod: 25

## 2020-02-18 PROCEDURE — 93000 ELECTROCARDIOGRAM COMPLETE: CPT

## 2020-02-18 RX ORDER — HYDROCHLOROTHIAZIDE 12.5 MG/1
12.5 CAPSULE ORAL DAILY
Qty: 90 | Refills: 3 | Status: ACTIVE | COMMUNITY
Start: 2020-02-18 | End: 1900-01-01

## 2020-02-18 RX ORDER — METOPROLOL SUCCINATE 100 MG/1
100 TABLET, EXTENDED RELEASE ORAL DAILY
Qty: 90 | Refills: 3 | Status: ACTIVE | COMMUNITY
Start: 2020-02-18 | End: 1900-01-01

## 2020-02-18 RX ORDER — LOSARTAN POTASSIUM 100 MG/1
100 TABLET, FILM COATED ORAL DAILY
Qty: 3 | Refills: 3 | Status: ACTIVE | COMMUNITY
Start: 2020-02-18 | End: 1900-01-01

## 2020-02-18 RX ORDER — FUROSEMIDE 20 MG/1
20 TABLET ORAL DAILY
Qty: 90 | Refills: 3 | Status: ACTIVE | COMMUNITY
Start: 2020-02-18 | End: 1900-01-01

## 2020-02-18 RX ORDER — ATORVASTATIN CALCIUM 80 MG/1
80 TABLET, FILM COATED ORAL
Refills: 0 | Status: ACTIVE | COMMUNITY
Start: 2018-06-12

## 2020-02-18 RX ORDER — RANOLAZINE 500 MG/1
500 TABLET, EXTENDED RELEASE ORAL
Qty: 180 | Refills: 3 | Status: ACTIVE | COMMUNITY
Start: 2018-06-12 | End: 1900-01-01

## 2020-02-18 NOTE — HISTORY OF PRESENT ILLNESS
[FreeTextEntry1] : Ed Vazquez presents for a follow up and management of HTN, HLD, GERD, CAD s/p multiple PCIs, MIDCAB (LIMA-LAD- 2/14). He has a pmhx of smoking and is being seen by Dr. Mera for COPD/chronic bronchitis and pulmonary nodules. Since his last visit (2017), patient has complaints of constant chest heaviness. When he walks about 2 blocks he feels fatigued, ROJAS up to 0.5 flight of stairs and needs to rest. His ETT tolerance has decreased since last February when his wife past away from walking 3-4 blocks. He also admits to some positional dizziness and some palpitations in am. Denies any other cardiovascular symptoms. His son accompanied him during this appointment states he is not drinking water as much but eating adequate amounts.

## 2020-02-18 NOTE — ASSESSMENT
[FreeTextEntry1] : CAD: s/p PCI to mLAD, LCx, PDA, RPL and D1. CCS 1\par - Continue ASA 81mg daily\par - Continue Plavix 75mg daily\par - Continue Atorvastatin 80mg daily\par - Continue Imdur 30mg daily at bedtime.\par - Continue Toprol XL 100mg daily\par - Continue Ranolazine 500mg q12HRS\par - Continue aggressive medical management\par - Obtain Nuclear stress test\par \par HTN: BP at ACC/AHA 2017 guideline target\par - Continue Losrtan 100mg daily\par - Continue HCTZ 12.5mg daily\par - Continue Imdur 30mg  at bedtime\par - Continue Lasix 20mg daily\par  \par Hyperlipidemia: Lipids at goal\par - Continue Atorvastatin 80mg daily\par - Discussed therapeutic lifestyle changes to promote improved lipid metabolism\par  \par Aortic Aneurysm: persistent mild aneurysmal dilatation of the aortic root (4 cm) on chest CT (9/18).\par - Obtain echo for size and follow echo q6 months\par - Will refer to Dr. Mayer and enroll to aortic aneurysm clinic pending results (>4.5cm)\par \par

## 2020-02-18 NOTE — REASON FOR VISIT
[FreeTextEntry1] : Diagnostic Test:\par ----------------------------------\par ECG:\par 2/10/2020: Sinus Rhythm\par 2/19/19: Sinus Rhythm, LVH\par ----------------------------------\par Echo:\par 9/15/15: EF 70%, LVH with no WMA\par -----------------------------------\par Stress:\par 11/17/17: EF 68%. normal nuclear stress \par -----------------------------------\par Cath:\par 9/15/15: Mild LM dz, mLAD stent widely patent (+ bridging), D1 80% (FFR 0.78), LCx patent stent, RCA patent stents in PDA and RPL. S/P PCI to D1.\par 5/19/14: pLAD 80%, D1 70%, mLCx, rPDA, RPL widely patent stents, PCI to pLAD and PTCA to D1\par -----------------------------------\par

## 2020-03-15 NOTE — ED PROVIDER NOTE - CROS ED CONS ALL NEG
negative...
weight-bearing as tolerated/LLE due to c/o pain in left hip upon weight bearing/standing and ambulation

## 2020-04-02 ENCOUNTER — INPATIENT (INPATIENT)
Facility: HOSPITAL | Age: 75
LOS: 12 days | DRG: 870 | End: 2020-04-15
Attending: INTERNAL MEDICINE | Admitting: INTERNAL MEDICINE
Payer: MEDICARE

## 2020-04-02 VITALS
WEIGHT: 134.92 LBS | SYSTOLIC BLOOD PRESSURE: 136 MMHG | RESPIRATION RATE: 28 BRPM | OXYGEN SATURATION: 81 % | HEIGHT: 63 IN | DIASTOLIC BLOOD PRESSURE: 80 MMHG | TEMPERATURE: 99 F | HEART RATE: 113 BPM

## 2020-04-02 DIAGNOSIS — B97.21 SARS-ASSOCIATED CORONAVIRUS AS THE CAUSE OF DISEASES CLASSIFIED ELSEWHERE: ICD-10-CM

## 2020-04-02 DIAGNOSIS — J96.01 ACUTE RESPIRATORY FAILURE WITH HYPOXIA: ICD-10-CM

## 2020-04-02 DIAGNOSIS — Z95.5 PRESENCE OF CORONARY ANGIOPLASTY IMPLANT AND GRAFT: Chronic | ICD-10-CM

## 2020-04-02 DIAGNOSIS — Z29.9 ENCOUNTER FOR PROPHYLACTIC MEASURES, UNSPECIFIED: ICD-10-CM

## 2020-04-02 DIAGNOSIS — I25.10 ATHEROSCLEROTIC HEART DISEASE OF NATIVE CORONARY ARTERY WITHOUT ANGINA PECTORIS: ICD-10-CM

## 2020-04-02 DIAGNOSIS — J44.9 CHRONIC OBSTRUCTIVE PULMONARY DISEASE, UNSPECIFIED: ICD-10-CM

## 2020-04-02 DIAGNOSIS — I10 ESSENTIAL (PRIMARY) HYPERTENSION: ICD-10-CM

## 2020-04-02 LAB
ALBUMIN SERPL ELPH-MCNC: 3.6 G/DL — SIGNIFICANT CHANGE UP (ref 3.3–5)
ALP SERPL-CCNC: 69 U/L — SIGNIFICANT CHANGE UP (ref 40–120)
ALT FLD-CCNC: 47 U/L — HIGH (ref 10–45)
ANION GAP SERPL CALC-SCNC: 21 MMOL/L — HIGH (ref 5–17)
AST SERPL-CCNC: 62 U/L — HIGH (ref 10–40)
BASE EXCESS BLDV CALC-SCNC: 1.3 MMOL/L — SIGNIFICANT CHANGE UP
BASOPHILS # BLD AUTO: 0 K/UL — SIGNIFICANT CHANGE UP (ref 0–0.2)
BASOPHILS NFR BLD AUTO: 0 % — SIGNIFICANT CHANGE UP (ref 0–2)
BILIRUB SERPL-MCNC: 0.7 MG/DL — SIGNIFICANT CHANGE UP (ref 0.2–1.2)
BUN SERPL-MCNC: 25 MG/DL — HIGH (ref 7–23)
CA-I SERPL-SCNC: 1.11 MMOL/L — LOW (ref 1.12–1.3)
CALCIUM SERPL-MCNC: 9.5 MG/DL — SIGNIFICANT CHANGE UP (ref 8.4–10.5)
CHLORIDE SERPL-SCNC: 86 MMOL/L — LOW (ref 96–108)
CK MB CFR SERPL CALC: 2.1 NG/ML — SIGNIFICANT CHANGE UP (ref 0–6.7)
CK SERPL-CCNC: 112 U/L — SIGNIFICANT CHANGE UP (ref 30–200)
CO2 SERPL-SCNC: 22 MMOL/L — SIGNIFICANT CHANGE UP (ref 22–31)
CREAT SERPL-MCNC: 1.22 MG/DL — SIGNIFICANT CHANGE UP (ref 0.5–1.3)
CRP SERPL-MCNC: 32.07 MG/DL — HIGH (ref 0–0.4)
D DIMER BLD IA.RAPID-MCNC: 542 NG/ML DDU — HIGH
EOSINOPHIL # BLD AUTO: 0 K/UL — SIGNIFICANT CHANGE UP (ref 0–0.5)
EOSINOPHIL NFR BLD AUTO: 0 % — SIGNIFICANT CHANGE UP (ref 0–6)
FERRITIN SERPL-MCNC: 1214 NG/ML — HIGH (ref 30–400)
GAS PNL BLDV: 126 MMOL/L — LOW (ref 138–146)
GAS PNL BLDV: SIGNIFICANT CHANGE UP
GIANT PLATELETS BLD QL SMEAR: PRESENT — SIGNIFICANT CHANGE UP
GLUCOSE SERPL-MCNC: 157 MG/DL — HIGH (ref 70–99)
HCO3 BLDV-SCNC: 24 MMOL/L — SIGNIFICANT CHANGE UP (ref 20–27)
HCT VFR BLD CALC: 36.4 % — LOW (ref 39–50)
HGB BLD-MCNC: 12.8 G/DL — LOW (ref 13–17)
LACTATE SERPL-SCNC: 3.4 MMOL/L — HIGH (ref 0.5–2)
LDH SERPL L TO P-CCNC: 433 U/L — HIGH (ref 50–242)
LYMPHOCYTES # BLD AUTO: 0.61 K/UL — LOW (ref 1–3.3)
LYMPHOCYTES # BLD AUTO: 4.4 % — LOW (ref 13–44)
MANUAL SMEAR VERIFICATION: SIGNIFICANT CHANGE UP
MCHC RBC-ENTMCNC: 30.5 PG — SIGNIFICANT CHANGE UP (ref 27–34)
MCHC RBC-ENTMCNC: 35.2 GM/DL — SIGNIFICANT CHANGE UP (ref 32–36)
MCV RBC AUTO: 86.9 FL — SIGNIFICANT CHANGE UP (ref 80–100)
MONOCYTES # BLD AUTO: 0.61 K/UL — SIGNIFICANT CHANGE UP (ref 0–0.9)
MONOCYTES NFR BLD AUTO: 4.4 % — SIGNIFICANT CHANGE UP (ref 2–14)
NEUTROPHILS # BLD AUTO: 12.65 K/UL — HIGH (ref 1.8–7.4)
NEUTROPHILS NFR BLD AUTO: 85.1 % — HIGH (ref 43–77)
NEUTS BAND # BLD: 6.1 % — SIGNIFICANT CHANGE UP (ref 0–8)
NT-PROBNP SERPL-SCNC: 761 PG/ML — HIGH (ref 0–300)
PCO2 BLDV: 34 MMHG — LOW (ref 41–51)
PH BLDV: 7.47 — HIGH (ref 7.32–7.43)
PLAT MORPH BLD: ABNORMAL
PLATELET # BLD AUTO: 238 K/UL — SIGNIFICANT CHANGE UP (ref 150–400)
PO2 BLDV: 34 MMHG — SIGNIFICANT CHANGE UP
POTASSIUM BLDV-SCNC: 2.7 MMOL/L — LOW (ref 3.5–4.9)
POTASSIUM SERPL-MCNC: 3 MMOL/L — LOW (ref 3.5–5.3)
POTASSIUM SERPL-SCNC: 3 MMOL/L — LOW (ref 3.5–5.3)
PROT SERPL-MCNC: 7.6 G/DL — SIGNIFICANT CHANGE UP (ref 6–8.3)
RBC # BLD: 4.19 M/UL — LOW (ref 4.2–5.8)
RBC # FLD: 13 % — SIGNIFICANT CHANGE UP (ref 10.3–14.5)
RBC BLD AUTO: NORMAL — SIGNIFICANT CHANGE UP
SAO2 % BLDV: 65 % — SIGNIFICANT CHANGE UP
SARS-COV-2 RNA SPEC QL NAA+PROBE: DETECTED
SODIUM SERPL-SCNC: 129 MMOL/L — LOW (ref 135–145)
TROPONIN T SERPL-MCNC: <0.01 NG/ML — SIGNIFICANT CHANGE UP (ref 0–0.01)
WBC # BLD: 13.87 K/UL — HIGH (ref 3.8–10.5)
WBC # FLD AUTO: 13.87 K/UL — HIGH (ref 3.8–10.5)

## 2020-04-02 PROCEDURE — 99291 CRITICAL CARE FIRST HOUR: CPT | Mod: 25

## 2020-04-02 PROCEDURE — 71045 X-RAY EXAM CHEST 1 VIEW: CPT | Mod: 26

## 2020-04-02 PROCEDURE — 99285 EMERGENCY DEPT VISIT HI MDM: CPT

## 2020-04-02 RX ORDER — ENOXAPARIN SODIUM 100 MG/ML
40 INJECTION SUBCUTANEOUS EVERY 24 HOURS
Refills: 0 | Status: DISCONTINUED | OUTPATIENT
Start: 2020-04-03 | End: 2020-04-12

## 2020-04-02 RX ORDER — CLOPIDOGREL BISULFATE 75 MG/1
75 TABLET, FILM COATED ORAL DAILY
Refills: 0 | Status: DISCONTINUED | OUTPATIENT
Start: 2020-04-02 | End: 2020-04-15

## 2020-04-02 RX ORDER — HYDROXYCHLOROQUINE SULFATE 200 MG
400 TABLET ORAL EVERY 12 HOURS
Refills: 0 | Status: COMPLETED | OUTPATIENT
Start: 2020-04-02 | End: 2020-04-03

## 2020-04-02 RX ORDER — RANOLAZINE 500 MG/1
500 TABLET, FILM COATED, EXTENDED RELEASE ORAL
Refills: 0 | Status: DISCONTINUED | OUTPATIENT
Start: 2020-04-02 | End: 2020-04-03

## 2020-04-02 RX ORDER — HYDROCHLOROTHIAZIDE 25 MG
12.5 TABLET ORAL DAILY
Refills: 0 | Status: DISCONTINUED | OUTPATIENT
Start: 2020-04-02 | End: 2020-04-03

## 2020-04-02 RX ORDER — THIAMINE MONONITRATE (VIT B1) 100 MG
200 TABLET ORAL
Refills: 0 | Status: DISCONTINUED | OUTPATIENT
Start: 2020-04-02 | End: 2020-04-02

## 2020-04-02 RX ORDER — ACETAMINOPHEN 500 MG
650 TABLET ORAL EVERY 6 HOURS
Refills: 0 | Status: DISCONTINUED | OUTPATIENT
Start: 2020-04-02 | End: 2020-04-03

## 2020-04-02 RX ORDER — ISOSORBIDE MONONITRATE 60 MG/1
30 TABLET, EXTENDED RELEASE ORAL DAILY
Refills: 0 | Status: DISCONTINUED | OUTPATIENT
Start: 2020-04-02 | End: 2020-04-03

## 2020-04-02 RX ORDER — CEFTRIAXONE 500 MG/1
1000 INJECTION, POWDER, FOR SOLUTION INTRAMUSCULAR; INTRAVENOUS ONCE
Refills: 0 | Status: DISCONTINUED | OUTPATIENT
Start: 2020-04-02 | End: 2020-04-02

## 2020-04-02 RX ORDER — AZITHROMYCIN 500 MG/1
500 TABLET, FILM COATED ORAL ONCE
Refills: 0 | Status: COMPLETED | OUTPATIENT
Start: 2020-04-02 | End: 2020-04-02

## 2020-04-02 RX ORDER — PIPERACILLIN AND TAZOBACTAM 4; .5 G/20ML; G/20ML
3.38 INJECTION, POWDER, LYOPHILIZED, FOR SOLUTION INTRAVENOUS ONCE
Refills: 0 | Status: COMPLETED | OUTPATIENT
Start: 2020-04-02 | End: 2020-04-02

## 2020-04-02 RX ORDER — HYDROXYCHLOROQUINE SULFATE 200 MG
TABLET ORAL
Refills: 0 | Status: DISCONTINUED | OUTPATIENT
Start: 2020-04-02 | End: 2020-04-02

## 2020-04-02 RX ORDER — ASPIRIN/CALCIUM CARB/MAGNESIUM 324 MG
81 TABLET ORAL DAILY
Refills: 0 | Status: DISCONTINUED | OUTPATIENT
Start: 2020-04-02 | End: 2020-04-12

## 2020-04-02 RX ORDER — SODIUM CHLORIDE 9 MG/ML
500 INJECTION INTRAMUSCULAR; INTRAVENOUS; SUBCUTANEOUS ONCE
Refills: 0 | Status: COMPLETED | OUTPATIENT
Start: 2020-04-02 | End: 2020-04-02

## 2020-04-02 RX ORDER — HYDROXYCHLOROQUINE SULFATE 200 MG
200 TABLET ORAL EVERY 12 HOURS
Refills: 0 | Status: COMPLETED | OUTPATIENT
Start: 2020-04-03 | End: 2020-04-07

## 2020-04-02 RX ORDER — ALBUTEROL 90 UG/1
2 AEROSOL, METERED ORAL EVERY 4 HOURS
Refills: 0 | Status: DISCONTINUED | OUTPATIENT
Start: 2020-04-02 | End: 2020-04-15

## 2020-04-02 RX ORDER — ALBUTEROL 90 UG/1
1 AEROSOL, METERED ORAL ONCE
Refills: 0 | Status: COMPLETED | OUTPATIENT
Start: 2020-04-02 | End: 2020-04-02

## 2020-04-02 RX ORDER — SODIUM CHLORIDE 9 MG/ML
250 INJECTION INTRAMUSCULAR; INTRAVENOUS; SUBCUTANEOUS ONCE
Refills: 0 | Status: COMPLETED | OUTPATIENT
Start: 2020-04-02 | End: 2020-04-02

## 2020-04-02 RX ORDER — THIAMINE MONONITRATE (VIT B1) 100 MG
200 TABLET ORAL
Refills: 0 | Status: DISCONTINUED | OUTPATIENT
Start: 2020-04-02 | End: 2020-04-03

## 2020-04-02 RX ORDER — ASCORBIC ACID 60 MG
1500 TABLET,CHEWABLE ORAL
Refills: 0 | Status: DISCONTINUED | OUTPATIENT
Start: 2020-04-02 | End: 2020-04-02

## 2020-04-02 RX ORDER — MAGNESIUM SULFATE 500 MG/ML
2 VIAL (ML) INJECTION ONCE
Refills: 0 | Status: COMPLETED | OUTPATIENT
Start: 2020-04-02 | End: 2020-04-02

## 2020-04-02 RX ORDER — ASCORBIC ACID 60 MG
1500 TABLET,CHEWABLE ORAL
Refills: 0 | Status: DISCONTINUED | OUTPATIENT
Start: 2020-04-02 | End: 2020-04-03

## 2020-04-02 RX ORDER — POTASSIUM CHLORIDE 20 MEQ
40 PACKET (EA) ORAL ONCE
Refills: 0 | Status: DISCONTINUED | OUTPATIENT
Start: 2020-04-02 | End: 2020-04-03

## 2020-04-02 RX ORDER — POTASSIUM CHLORIDE 20 MEQ
10 PACKET (EA) ORAL
Refills: 0 | Status: COMPLETED | OUTPATIENT
Start: 2020-04-02 | End: 2020-04-03

## 2020-04-02 RX ORDER — ATORVASTATIN CALCIUM 80 MG/1
80 TABLET, FILM COATED ORAL AT BEDTIME
Refills: 0 | Status: DISCONTINUED | OUTPATIENT
Start: 2020-04-02 | End: 2020-04-03

## 2020-04-02 RX ADMIN — Medication 50 GRAM(S): at 21:00

## 2020-04-02 RX ADMIN — Medication 100 MILLIEQUIVALENT(S): at 22:30

## 2020-04-02 RX ADMIN — Medication 125 MILLIGRAM(S): at 21:21

## 2020-04-02 RX ADMIN — Medication 1500 MILLIGRAM(S): at 22:18

## 2020-04-02 RX ADMIN — ALBUTEROL 1 PUFF(S): 90 AEROSOL, METERED ORAL at 21:17

## 2020-04-02 RX ADMIN — Medication 200 MILLIGRAM(S): at 22:08

## 2020-04-02 RX ADMIN — Medication 400 MILLIGRAM(S): at 22:18

## 2020-04-02 RX ADMIN — PIPERACILLIN AND TAZOBACTAM 200 GRAM(S): 4; .5 INJECTION, POWDER, LYOPHILIZED, FOR SOLUTION INTRAVENOUS at 21:33

## 2020-04-02 RX ADMIN — AZITHROMYCIN 255 MILLIGRAM(S): 500 TABLET, FILM COATED ORAL at 22:07

## 2020-04-02 RX ADMIN — SODIUM CHLORIDE 500 MILLILITER(S): 9 INJECTION INTRAMUSCULAR; INTRAVENOUS; SUBCUTANEOUS at 22:49

## 2020-04-02 RX ADMIN — SODIUM CHLORIDE 250 MILLILITER(S): 9 INJECTION INTRAMUSCULAR; INTRAVENOUS; SUBCUTANEOUS at 21:33

## 2020-04-02 RX ADMIN — Medication 100 MILLIEQUIVALENT(S): at 23:35

## 2020-04-02 RX ADMIN — Medication 650 MILLIGRAM(S): at 22:08

## 2020-04-02 NOTE — CHART NOTE - NSCHARTNOTEFT_GEN_A_CORE
INCOMPLETE NOTE  ICU consult note:  Patient is a 75 year old with COPD, CAD s/p multiple stents (on aspirin, lipitor, metoprolol 100 XL), last EF 55%, who presents due to sob, cough, and fever for 1 week. Patient completed 5 days of azithromycin and cefdinir on an outpatient setting, but sob worsened. In the ED he had fever 100.8, tachypnea 38 bpm and desaturation to 86-88%, placed on nonrebreather with improvement in oxygenation to 93% and RR down to 30 bpm. Also had wheezing on exam, s/p solumedrol 125 mg iv push and magnesium 2 gm. and zosyn times 1.    Physical exam: crackles b/l lower lobes, and mild end expiratory wheezing, no LE swelling, no elevation in JVD    Labs: lactate 3.3, wbc count 14k. COVID 19 and RVP swab sent    Chest xray: Multifocal infiltrates worse on periphery, no pleural effusions.      A/P    1) SOB  with chest xray concern for peripheral infiltrates concern for COVID-19 f/u RVP and COVID 19 PCR  c/w plaquenil, VC, and thiamine. pt already completed azithromycin  proair Q4 prn sob/ wheezing  s/p solumedrol 125 mg IV push        Disposition:____________ ICU consult note:  Patient is a 75 year old with COPD, CAD s/p multiple stents (on aspirin, lipitor, metoprolol 100 XL), last EF 55%, who presents due to sob, cough, and fever for 1 week. Patient completed 5 days of azithromycin and cefdinir on an outpatient setting, but sob worsened. In the ED he had fever 100.8, tachypnea 38 bpm and desaturation to 86-88%, placed on nonrebreather with improvement in oxygenation to 93% and RR down to 30 bpm. Also had wheezing on exam, s/p solumedrol 125 mg iv push and magnesium 2 gm. and zosyn times 1.    Physical exam: crackles b/l lower lobes, and mild end expiratory wheezing, no LE swelling, no elevation in JVD    Labs: lactate 3.3, wbc count 14k. COVID 19 and RVP swab sent    Chest xray: Multifocal infiltrates worse on periphery, no pleural effusions.    home meds: isosorbide mononitrate ER 30 mg, losartan 100 mg, toprol 100 mg, stiolto, singulair, lasix 20 mg Q other day, HCT 12.5 mg, lipitor 80 mg,  HCT 12.5, asp/plavix      A/P    1) SOB  with chest xray concern for peripheral infiltrates concern for COVID-19 f/u RVP and COVID 19 PCR  c/w plaquenil, VC, and thiamine. pt already completed azithromycin  proair Q4 prn sob/ wheezing  s/p solumedrol 125 mg IV push        Disposition: COVID tele

## 2020-04-02 NOTE — ED PROVIDER NOTE - CLINICAL SUMMARY MEDICAL DECISION MAKING FREE TEXT BOX
76 yo male with CAD mid cabg - chf -copd - with fever x 3 days/  + covid per CXR  much improved by 100% NRB  sats 92% RR24 ? icu vs covid tele 76 yo male with CAD mid cabg - chf -copd - with fever x 3 days/  + covid per CXR  much improved by 100% NRB  sats 92% RR24 ? icu vs covid tele -- per dr mckeon covid tele  signout to covid tele will be given by dr nelson - icu res

## 2020-04-02 NOTE — ED ADULT TRIAGE NOTE - ARRIVAL INFO ADDITIONAL COMMENTS
Reported possible contact with symptomatic patient eight days prior. No confirmed COVID patient contact

## 2020-04-02 NOTE — H&P ADULT - PROBLEM SELECTOR PLAN 3
Patient with history of COPD  - c/w Duoneb, Singulair. Presented febrile 100.8, tachypnea 38 bpm and desaturation to 86-88%, lactate 3.3, wbc count 14k. S/p 750cc normal saline in the ED.  - COVID 19 positive  - f/u RVP   - f/u blood cultures  - holding home Lasix 40mg, HCTZ 12.5mg, Imdur 30mg, Metoprolol 100mg in the setting of sepsis presentation. Restart as tolerated. Presented febrile 100.8, tachypnea 38 and desaturation to 86-88%, lactate 3.3, wbc count 14k. S/p 750cc normal saline in the ED.  - COVID 19 positive  - f/u RVP   - f/u blood cultures  - holding home Lasix 40mg, HCTZ 12.5mg, Imdur 30mg, Metoprolol 100mg in the setting of sepsis presentation. Restart as tolerated.

## 2020-04-02 NOTE — ED PROVIDER NOTE - OBJECTIVE STATEMENT
76 yo male with hx of COPD HTN. CHF  CAD- midCAB- with fever chills sob and dry cough x 2 days - has been on zithromax in the past week  also cefdinir as well in the past 2 days  no N/V or diarrhea  no hx of DVT or PE  non smoker  no DM  no prior intubations

## 2020-04-02 NOTE — H&P ADULT - PROBLEM SELECTOR PLAN 4
Patient with history of CAD s/p multiple stents and CABG in 2014   - c/w Aspirin, Plavix, Atorvastatin  - c/w Lasix 40mg, HCTZ 12.5mg, Losartan 100mg, Imdur 30mg, Metoprolol 100mg  - Trend cardiac enzymes to ensure no elevation  - If worsening CP or increase suspicion for ACS will Aspirin and Plavix load  - f/u AM EKG  - CT coronary in AM. Patient with history of CAD s/p multiple stents and CABG in 2014   - c/w Aspirin, Plavix, Atorvastatin  - c/w HCTZ 12.5mg, Imdur 30mg, Ranolazine 500mg BID  - f/u AM EKG Patient with history of CAD s/p multiple stents and CABG in 2014   - c/w Aspirin, Plavix, Atorvastatin  - c/w Lasix 40mg, HCTZ 12.5mg, Losartan 100mg, Imdur 30mg, Metoprolol 100mg  - f/u AM EKG Patient with history of CAD s/p multiple stents and CABG in 2014   - c/w Aspirin, Plavix, Atorvastatin  - c/w Losartan 100mg daily, and Ranolazine 500mg BID  - on home Lasix 40mg, HCTZ 12.5mg, Imdur 30mg, Metoprolol 100mg, holding in the setting of sepsis presentation. Restart as tolerated.  - f/u AM EKG Patient with history of COPD  - c/w Duoneb, Singulair.

## 2020-04-02 NOTE — ED ADULT NURSE NOTE - PSH
Acquired absence of genital organ  S/P orchiectomy  H/O heart artery stent    Other postprocedural status  Cath @ [facility] 5/19/14: 80% pLAD, 70% oD1, widely patent stents in mLCx, RPDA & RPLS. Successful PTCA KARAN pLAD & PTCA ostial D1.  Status post aorto-coronary artery bypass graft  LIMA - LAD midCAB 2/2014 @ [facility]

## 2020-04-02 NOTE — H&P ADULT - PROBLEM SELECTOR PLAN 6
F: s/p 750cc normal saline  E: Replete K<4, Mg<2  N: history of hyperlipidemia  - continue with home atorvastatin 80mg daily Patient with history of HTN, currently normo/hypotensive with adequate MAPs  - c/w Losartan 100mg daily, and Ranolazine 500mg BID  - on home Lasix 40mg, HCTZ 12.5mg, Imdur 30mg, Metoprolol 100mg, holding in the setting of sepsis presentation. Restart as tolerated.

## 2020-04-02 NOTE — H&P ADULT - HISTORY OF PRESENT ILLNESS
75M PMH of COPD (no history of exacerbations or intubations), CAD s/p multiple stents, hypertension who presents due to shortness of breath, cough, and fever for 1 week. Patient completed 5 days of azithromycin and cefdinir on an outpatient setting, but shortness of breath worsened.    ED:   Vitals: T 100.8 /84  RR 38 O2 86% on 4L NC  Labs: Wbc 13.87 Lymphopenic 0.61 Hb 12.8 Plt 238 Na 139 K 3.0 Cl 86 Anion gap 21 BUN/Cr 25/1.22 AST/ALT 62/97 CRP 32.07 Ferritin 1214  Procal 0.35 Lactate 3.4  D-dimer 542  EKG:   Chest Xray showing bilateral patchy consolidations  Interventions: Azithromycin 500mg IVx1, Zosyn 3.375gx1, Solumedrol 125mg IVx1, Tylenol 650mg x1, Mg Sulfate 2g, Potassium chloride 12tais0, 750cc normal saline 75M PMH of COPD (no history of exacerbations or intubations), CAD s/p multiple stents, hypertension who presents due to shortness of breath, cough, and fever for 1 week. Patient follows with Dr. Mera as an outpatient and completed 5 days of azithromycin and cefdinir, last dose 2 days ago. Regardless, his shortness of breath worsened.    ED:   Vitals: T 100.8 /84  RR 38 O2 86% on 4L NC  Labs: Wbc 13.87 Lymphopenic 0.61 Hb 12.8 Plt 238 Na 139 K 3.0 Cl 86 Anion gap 21 BUN/Cr 25/1.22 AST/ALT 62/97 CRP 32.07 Ferritin 1214  Procal 0.35 Lactate 3.4  D-dimer 542  EKG: sinus tachycardia, no acute ischemic changes,   Chest XRAY: bilateral patchy consolidations  Interventions: Azithromycin 500mg IVx1, Zosyn 3.375gx1, Solumedrol 125mg IVx1, Tylenol 650mg x1, Mg Sulfate 2g, Potassium chloride 28vkbw2, 750cc normal saline 75M PMH of COPD (no history of exacerbations or intubations), CAD s/p multiple stents, hypertension who presents due to shortness of breath, cough, and fever for 1 week. Patient follows with Dr. Mera as an outpatient and completed 5 days of azithromycin and cefdinir, last dose 2 days ago and his shortness of breath worsened.    ED:   Vitals: T 100.8 /84  RR 38 O2 86% on 4L NC  Labs: Wbc 13.87 Lymphopenic 0.61 Hb 12.8 Plt 238 Na 139 K 3.0 Cl 86 Anion gap 21 BUN/Cr 25/1.22 AST/ALT 62/97 CRP 32.07 Ferritin 1214  Procal 0.35 Lactate 3.4  D-dimer 542  EKG: sinus tachycardia, no acute ischemic changes,   Chest XRAY: bilateral patchy consolidations  Interventions: Azithromycin 500mg IVx1, Zosyn 3.375gx1, Solumedrol 125mg IVx1, Tylenol 650mg x1, Mg Sulfate 2g, Potassium chloride 77nieo8, 750cc normal saline 75M PMH of COPD (no history of exacerbations or intubations), CAD s/p multiple stents, hypertension who presents due to shortness of breath, cough, and fever for 1 week. Patient follows with Dr. Mera as an outpatient and completed 5 days of azithromycin and cefdinir, last dose 2 days ago and his shortness of breath worsened.    ED:   Vitals: T 100.8 /84  RR 38 O2 86% on 4L NC  Labs: Wbc 13.87 Lymphopenic 0.61 Hb 12.8 Plt 238 Na 139 K 3.0 Cl 86 Anion gap 21 BUN/Cr 25/1.22 AST/ALT 62/97 CRP 32.07 Ferritin 1214  Procal 0.35 Lactate 3.4  D-dimer 542, COVID positive  EKG: sinus tachycardia, no acute ischemic changes,   Chest XRAY: bilateral patchy consolidations  Interventions: Azithromycin 500mg IVx1, Zosyn 3.375gx1, Solumedrol 125mg IVx1, Tylenol 650mg x1, Mg Sulfate 2g, Potassium chloride 43arap0, 750cc normal saline

## 2020-04-02 NOTE — H&P ADULT - NSHPLABSRESULTS_GEN_ALL_CORE
LABS:                         12.8   13.87 )-----------( 238      ( 02 Apr 2020 20:59 )             36.4     04-02    129<L>  |  86<L>  |  25<H>  ----------------------------<  157<H>  3.0<L>   |  22  |  1.22    Ca    9.5      02 Apr 2020 20:59    TPro  7.6  /  Alb  3.6  /  TBili  0.7  /  DBili  x   /  AST  62<H>  /  ALT  47<H>  /  AlkPhos  69  04-02        CARDIAC MARKERS ( 02 Apr 2020 20:59 )  x     / <0.01 ng/mL / 112 U/L / x     / 2.1 ng/mL      Serum Pro-Brain Natriuretic Peptide: 761 pg/mL (04-02 @ 20:59)    Lactate, Blood: 3.4 mmol/L (04-02 @ 20:59)      RADIOLOGY, EKG & ADDITIONAL TESTS:

## 2020-04-02 NOTE — H&P ADULT - PROBLEM SELECTOR PLAN 2
Presenting with fever, nonproductive cough, and shortness of breath x9 days, admitted with hypoxic respiratory distress requiring supplemental oxygen. CT chest showing bilateral peripheral/bronchocentric ground-glass opacities. Currently, 92% on 5L NC.  - thiamine 200mg IV BID  - ascorbic acid 1.5mg IV q6 hours  - azithromycin 250mg PO qd  - Plaquenil 400mg BID for 2 doses and 200mg BID for 8 doses  - daily cbc w/diff/cmp/m/phosph/d-dimer/crp/ferritin. Presenting with fever, nonproductive cough, and shortness of breath x7 days, admitted with hypoxic respiratory distress requiring supplemental oxygen. Chest Xray showing bilateral patchy consolidations. Currently, 93% on NRB.  - thiamine 200mg IV BID  - ascorbic acid 1.5mg IV q6 hours  - Plaquenil 400mg BID for 2 doses and 200mg BID for 8 doses  - completed 5 days of azithromycin as an outpatient  - daily COVID labs Presenting with fever, nonproductive cough, and shortness of breath x7 days, admitted with hypoxic respiratory distress requiring supplemental oxygen. Chest Xray showing bilateral patchy consolidations. Currently, 93% on NRB.  - thiamine 200mg IV BID  - ascorbic acid 1.5mg IV q6 hours  - Plaquenil 400mg BID for 2 doses and 200mg BID for 8 doses  - completed 5 days of azithromycin as an outpatient  - f/u AM EKG for QTC, qtc on 4/2: 402  - daily COVID labs

## 2020-04-02 NOTE — H&P ADULT - PROBLEM SELECTOR PLAN 5
Patient with history of HTN on multiple agents at home per med requisition, currently normo/hypotensive with adequate MAPs  - c/w Lasix 40mg, HCTZ 12.5mg, Losartan 100mg, Imdur 30mg, Metoprolol 100mg. Patient with history of HTN on multiple agents at home per med requisition, currently normo/hypotensive with adequate MAPs  - c/w HCTZ 12.5mg, Imdur 30mg, Ranolazine 500mg BID Patient with history of HTN, currently normo/hypotensive with adequate MAPs  - c/w Lasix 40mg, HCTZ 12.5mg, Losartan 100mg, Imdur 30mg, Metoprolol 100mg, Ranolazine 500mg BID Patient with history of HTN, currently normo/hypotensive with adequate MAPs  - c/w Losartan 100mg daily, and Ranolazine 500mg BID  - on home Lasix 40mg, HCTZ 12.5mg, Imdur 30mg, Metoprolol 100mg, holding in the setting of sepsis presentation. Restart as tolerated. Patient with history of CAD s/p multiple stents and CABG in 2014   - c/w Aspirin, Plavix, Atorvastatin  - c/w Losartan 100mg daily, and Ranolazine 500mg BID  - on home Lasix 40mg, HCTZ 12.5mg, Imdur 30mg, Metoprolol 100mg, holding in the setting of sepsis presentation. Restart as tolerated.  - f/u AM EKG

## 2020-04-02 NOTE — ED PROVIDER NOTE - CARE PLAN
Principal Discharge DX:	COPD exacerbation  Secondary Diagnosis:	Hypoxia Principal Discharge DX:	COPD exacerbation  Secondary Diagnosis:	Hypoxia  Secondary Diagnosis:	Pneumonia  Secondary Diagnosis:	Hypokalemia

## 2020-04-02 NOTE — H&P ADULT - PROBLEM SELECTOR PLAN 1
Presenting with subjective fevers for 7 days, fatigue, nonproductive cough, and progressive worsening of shortness of breath admitted with acute hypoxic respiratory failure, admitted with hypoxic respiratory distress requiring supplemental oxygen. Crackles in the BL lung bases, decreased air entry bilaterally, and expiratory wheezes. In the ED, tachypneic to 38 and desaturation to 86-88% on 6L NC, placed on nonrebreather with improvement in oxygenation to 93% RR down to 30. Chest Xray showing bilateral patchy consolidation likely secondary to COVID. Currently, 93% on NRB.  - monitor respiratory status  - COVID postiev Presenting with subjective fevers for 7 days, fatigue, nonproductive cough, and progressive worsening of shortness of breath admitted with acute hypoxic respiratory failure, admitted with hypoxic respiratory distress requiring supplemental oxygen. Crackles in the BL lung bases, decreased air entry bilaterally, and expiratory wheezes. In the ED, tachypneic to 38 and desaturation to 86-88% on 6L NC, placed on nonrebreather with improvement in oxygenation to 93% RR down to 30. Received solumedrol 125mg IVPx1. Chest Xray showing bilateral patchy consolidations likely secondary to COVID. Currently, 93% on NRB.  - monitor respiratory status  - COVID positive

## 2020-04-02 NOTE — H&P ADULT - PROBLEM SELECTOR PLAN 7
F: s/p 750cc normal saline  E: Replete K<4, Mg<2  N: F: s/p 750cc normal saline  E: Replete K<4, Mg<2  N: DASH TLC  DVT PPX: Lovenox 40mg Subq daily  DISPO: COVID tele history of hyperlipidemia  - continue with home atorvastatin 80mg daily

## 2020-04-02 NOTE — H&P ADULT - PROBLEM SELECTOR PLAN 8
F: s/p 750cc normal saline  E: Replete K<4, Mg<2  N: DASH TLC  DVT PPX: Lovenox 40mg Subq daily  DISPO: COVID tele

## 2020-04-02 NOTE — H&P ADULT - ASSESSMENT
75M PMH of COPD (no history of exacerbations or intubations), CAD s/p multiple stents, hypertension who presents due to shortness of breath, cough, and fever for 1 week admitted for acute hypoxic respiratory failure secondary to COVID-19.    Continue ASA 81mg daily  - Continue Plavix 75mg daily  - Continue Atorvastatin 80mg daily  - Continue Imdur 30mg daily at bedtime.  - Continue Toprol XL 100mg daily  - Continue Ranolazine 500mg q12HRS  - Continue aggressive medical management  - Obtain Nuclear stress test    HTN: BP at ACC/AHA 2017 guideline target  - Continue Losrtan 100mg daily  - Continue HCTZ 12.5mg daily  - Continue Imdur 30mg at bedtime  - Continue Lasix 20mg daily 75M PMH of COPD (no history of exacerbations or intubations), CAD s/p multiple stents, hypertension who presents due to shortness of breath, cough, and fever for 1 week admitted for acute hypoxic respiratory failure secondary to COVID-19.

## 2020-04-02 NOTE — ED ADULT NURSE NOTE - OBJECTIVE STATEMENT
Patient to the ED with complaint of prod cough and SOB, fever for 9 days on abx with no relief of sx.

## 2020-04-02 NOTE — H&P ADULT - NSHPPHYSICALEXAM_GEN_ALL_CORE
Vital Signs Last 12 Hrs  T(F): 99.8 (04-02-20 @ 23:40), Max: 100.7 (04-02-20 @ 21:08)  HR: 98 (04-02-20 @ 23:40) (98 - 113)  BP: 127/88 (04-02-20 @ 23:40) (127/88 - 136/80)  BP(mean): --  RR: 20 (04-02-20 @ 23:40) (20 - 28)  SpO2: 95% (04-02-20 @ 23:40) (81% - 95%)    PHYSICAL EXAM:  Constitutional: comfortable in bed, mildly tachypneic on nonrebreather, not in distress  HEENT: NC/AT, PERRLA, EOMI, no conjunctival pallor or scleral icterus, MMM  Neck: Supple, no JVD  Respiratory: crackles b/l lower lobes, and mild end expiratory wheezing  Cardiovascular: RRR, normal S1 and S2, no m/r/g.   Gastrointestinal: +BS, soft NTND, no guarding or rebound tenderness, no palpable masses   Extremities: wwp; no cyanosis, clubbing or edema.   Vascular: Pulses equal and strong throughout.   Neurological: AAOx3, no CN deficits, strength and sensation intact throughout.   Skin: No gross skin abnormalities or rashes Vital Signs Last 12 Hrs  T(F): 99.8 (04-02-20 @ 23:40), Max: 100.7 (04-02-20 @ 21:08)  HR: 98 (04-02-20 @ 23:40) (98 - 113)  BP: 127/88 (04-02-20 @ 23:40) (127/88 - 136/80)  BP(mean): --  RR: 20 (04-02-20 @ 23:40) (20 - 28)  SpO2: 95% (04-02-20 @ 23:40) (81% - 95%)    PHYSICAL EXAM:  Constitutional: comfortable in bed, mildly tachypneic on nonrebreather, not in distress  HEENT: NC/AT, PERRLA, EOMI, no conjunctival pallor or scleral icterus, MMM  Neck: Supple, no JVD  Respiratory: crackles b/l lower lobes, and mild end expiratory wheezing  Cardiovascular: RRR, normal S1 and S2, no m/r/g.   Gastrointestinal: +BS, soft distended, nontender, no guarding or rebound tenderness, no palpable masses   Extremities: wwp; no cyanosis, clubbing or edema.   Vascular: Pulses equal and strong throughout.   Neurological: AAOx3, no CN deficits, strength and sensation intact throughout.   Skin: No gross skin abnormalities or rashes

## 2020-04-03 DIAGNOSIS — A41.9 SEPSIS, UNSPECIFIED ORGANISM: ICD-10-CM

## 2020-04-03 DIAGNOSIS — J12.9 VIRAL PNEUMONIA, UNSPECIFIED: ICD-10-CM

## 2020-04-03 DIAGNOSIS — J44.1 CHRONIC OBSTRUCTIVE PULMONARY DISEASE WITH (ACUTE) EXACERBATION: ICD-10-CM

## 2020-04-03 DIAGNOSIS — E78.5 HYPERLIPIDEMIA, UNSPECIFIED: ICD-10-CM

## 2020-04-03 DIAGNOSIS — I25.10 ATHEROSCLEROTIC HEART DISEASE OF NATIVE CORONARY ARTERY WITHOUT ANGINA PECTORIS: ICD-10-CM

## 2020-04-03 LAB
ALBUMIN SERPL ELPH-MCNC: 2.9 G/DL — LOW (ref 3.3–5)
ALBUMIN SERPL ELPH-MCNC: 2.9 G/DL — LOW (ref 3.3–5)
ALP SERPL-CCNC: 57 U/L — SIGNIFICANT CHANGE UP (ref 40–120)
ALP SERPL-CCNC: 61 U/L — SIGNIFICANT CHANGE UP (ref 40–120)
ALT FLD-CCNC: 38 U/L — SIGNIFICANT CHANGE UP (ref 10–45)
ALT FLD-CCNC: 39 U/L — SIGNIFICANT CHANGE UP (ref 10–45)
ANION GAP SERPL CALC-SCNC: 13 MMOL/L — SIGNIFICANT CHANGE UP (ref 5–17)
ANION GAP SERPL CALC-SCNC: 17 MMOL/L — SIGNIFICANT CHANGE UP (ref 5–17)
ANION GAP SERPL CALC-SCNC: 18 MMOL/L — HIGH (ref 5–17)
APTT BLD: 35.4 SEC — SIGNIFICANT CHANGE UP (ref 27.5–36.3)
AST SERPL-CCNC: 61 U/L — HIGH (ref 10–40)
AST SERPL-CCNC: 61 U/L — HIGH (ref 10–40)
BASE EXCESS BLDA CALC-SCNC: 1.3 MMOL/L — SIGNIFICANT CHANGE UP (ref -2–3)
BASOPHILS # BLD AUTO: 0 K/UL — SIGNIFICANT CHANGE UP (ref 0–0.2)
BASOPHILS # BLD AUTO: 0 K/UL — SIGNIFICANT CHANGE UP (ref 0–0.2)
BASOPHILS NFR BLD AUTO: 0 % — SIGNIFICANT CHANGE UP (ref 0–2)
BASOPHILS NFR BLD AUTO: 0 % — SIGNIFICANT CHANGE UP (ref 0–2)
BILIRUB SERPL-MCNC: 0.4 MG/DL — SIGNIFICANT CHANGE UP (ref 0.2–1.2)
BILIRUB SERPL-MCNC: 0.5 MG/DL — SIGNIFICANT CHANGE UP (ref 0.2–1.2)
BUN SERPL-MCNC: 23 MG/DL — SIGNIFICANT CHANGE UP (ref 7–23)
BUN SERPL-MCNC: 23 MG/DL — SIGNIFICANT CHANGE UP (ref 7–23)
BUN SERPL-MCNC: 27 MG/DL — HIGH (ref 7–23)
CALCIUM SERPL-MCNC: 8 MG/DL — LOW (ref 8.4–10.5)
CALCIUM SERPL-MCNC: 8.2 MG/DL — LOW (ref 8.4–10.5)
CALCIUM SERPL-MCNC: 8.9 MG/DL — SIGNIFICANT CHANGE UP (ref 8.4–10.5)
CHLORIDE SERPL-SCNC: 87 MMOL/L — LOW (ref 96–108)
CHLORIDE SERPL-SCNC: 91 MMOL/L — LOW (ref 96–108)
CHLORIDE SERPL-SCNC: 97 MMOL/L — SIGNIFICANT CHANGE UP (ref 96–108)
CO2 SERPL-SCNC: 20 MMOL/L — LOW (ref 22–31)
CO2 SERPL-SCNC: 23 MMOL/L — SIGNIFICANT CHANGE UP (ref 22–31)
CO2 SERPL-SCNC: 24 MMOL/L — SIGNIFICANT CHANGE UP (ref 22–31)
CREAT SERPL-MCNC: 1.05 MG/DL — SIGNIFICANT CHANGE UP (ref 0.5–1.3)
CREAT SERPL-MCNC: 1.07 MG/DL — SIGNIFICANT CHANGE UP (ref 0.5–1.3)
CREAT SERPL-MCNC: 1.22 MG/DL — SIGNIFICANT CHANGE UP (ref 0.5–1.3)
CRP SERPL-MCNC: 30.37 MG/DL — HIGH (ref 0–0.4)
D DIMER BLD IA.RAPID-MCNC: 739 NG/ML DDU — HIGH
EOSINOPHIL # BLD AUTO: 0 K/UL — SIGNIFICANT CHANGE UP (ref 0–0.5)
EOSINOPHIL # BLD AUTO: 0 K/UL — SIGNIFICANT CHANGE UP (ref 0–0.5)
EOSINOPHIL NFR BLD AUTO: 0 % — SIGNIFICANT CHANGE UP (ref 0–6)
EOSINOPHIL NFR BLD AUTO: 0 % — SIGNIFICANT CHANGE UP (ref 0–6)
FERRITIN SERPL-MCNC: 1323 NG/ML — HIGH (ref 30–400)
GLUCOSE SERPL-MCNC: 208 MG/DL — HIGH (ref 70–99)
GLUCOSE SERPL-MCNC: 228 MG/DL — HIGH (ref 70–99)
GLUCOSE SERPL-MCNC: 271 MG/DL — HIGH (ref 70–99)
HCO3 BLDA-SCNC: 25 MMOL/L — SIGNIFICANT CHANGE UP (ref 21–28)
HCT VFR BLD CALC: 32.2 % — LOW (ref 39–50)
HCT VFR BLD CALC: 32.8 % — LOW (ref 39–50)
HCV AB S/CO SERPL IA: 0.17 S/CO — SIGNIFICANT CHANGE UP
HCV AB SERPL-IMP: SIGNIFICANT CHANGE UP
HGB BLD-MCNC: 11.3 G/DL — LOW (ref 13–17)
HGB BLD-MCNC: 11.6 G/DL — LOW (ref 13–17)
INR BLD: 1.27 — HIGH (ref 0.88–1.16)
LACTATE SERPL-SCNC: 1.8 MMOL/L — SIGNIFICANT CHANGE UP (ref 0.5–2)
LACTATE SERPL-SCNC: 4.6 MMOL/L — CRITICAL HIGH (ref 0.5–2)
LACTATE SERPL-SCNC: 5.7 MMOL/L — CRITICAL HIGH (ref 0.5–2)
LACTATE SERPL-SCNC: 6.3 MMOL/L — CRITICAL HIGH (ref 0.5–2)
LDH SERPL L TO P-CCNC: 532 U/L — HIGH (ref 50–242)
LYMPHOCYTES # BLD AUTO: 0.29 K/UL — LOW (ref 1–3.3)
LYMPHOCYTES # BLD AUTO: 0.64 K/UL — LOW (ref 1–3.3)
LYMPHOCYTES # BLD AUTO: 1.8 % — LOW (ref 13–44)
LYMPHOCYTES # BLD AUTO: 3.5 % — LOW (ref 13–44)
MAGNESIUM SERPL-MCNC: 2.4 MG/DL — SIGNIFICANT CHANGE UP (ref 1.6–2.6)
MCHC RBC-ENTMCNC: 30.6 PG — SIGNIFICANT CHANGE UP (ref 27–34)
MCHC RBC-ENTMCNC: 30.9 PG — SIGNIFICANT CHANGE UP (ref 27–34)
MCHC RBC-ENTMCNC: 35.1 GM/DL — SIGNIFICANT CHANGE UP (ref 32–36)
MCHC RBC-ENTMCNC: 35.4 GM/DL — SIGNIFICANT CHANGE UP (ref 32–36)
MCV RBC AUTO: 87.2 FL — SIGNIFICANT CHANGE UP (ref 80–100)
MCV RBC AUTO: 87.3 FL — SIGNIFICANT CHANGE UP (ref 80–100)
MONOCYTES # BLD AUTO: 0.56 K/UL — SIGNIFICANT CHANGE UP (ref 0–0.9)
MONOCYTES # BLD AUTO: 1.13 K/UL — HIGH (ref 0–0.9)
MONOCYTES NFR BLD AUTO: 3.5 % — SIGNIFICANT CHANGE UP (ref 2–14)
MONOCYTES NFR BLD AUTO: 6.2 % — SIGNIFICANT CHANGE UP (ref 2–14)
NEUTROPHILS # BLD AUTO: 15.27 K/UL — HIGH (ref 1.8–7.4)
NEUTROPHILS # BLD AUTO: 16.35 K/UL — HIGH (ref 1.8–7.4)
NEUTROPHILS NFR BLD AUTO: 89.4 % — HIGH (ref 43–77)
NEUTROPHILS NFR BLD AUTO: 94.7 % — HIGH (ref 43–77)
OSMOLALITY SERPL: 284 MOSMOL/KG — SIGNIFICANT CHANGE UP (ref 280–301)
PCO2 BLDA: 38 MMHG — SIGNIFICANT CHANGE UP (ref 35–48)
PH BLDA: 7.44 — SIGNIFICANT CHANGE UP (ref 7.35–7.45)
PHOSPHATE SERPL-MCNC: 3.7 MG/DL — SIGNIFICANT CHANGE UP (ref 2.5–4.5)
PLATELET # BLD AUTO: 219 K/UL — SIGNIFICANT CHANGE UP (ref 150–400)
PLATELET # BLD AUTO: 220 K/UL — SIGNIFICANT CHANGE UP (ref 150–400)
PO2 BLDA: 61 MMHG — LOW (ref 83–108)
POTASSIUM SERPL-MCNC: 3.2 MMOL/L — LOW (ref 3.5–5.3)
POTASSIUM SERPL-MCNC: 3.3 MMOL/L — LOW (ref 3.5–5.3)
POTASSIUM SERPL-MCNC: 3.4 MMOL/L — LOW (ref 3.5–5.3)
POTASSIUM SERPL-SCNC: 3.2 MMOL/L — LOW (ref 3.5–5.3)
POTASSIUM SERPL-SCNC: 3.3 MMOL/L — LOW (ref 3.5–5.3)
POTASSIUM SERPL-SCNC: 3.4 MMOL/L — LOW (ref 3.5–5.3)
PROT SERPL-MCNC: 6.3 G/DL — SIGNIFICANT CHANGE UP (ref 6–8.3)
PROT SERPL-MCNC: 6.3 G/DL — SIGNIFICANT CHANGE UP (ref 6–8.3)
PROTHROM AB SERPL-ACNC: 14.6 SEC — HIGH (ref 10–12.9)
RBC # BLD: 3.69 M/UL — LOW (ref 4.2–5.8)
RBC # BLD: 3.76 M/UL — LOW (ref 4.2–5.8)
RBC # FLD: 13.2 % — SIGNIFICANT CHANGE UP (ref 10.3–14.5)
RBC # FLD: 13.2 % — SIGNIFICANT CHANGE UP (ref 10.3–14.5)
SAO2 % BLDA: 92 % — LOW (ref 95–100)
SODIUM SERPL-SCNC: 128 MMOL/L — LOW (ref 135–145)
SODIUM SERPL-SCNC: 128 MMOL/L — LOW (ref 135–145)
SODIUM SERPL-SCNC: 134 MMOL/L — LOW (ref 135–145)
WBC # BLD: 16.12 K/UL — HIGH (ref 3.8–10.5)
WBC # BLD: 18.29 K/UL — HIGH (ref 3.8–10.5)
WBC # FLD AUTO: 16.12 K/UL — HIGH (ref 3.8–10.5)
WBC # FLD AUTO: 18.29 K/UL — HIGH (ref 3.8–10.5)

## 2020-04-03 PROCEDURE — 74019 RADEX ABDOMEN 2 VIEWS: CPT | Mod: 26,77

## 2020-04-03 PROCEDURE — 99233 SBSQ HOSP IP/OBS HIGH 50: CPT | Mod: 25

## 2020-04-03 PROCEDURE — 74019 RADEX ABDOMEN 2 VIEWS: CPT | Mod: 26

## 2020-04-03 PROCEDURE — 36556 INSERT NON-TUNNEL CV CATH: CPT

## 2020-04-03 PROCEDURE — 93010 ELECTROCARDIOGRAM REPORT: CPT

## 2020-04-03 PROCEDURE — 71045 X-RAY EXAM CHEST 1 VIEW: CPT | Mod: 26,77

## 2020-04-03 PROCEDURE — 71045 X-RAY EXAM CHEST 1 VIEW: CPT | Mod: 26

## 2020-04-03 PROCEDURE — 36620 INSERTION CATHETER ARTERY: CPT

## 2020-04-03 RX ORDER — DEXTROSE 50 % IN WATER 50 %
15 SYRINGE (ML) INTRAVENOUS ONCE
Refills: 0 | Status: DISCONTINUED | OUTPATIENT
Start: 2020-04-03 | End: 2020-04-15

## 2020-04-03 RX ORDER — HYDROXYCHLOROQUINE SULFATE 200 MG
400 TABLET ORAL ONCE
Refills: 0 | Status: COMPLETED | OUTPATIENT
Start: 2020-04-03 | End: 2020-04-03

## 2020-04-03 RX ORDER — FUROSEMIDE 40 MG
20 TABLET ORAL DAILY
Refills: 0 | Status: DISCONTINUED | OUTPATIENT
Start: 2020-04-03 | End: 2020-04-03

## 2020-04-03 RX ORDER — SODIUM CHLORIDE 9 MG/ML
1000 INJECTION, SOLUTION INTRAVENOUS
Refills: 0 | Status: DISCONTINUED | OUTPATIENT
Start: 2020-04-03 | End: 2020-04-15

## 2020-04-03 RX ORDER — THIAMINE MONONITRATE (VIT B1) 100 MG
100 TABLET ORAL
Refills: 0 | Status: DISCONTINUED | OUTPATIENT
Start: 2020-04-03 | End: 2020-04-03

## 2020-04-03 RX ORDER — SENNA PLUS 8.6 MG/1
1 TABLET ORAL ONCE
Refills: 0 | Status: DISCONTINUED | OUTPATIENT
Start: 2020-04-03 | End: 2020-04-03

## 2020-04-03 RX ORDER — SENNA PLUS 8.6 MG/1
2 TABLET ORAL AT BEDTIME
Refills: 0 | Status: DISCONTINUED | OUTPATIENT
Start: 2020-04-03 | End: 2020-04-05

## 2020-04-03 RX ORDER — DEXTROSE 50 % IN WATER 50 %
12.5 SYRINGE (ML) INTRAVENOUS ONCE
Refills: 0 | Status: DISCONTINUED | OUTPATIENT
Start: 2020-04-03 | End: 2020-04-15

## 2020-04-03 RX ORDER — AZITHROMYCIN 500 MG/1
250 TABLET, FILM COATED ORAL DAILY
Refills: 0 | Status: DISCONTINUED | OUTPATIENT
Start: 2020-04-03 | End: 2020-04-03

## 2020-04-03 RX ORDER — PROPOFOL 10 MG/ML
33.33 INJECTION, EMULSION INTRAVENOUS
Qty: 1000 | Refills: 0 | Status: DISCONTINUED | OUTPATIENT
Start: 2020-04-03 | End: 2020-04-08

## 2020-04-03 RX ORDER — MIDAZOLAM HYDROCHLORIDE 1 MG/ML
2 INJECTION, SOLUTION INTRAMUSCULAR; INTRAVENOUS ONCE
Refills: 0 | Status: DISCONTINUED | OUTPATIENT
Start: 2020-04-03 | End: 2020-04-03

## 2020-04-03 RX ORDER — POLYETHYLENE GLYCOL 3350 17 G/17G
17 POWDER, FOR SOLUTION ORAL ONCE
Refills: 0 | Status: COMPLETED | OUTPATIENT
Start: 2020-04-03 | End: 2020-04-03

## 2020-04-03 RX ORDER — NOREPINEPHRINE BITARTRATE/D5W 8 MG/250ML
0.05 PLASTIC BAG, INJECTION (ML) INTRAVENOUS
Qty: 8 | Refills: 0 | Status: DISCONTINUED | OUTPATIENT
Start: 2020-04-03 | End: 2020-04-08

## 2020-04-03 RX ORDER — POTASSIUM CHLORIDE 20 MEQ
20 PACKET (EA) ORAL ONCE
Refills: 0 | Status: COMPLETED | OUTPATIENT
Start: 2020-04-03 | End: 2020-04-03

## 2020-04-03 RX ORDER — FAMOTIDINE 10 MG/ML
20 INJECTION INTRAVENOUS EVERY 12 HOURS
Refills: 0 | Status: DISCONTINUED | OUTPATIENT
Start: 2020-04-03 | End: 2020-04-15

## 2020-04-03 RX ORDER — THIAMINE MONONITRATE (VIT B1) 100 MG
200 TABLET ORAL
Refills: 0 | Status: DISCONTINUED | OUTPATIENT
Start: 2020-04-03 | End: 2020-04-03

## 2020-04-03 RX ORDER — DEXTROSE 50 % IN WATER 50 %
25 SYRINGE (ML) INTRAVENOUS ONCE
Refills: 0 | Status: DISCONTINUED | OUTPATIENT
Start: 2020-04-03 | End: 2020-04-15

## 2020-04-03 RX ORDER — AZITHROMYCIN 500 MG/1
250 TABLET, FILM COATED ORAL EVERY 24 HOURS
Refills: 0 | Status: COMPLETED | OUTPATIENT
Start: 2020-04-03 | End: 2020-04-06

## 2020-04-03 RX ORDER — ASCORBIC ACID 60 MG
1500 TABLET,CHEWABLE ORAL
Refills: 0 | Status: DISCONTINUED | OUTPATIENT
Start: 2020-04-03 | End: 2020-04-03

## 2020-04-03 RX ORDER — SODIUM CHLORIDE 9 MG/ML
250 INJECTION INTRAMUSCULAR; INTRAVENOUS; SUBCUTANEOUS ONCE
Refills: 0 | Status: COMPLETED | OUTPATIENT
Start: 2020-04-03 | End: 2020-04-03

## 2020-04-03 RX ORDER — LOSARTAN POTASSIUM 100 MG/1
100 TABLET, FILM COATED ORAL DAILY
Refills: 0 | Status: DISCONTINUED | OUTPATIENT
Start: 2020-04-03 | End: 2020-04-03

## 2020-04-03 RX ORDER — GLUCAGON INJECTION, SOLUTION 0.5 MG/.1ML
1 INJECTION, SOLUTION SUBCUTANEOUS ONCE
Refills: 0 | Status: DISCONTINUED | OUTPATIENT
Start: 2020-04-03 | End: 2020-04-15

## 2020-04-03 RX ORDER — VANCOMYCIN HCL 1 G
1000 VIAL (EA) INTRAVENOUS EVERY 12 HOURS
Refills: 0 | Status: COMPLETED | OUTPATIENT
Start: 2020-04-03 | End: 2020-04-03

## 2020-04-03 RX ORDER — CHLORHEXIDINE GLUCONATE 213 G/1000ML
15 SOLUTION TOPICAL EVERY 12 HOURS
Refills: 0 | Status: DISCONTINUED | OUTPATIENT
Start: 2020-04-03 | End: 2020-04-15

## 2020-04-03 RX ORDER — FENTANYL CITRATE 50 UG/ML
0.5 INJECTION INTRAVENOUS
Qty: 2500 | Refills: 0 | Status: DISCONTINUED | OUTPATIENT
Start: 2020-04-03 | End: 2020-04-07

## 2020-04-03 RX ORDER — PIPERACILLIN AND TAZOBACTAM 4; .5 G/20ML; G/20ML
3.38 INJECTION, POWDER, LYOPHILIZED, FOR SOLUTION INTRAVENOUS EVERY 8 HOURS
Refills: 0 | Status: DISCONTINUED | OUTPATIENT
Start: 2020-04-03 | End: 2020-04-04

## 2020-04-03 RX ORDER — PIPERACILLIN AND TAZOBACTAM 4; .5 G/20ML; G/20ML
3.38 INJECTION, POWDER, LYOPHILIZED, FOR SOLUTION INTRAVENOUS ONCE
Refills: 0 | Status: COMPLETED | OUTPATIENT
Start: 2020-04-03 | End: 2020-04-03

## 2020-04-03 RX ORDER — POTASSIUM CHLORIDE 20 MEQ
40 PACKET (EA) ORAL
Refills: 0 | Status: COMPLETED | OUTPATIENT
Start: 2020-04-03 | End: 2020-04-03

## 2020-04-03 RX ORDER — POTASSIUM CHLORIDE 20 MEQ
20 PACKET (EA) ORAL
Refills: 0 | Status: COMPLETED | OUTPATIENT
Start: 2020-04-03 | End: 2020-04-03

## 2020-04-03 RX ORDER — INSULIN LISPRO 100/ML
VIAL (ML) SUBCUTANEOUS EVERY 6 HOURS
Refills: 0 | Status: DISCONTINUED | OUTPATIENT
Start: 2020-04-03 | End: 2020-04-15

## 2020-04-03 RX ORDER — ACETAMINOPHEN 500 MG
1000 TABLET ORAL EVERY 6 HOURS
Refills: 0 | Status: COMPLETED | OUTPATIENT
Start: 2020-04-03 | End: 2020-04-08

## 2020-04-03 RX ORDER — FUROSEMIDE 40 MG
20 TABLET ORAL ONCE
Refills: 0 | Status: DISCONTINUED | OUTPATIENT
Start: 2020-04-03 | End: 2020-04-03

## 2020-04-03 RX ORDER — POTASSIUM CHLORIDE 20 MEQ
40 PACKET (EA) ORAL ONCE
Refills: 0 | Status: COMPLETED | OUTPATIENT
Start: 2020-04-03 | End: 2020-04-03

## 2020-04-03 RX ORDER — SODIUM CHLORIDE 9 MG/ML
500 INJECTION, SOLUTION INTRAVENOUS ONCE
Refills: 0 | Status: DISCONTINUED | OUTPATIENT
Start: 2020-04-03 | End: 2020-04-03

## 2020-04-03 RX ORDER — FENTANYL CITRATE 50 UG/ML
100 INJECTION INTRAVENOUS ONCE
Refills: 0 | Status: DISCONTINUED | OUTPATIENT
Start: 2020-04-03 | End: 2020-04-03

## 2020-04-03 RX ORDER — POLYETHYLENE GLYCOL 3350 17 G/17G
17 POWDER, FOR SOLUTION ORAL DAILY
Refills: 0 | Status: DISCONTINUED | OUTPATIENT
Start: 2020-04-03 | End: 2020-04-05

## 2020-04-03 RX ADMIN — Medication 400 MILLIGRAM(S): at 13:08

## 2020-04-03 RX ADMIN — CHLORHEXIDINE GLUCONATE 15 MILLILITER(S): 213 SOLUTION TOPICAL at 18:15

## 2020-04-03 RX ADMIN — CLOPIDOGREL BISULFATE 75 MILLIGRAM(S): 75 TABLET, FILM COATED ORAL at 13:11

## 2020-04-03 RX ADMIN — Medication 40 MILLIGRAM(S): at 18:13

## 2020-04-03 RX ADMIN — Medication 1500 MILLIGRAM(S): at 00:17

## 2020-04-03 RX ADMIN — Medication 153 MILLIGRAM(S): at 14:35

## 2020-04-03 RX ADMIN — Medication 40 MILLIEQUIVALENT(S): at 18:13

## 2020-04-03 RX ADMIN — SODIUM CHLORIDE 1000 MILLILITER(S): 9 INJECTION INTRAMUSCULAR; INTRAVENOUS; SUBCUTANEOUS at 02:40

## 2020-04-03 RX ADMIN — Medication 250 MILLIGRAM(S): at 18:14

## 2020-04-03 RX ADMIN — Medication 6: at 14:33

## 2020-04-03 RX ADMIN — Medication 40 MILLIGRAM(S): at 13:07

## 2020-04-03 RX ADMIN — PIPERACILLIN AND TAZOBACTAM 200 GRAM(S): 4; .5 INJECTION, POWDER, LYOPHILIZED, FOR SOLUTION INTRAVENOUS at 03:00

## 2020-04-03 RX ADMIN — Medication 250 MILLIGRAM(S): at 07:16

## 2020-04-03 RX ADMIN — Medication 100 MILLIEQUIVALENT(S): at 13:08

## 2020-04-03 RX ADMIN — Medication 1 ENEMA: at 04:12

## 2020-04-03 RX ADMIN — SENNA PLUS 2 TABLET(S): 8.6 TABLET ORAL at 21:54

## 2020-04-03 RX ADMIN — Medication 250 MILLIGRAM(S): at 03:30

## 2020-04-03 RX ADMIN — ENOXAPARIN SODIUM 40 MILLIGRAM(S): 100 INJECTION SUBCUTANEOUS at 07:16

## 2020-04-03 RX ADMIN — PIPERACILLIN AND TAZOBACTAM 25 GRAM(S): 4; .5 INJECTION, POWDER, LYOPHILIZED, FOR SOLUTION INTRAVENOUS at 07:16

## 2020-04-03 RX ADMIN — Medication 100 MILLIEQUIVALENT(S): at 09:00

## 2020-04-03 RX ADMIN — FENTANYL CITRATE 100 MICROGRAM(S): 50 INJECTION INTRAVENOUS at 05:35

## 2020-04-03 RX ADMIN — FAMOTIDINE 20 MILLIGRAM(S): 10 INJECTION INTRAVENOUS at 18:16

## 2020-04-03 RX ADMIN — Medication 100 MILLIEQUIVALENT(S): at 14:36

## 2020-04-03 RX ADMIN — Medication 81 MILLIGRAM(S): at 13:11

## 2020-04-03 RX ADMIN — POLYETHYLENE GLYCOL 3350 17 GRAM(S): 17 POWDER, FOR SOLUTION ORAL at 17:18

## 2020-04-03 RX ADMIN — Medication 200 MILLIGRAM(S): at 21:55

## 2020-04-03 RX ADMIN — Medication 2: at 18:14

## 2020-04-03 RX ADMIN — MIDAZOLAM HYDROCHLORIDE 2 MILLIGRAM(S): 1 INJECTION, SOLUTION INTRAMUSCULAR; INTRAVENOUS at 06:00

## 2020-04-03 RX ADMIN — PIPERACILLIN AND TAZOBACTAM 25 GRAM(S): 4; .5 INJECTION, POWDER, LYOPHILIZED, FOR SOLUTION INTRAVENOUS at 13:08

## 2020-04-03 RX ADMIN — AZITHROMYCIN 250 MILLIGRAM(S): 500 TABLET, FILM COATED ORAL at 14:34

## 2020-04-03 RX ADMIN — PIPERACILLIN AND TAZOBACTAM 25 GRAM(S): 4; .5 INJECTION, POWDER, LYOPHILIZED, FOR SOLUTION INTRAVENOUS at 21:54

## 2020-04-03 RX ADMIN — Medication 100 MILLIEQUIVALENT(S): at 02:38

## 2020-04-03 NOTE — CHART NOTE - NSCHARTNOTEFT_GEN_A_CORE
[facility] Tele to 2U Transfer Note    75M PMH of COPD (no history of exacerbations or intubations), CAD s/p multiple stents, hypertension who presents due to shortness of breath, cough, and fever for 1 week admitted with acute hypoxic respiratory failure secondary to COVID-19 and suspected underlying bacterial pneumonia. Patient was started on vanc and zosyn in addition to Plaquenil. He completed a 5 day course of azithromycin as an outpatient. On admission, patient initially required NRB and was tachypneic to 30, satting at 93% with elevated lactate in the setting of abdominal distention. CXR showed extensive bilateral interstitial opacities which may represent pulmonary venous congestion or interstitial infiltrates. Abdominal xray showing no bowel obstruction but intraperitoneal free air cannot be excluded. On reevaluation, given persistently elevated lactate and increased work of breathing despite BIPAP, patient was subsequently intubated and transferred to ICU (2 URIS) for further monitoring.

## 2020-04-03 NOTE — PROGRESS NOTE ADULT - ASSESSMENT
NEURO  - Sedation/Paralytics      - Fentanyl 2, Propofol 20    - Pain Control    - Fentanyl 2    - Continue to monitor neurologic status     RESPIRATORY  - Acute hypoxic respiratory failure d/t +COVID-19     - Oxygen Therapy: AC      - Ventilator Settings: FiO2 100%, PEEP 15, RR 24,      - Most recent AB.44/38/61/75     - Secretions: no    - Daily CXR     - Continue ventilator weaning as tolerated     CARDIOVASCULAR  - Rhythm: NSR  - Daily EKG     - QTc: 473    - Hemodynamic support:      - Gtts: NEpi 0.04    - Triglycerides     - Pending     - Dysthrhythmias:      - None     GI  - Nutrition:      - Feeding modality: NPO for now due to coffee ground contents in stomach      - Tube Feeds -     - Type:       Goal Rate:       - Bowel Regimen      - Miralax, Senna      - Not moving bowels     - GI Prophylaxis:      - Pepcid     /RENAL  - + Villa      - Fluid Balance: -700     - UOP:     - BUN/Cr: 23/1.05     - Trend daily     - Monitor electrolytes     - Correct K<4, Mg< 2    HEME  - H/H:     - DVT Prophylaxis      - Lovenox      - +SCDs     ID  - COVID-19 Therapy      - Date of intubation: 4/3     - Date of extubation: x     - Current O2 therapy: Vent /380/24/15     - Position: supine      - Proning: No          Date of last proning:      - Temperature: afebrile      - Azithromycin start date:    4/3                  End date:      - Plaquenil start date:      4/3                       End date:      - Tociluzamab:  Yes                           Date: 4/3     - Steroids: Solumedrol 40 IV x 12    - Access/Lines: L axillary AL, RIJ CVC      - Date placed: 4/3    - Sputum Culture : n/a     - Abx:     - Blood Culture: n/a     - Abx     ENDO   - Hx DM: No     - Current therapy: ISS      DISPOSITION:   - Code Status: Full Code  - Continue current ICU care

## 2020-04-03 NOTE — CONSULT NOTE ADULT - PROBLEM SELECTOR RECOMMENDATION 9
his condition deteriorated and he was intubated and is due to the covid pneumonia with increase in capillary permeability and leakage.  Continue MVV, sedation, and management as per CCM. Peak pressure is 25

## 2020-04-03 NOTE — DIETITIAN INITIAL EVALUATION ADULT. - PROBLEM SELECTOR PLAN 3
Presented febrile 100.8, tachypnea 38 and desaturation to 86-88%, lactate 3.3, wbc count 14k. S/p 750cc normal saline in the ED.  - COVID 19 positive  - f/u RVP   - f/u blood cultures  - holding home Lasix 40mg, HCTZ 12.5mg, Imdur 30mg, Metoprolol 100mg in the setting of sepsis presentation. Restart as tolerated.

## 2020-04-03 NOTE — DIETITIAN INITIAL EVALUATION ADULT. - PROBLEM SELECTOR PLAN 6
Patient with history of HTN, currently normo/hypotensive with adequate MAPs  - c/w Losartan 100mg daily, and Ranolazine 500mg BID  - on home Lasix 40mg, HCTZ 12.5mg, Imdur 30mg, Metoprolol 100mg, holding in the setting of sepsis presentation. Restart as tolerated.

## 2020-04-03 NOTE — CONSULT NOTE ADULT - SUBJECTIVE AND OBJECTIVE BOX
Patient is a 75y old  Male who presents with a chief complaint of shortness of breath (03 Apr 2020 14:42)      HPI:  75M PMH of COPD (no history of exacerbations or intubations), CAD s/p multiple stents, hypertension who presents due to shortness of breath, cough, and fever for 1 week. Patient follows with Dr. Mera as an outpatient and completed 5 days of azithromycin and cefdinir, last dose 2 days ago and his shortness of breath worsened.    ED:   Vitals: T 100.8 /84  RR 38 O2 86% on 4L NC  Labs: Wbc 13.87 Lymphopenic 0.61 Hb 12.8 Plt 238 Na 139 K 3.0 Cl 86 Anion gap 21 BUN/Cr 25/1.22 AST/ALT 62/97 CRP 32.07 Ferritin 1214  Procal 0.35 Lactate 3.4  D-dimer 542, COVID positive  EKG: sinus tachycardia, no acute ischemic changes,   Chest XRAY: bilateral patchy consolidations  Interventions: Azithromycin 500mg IVx1, Zosyn 3.375gx1, Solumedrol 125mg IVx1, Tylenol 650mg x1, Mg Sulfate 2g, Potassium chloride 54lmoa4, 750cc normal saline (02 Apr 2020 23:15)      PAST MEDICAL & SURGICAL HISTORY:  Essential hypertension: Hypertension  Transient ischemic attack (TIA), and cerebral infarction without residual deficits: History of CVA (cerebrovascular accident) without residual deficits  Hyperlipidemia: Hyperlipidemia  Benign prostatic hypertrophy without lower urinary tract symptoms: BPH (benign prostatic hypertrophy)  Chronic obstructive pulmonary disease: COPD (chronic obstructive pulmonary disease)  Atherosclerosis of coronary artery: Coronary artery disease  Disease of pericardium: Pericardial effusion without cardiac tamponade  Constipation: Constipation  H/O heart artery stent  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  Other postprocedural status: Cath @ [facility] 5/19/14: 80% pLAD, 70% oD1, widely patent stents in mLCx, RPDA &amp; RPLS. Successful PTCA KARAN pLAD &amp; PTCA ostial D1.      FAMILY HISTORY:  No pertinent family history in first degree relatives      SOCIAL HISTORY:  Smoking Status: [ ] Current, [ x] Former, [ ] Never  Pack Years:    MEDICATIONS:  Pulmonary:  ALBUTerol    90 MICROgram(s) HFA Inhaler 2 Puff(s) Inhalation every 4 hours PRN    Antimicrobials:  azithromycin   Tablet 250 milliGRAM(s) Oral every 24 hours  hydroxychloroquine 200 milliGRAM(s) Oral every 12 hours  piperacillin/tazobactam IVPB.. 3.375 Gram(s) IV Intermittent every 8 hours  vancomycin  IVPB 1000 milliGRAM(s) IV Intermittent every 12 hours    Anticoagulants:  aspirin enteric coated 81 milliGRAM(s) Oral daily  clopidogrel Tablet 75 milliGRAM(s) Oral daily  enoxaparin Injectable 40 milliGRAM(s) SubCutaneous every 24 hours    Onc:    GI/:  famotidine Injectable 20 milliGRAM(s) IV Push every 12 hours  polyethylene glycol 3350 17 Gram(s) Oral daily  senna 2 Tablet(s) Oral at bedtime    Endocrine:  dextrose 40% Gel 15 Gram(s) Oral once PRN  dextrose 50% Injectable 12.5 Gram(s) IV Push once  dextrose 50% Injectable 25 Gram(s) IV Push once  dextrose 50% Injectable 25 Gram(s) IV Push once  glucagon  Injectable 1 milliGRAM(s) IntraMuscular once PRN  insulin lispro (HumaLOG) corrective regimen sliding scale   SubCutaneous every 6 hours  methylPREDNISolone sodium succinate Injectable 40 milliGRAM(s) IV Push two times a day    Cardiac:  norepinephrine Infusion 0.05 MICROgram(s)/kG/Min IV Continuous <Continuous>    Other Medications:  acetaminophen  IVPB .. 1000 milliGRAM(s) IV Intermittent every 6 hours PRN  chlorhexidine 0.12% Liquid 15 milliLiter(s) Oral Mucosa every 12 hours  dextrose 5%. 1000 milliLiter(s) IV Continuous <Continuous>  fentaNYL   Infusion. 0.5 MICROgram(s)/kG/Hr IV Continuous <Continuous>  potassium chloride    Tablet ER 40 milliEquivalent(s) Oral every 2 hours  propofol Infusion 33.333 MICROgram(s)/kG/Min IV Continuous <Continuous>      Allergies    sulfa drugs (Unknown)  sulfADIAZINE (Rash)    Intolerances        Review of Systems:   •	General: negative  •	Skin/Breast: negative  •	Ophthalmologic: negative  •	ENMT: negative  •	Respiratory and Thorax: negative  •	Cardiovascular: negative  •	Gastrointestinal: negative  •	Genitourinary: negative  •	Musculoskeletal: negative  •	Neurological: sedated  •	Psychiatric: negative  •	Hematology/Lymphatics: negative  •	Endocrine: negative  •	Allergic/Immunologic: negative    Physical Exam:   • Constitutional:	Well-developed, well nourished  • Eyes:	EOMI; PERRL; no drainage or redness  • ENMT:	No oral lesions; no gross abnormalities  • Neck	No bruits; no thyromegaly or nodules  • Breasts:	not examined  • Back:	No deformity or limitation of movement  • Respiratory:	Breath Sounds equal & clear to percussion & BL ralesauscultation, no accessory muscle use  • Cardiovascular:	Regular rate & rhythm, normal S1, S2; no murmurs, gallops or rubs; no S3, S4  • Gastrointestinal:	Soft, non-tender, no hepatosplenomegaly, normal bowel sounds  • Genitourinary:	not examined  • Rectal: not examined  • Extremities:	No cyanosis, clubbing or edema  • Vascular:	Equal and normal pulses (carotid, femoral, dorsalis pedis)  • Neurologica:l	not examined  • Skin:	No lesions; no rash  • Lymph Nodes:	No lymphadedenopathy  • Musculoskeletal:	No joint pain, swelling or deformity; no limitation of movement      Vital Signs Last 24 Hrs  T(C): 36.6 (03 Apr 2020 00:50), Max: 38.2 (02 Apr 2020 21:08)  T(F): 97.8 (03 Apr 2020 00:50), Max: 100.7 (02 Apr 2020 21:08)  HR: 61 (03 Apr 2020 16:00) (61 - 113)  BP: 109/91 (03 Apr 2020 05:45) (109/91 - 170/74)  BP(mean): 99 (03 Apr 2020 05:45) (99 - 99)  RR: 19 (03 Apr 2020 16:00) (19 - 31)  SpO2: 92% (03 Apr 2020 16:00) (81% - 96%)    04-02 @ 07:01  -  04-03 @ 07:00  --------------------------------------------------------  IN: 839 mL / OUT: 850 mL / NET: -11 mL    04-03 @ 07:01  -  04-03 @ 17:26  --------------------------------------------------------  IN: 891.1 mL / OUT: 1305 mL / NET: -413.9 mL      Mode: AC/ CMV (Assist Control/ Continuous Mandatory Ventilation)  RR (machine): 24  TV (machine): 380  FiO2: 80  PEEP: 12  ITime: 1  MAP: 18  PIP: 23      LABS:  ABG - ( 03 Apr 2020 13:15 )  pH, Arterial: 7.44  pH, Blood: x     /  pCO2: 38    /  pO2: 61    / HCO3: 25    / Base Excess: 1.3   /  SaO2: 92                  CBC Full  -  ( 03 Apr 2020 13:12 )  WBC Count : 18.29 K/uL  RBC Count : 3.69 M/uL  Hemoglobin : 11.3 g/dL  Hematocrit : 32.2 %  Platelet Count - Automated : 219 K/uL  Mean Cell Volume : 87.3 fl  Mean Cell Hemoglobin : 30.6 pg  Mean Cell Hemoglobin Concentration : 35.1 gm/dL  Auto Neutrophil # : 16.35 K/uL  Auto Lymphocyte # : 0.64 K/uL  Auto Monocyte # : 1.13 K/uL  Auto Eosinophil # : 0.00 K/uL  Auto Basophil # : 0.00 K/uL  Auto Neutrophil % : 89.4 %  Auto Lymphocyte % : 3.5 %  Auto Monocyte % : 6.2 %  Auto Eosinophil % : 0.0 %  Auto Basophil % : 0.0 %    04-03    134<L>  |  97  |  23  ----------------------------<  228<H>  3.4<L>   |  24  |  1.05    Ca    8.2<L>      03 Apr 2020 13:12  Phos  3.7     04-03  Mg     2.4     04-03    TPro  6.3  /  Alb  2.9<L>  /  TBili  0.4  /  DBili  x   /  AST  61<H>  /  ALT  39  /  AlkPhos  57  04-03    PT/INR - ( 03 Apr 2020 13:12 )   PT: 14.6 sec;   INR: 1.27          PTT - ( 03 Apr 2020 13:12 )  PTT:35.4 sec      < from: Xray Chest 1 View-PORTABLE IMMEDIATE (04.03.20 @ 12:41) >    EXAM:  XR CHEST PORTABLE IMMED 1V                          PROCEDURE DATE:  04/03/2020          INTERPRETATION:  HISTORY: ; ET tube withdrawal; ET Tube withdrawal;  TECHNIQUE: Portable frontal view of the chest, 1 view.  COMPARISON: none.  FINDINGS:   Endotracheal tube terminates at the origin of the right mainstem bronchus and should be retracted. Right internal jugular central venous catheter tip overlies the lower SVC.  HEART: normal in size   LUNGS: Bilateral airspace opacities, similar to prior    OSSEOUS STRUCTURES:: degenerative changes  . Enteric tube courses below the diaphragm.  IMPRESSION:   Endotracheal tube continues to be malpositioned, terminating at the origin of the right mainstem bronchus and should be retracted.      < end of copied text >              RADIOLOGY & ADDITIONAL STUDIES (The following images were personally reviewed):

## 2020-04-03 NOTE — PROVIDER CONTACT NOTE (CHANGE IN STATUS NOTIFICATION) - ACTION/TREATMENT ORDERED:
Patient placed on bipap, stanley for strict I&O, fleet enema for abdominal distention and constipation, Broad spectrum antibiotics, IV bolus 250ml.

## 2020-04-03 NOTE — PROGRESS NOTE ADULT - ASSESSMENT
75M PMH of COPD (no history of exacerbations or intubations), CAD s/p multiple stents, hypertension who presents due to shortness of breath, cough, and fever for 1 week. Patient follows with Dr. Mera as an outpatient and completed 5 days of azithromycin and cefdinir, last dose 2 days ago and his shortness of breath worsened. On BIPAP, increasing abdominal distention, intubated for hypoxia on 4/3. Monitor abdominal distention and lactate.       Neuro: Propofol Fentanyl,Tylenol   CV: Hypotension on Levo for MAP >65  Pulm: ARDS 2* Covid  Intubated on 100/380/24/12  GI: Abdominal distention NPO, Nasogastric sump to continuous suction, Ascorbic acid (4-3, Thiamine (4/3-  : Villa   ID:Plaquenil ( 4/3-) azithromycin (completed course at home) , vanco 4/3- zosyn 4/3-HAP   Endo: ISS  PPx: Lovenox  Lines: RT IJ TLC 4/3(-) L axillary Baylee 4/3(-)

## 2020-04-03 NOTE — PROVIDER CONTACT NOTE (CHANGE IN STATUS NOTIFICATION) - BACKGROUND
Patient A&Ox4 admitted for hypoxia related to COVID and COPD exacerbation. Patient placed on bipap following episode of respiratory distress.

## 2020-04-03 NOTE — PROVIDER CONTACT NOTE (CHANGE IN STATUS NOTIFICATION) - ASSESSMENT
/66. HR 92, RR 30s, O2 sat 79-82% on 15l nonrebreather. Patient with accessory muscle use, Abdominal distended, unable to deep breathe. Audible stridor and rhonchi. Patient lethargic and agitated.

## 2020-04-03 NOTE — DIETITIAN INITIAL EVALUATION ADULT. - ADD RECOMMEND
1. Please see EN recs above *discussed w/team 2. Monitor lytes and replete prn. 3. Pain and bowel regimens per team 4. Cont. w/micronutrient supplementation

## 2020-04-03 NOTE — DIETITIAN INITIAL EVALUATION ADULT. - PROBLEM SELECTOR PLAN 1
Presenting with subjective fevers for 7 days, fatigue, nonproductive cough, and progressive worsening of shortness of breath admitted with acute hypoxic respiratory failure, admitted with hypoxic respiratory distress requiring supplemental oxygen. Crackles in the BL lung bases, decreased air entry bilaterally, and expiratory wheezes. In the ED, tachypneic to 38 and desaturation to 86-88% on 6L NC, placed on nonrebreather with improvement in oxygenation to 93% RR down to 30. Received solumedrol 125mg IVPx1. Chest Xray showing bilateral patchy consolidations likely secondary to COVID. Currently, 93% on NRB.  - monitor respiratory status  - COVID positive

## 2020-04-03 NOTE — DIETITIAN INITIAL EVALUATION ADULT. - PROBLEM SELECTOR PLAN 2
Presenting with fever, nonproductive cough, and shortness of breath x7 days, admitted with hypoxic respiratory distress requiring supplemental oxygen. Chest Xray showing bilateral patchy consolidations. Currently, 93% on NRB.  - thiamine 200mg IV BID  - ascorbic acid 1.5mg IV q6 hours  - Plaquenil 400mg BID for 2 doses and 200mg BID for 8 doses  - completed 5 days of azithromycin as an outpatient  - f/u AM EKG for QTC, qtc on 4/2: 402  - daily COVID labs

## 2020-04-03 NOTE — PROGRESS NOTE ADULT - SUBJECTIVE AND OBJECTIVE BOX
Patient discussed on rounds with: Dr. Butler     Primary Diagnosis: Acute hypoxic respiratory failure due to COVID-19    SUBJECTIVE:   74y/o male brought to [facility] ICU overnight after intubation.  Pt intubated/sedated.  Sedation: Fentanyl 2, Propofol 20.  Hemodynamic support: NEpi 0.04.  NGT placed overnight for distended abdomen.  200mL coffee ground output evacuated.  NGT continues to lower intermittent suction.    Pt afebrile.      Interval events:   Evaluation by Dr. Mera.  Start Azithro/Plaquenil.  Give 1 dose Toci.  Start steroids   Abd X-ray with ileus.      OBJECTIVE:    T(C): 36.6 (20 @ 00:50), Max: 38.2 (20 @ 21:08)  HR: 64 (20 @ 14:00) (64 - 113)  BP: 109/91 (20 @ 05:45) (109/91 - 170/74)  RR: 19 (20 @ 14:00) (19 - 31)  SpO2: 95% (20 @ 14:00) (81% - 96%)      20 @ 07:01  -  20 @ 07:00  --------------------------------------------------------  IN: 839 mL / OUT: 850 mL / NET: -11 mL    20 @ 07:01  -  20 @ 14:42  --------------------------------------------------------  IN: 284.6 mL / OUT: 925 mL / NET: -640.4 mL        VENTILATOR SETTINGS:  Mode: AC  Settings: 100%, PEEP 15, RR 24,     AB.44/38/61/25    PHYSICAL EXAM:  Physical Exam performed by Intensivist to minimize risk of exposure.  Please refer to Attending Attestation for comprehensive exam    GENERAL: intubated/sedated   NEURO: intubated/sedated   RESP: coarse breath sounds bilaterally   CV: RRR  GI: abdomen distended, soft, hypoactive BS x 4 quads  : + Villa  PSYCH: sedated    LABS:                        11.3   18.29 )-----------( 219      ( 2020 13:12 )             32.2     04-03    134<L>  |  97  |  23  ----------------------------<  228<H>  3.4<L>   |  24  |  1.05    Ca    8.2<L>      2020 13:12  Phos  3.7     04-03  Mg     2.4     04-03    TPro  6.3  /  Alb  2.9<L>  /  TBili  0.4  /  DBili  x   /  AST  61<H>  /  ALT  39  /  AlkPhos  57  04-03    CAPILLARY BLOOD GLUCOSE        CARDIAC MARKERS ( 2020 20:59 )  x     / <0.01 ng/mL / 112 U/L / x     / 2.1 ng/mL      LIVER FUNCTIONS - ( 2020 13:12 )  Alb: 2.9 g/dL / Pro: 6.3 g/dL / ALK PHOS: 57 U/L / ALT: 39 U/L / AST: 61 U/L / GGT: x           PT/INR - ( 2020 13:12 )   PT: 14.6 sec;   INR: 1.27          PTT - ( 2020 13:12 )  PTT:35.4 sec    MEDICATIONS:  MEDICATIONS  (STANDING):  ascorbic acid IVPB 1500 milliGRAM(s) IV Intermittent <User Schedule>  aspirin enteric coated 81 milliGRAM(s) Oral daily  azithromycin   Tablet 250 milliGRAM(s) Oral every 24 hours  chlorhexidine 0.12% Liquid 15 milliLiter(s) Oral Mucosa every 12 hours  clopidogrel Tablet 75 milliGRAM(s) Oral daily  dextrose 5%. 1000 milliLiter(s) (50 mL/Hr) IV Continuous <Continuous>  dextrose 50% Injectable 12.5 Gram(s) IV Push once  dextrose 50% Injectable 25 Gram(s) IV Push once  dextrose 50% Injectable 25 Gram(s) IV Push once  enoxaparin Injectable 40 milliGRAM(s) SubCutaneous every 24 hours  famotidine Injectable 20 milliGRAM(s) IV Push every 12 hours  fentaNYL   Infusion. 0.5 MICROgram(s)/kG/Hr (3 mL/Hr) IV Continuous <Continuous>  hydroxychloroquine 200 milliGRAM(s) Oral every 12 hours  insulin lispro (HumaLOG) corrective regimen sliding scale   SubCutaneous every 6 hours  methylPREDNISolone sodium succinate Injectable 40 milliGRAM(s) IV Push two times a day  norepinephrine Infusion 0.05 MICROgram(s)/kG/Min (5.63 mL/Hr) IV Continuous <Continuous>  piperacillin/tazobactam IVPB.. 3.375 Gram(s) IV Intermittent every 8 hours  propofol Infusion 33.333 MICROgram(s)/kG/Min (12 mL/Hr) IV Continuous <Continuous>  thiamine 100 milliGRAM(s) Oral <User Schedule>  vancomycin  IVPB 1000 milliGRAM(s) IV Intermittent every 12 hours    MEDICATIONS  (PRN):  acetaminophen  IVPB .. 1000 milliGRAM(s) IV Intermittent every 6 hours PRN Temp greater or equal to 38C (100.4F)  ALBUTerol    90 MICROgram(s) HFA Inhaler 2 Puff(s) Inhalation every 4 hours PRN Shortness of Breath and/or Wheezing  dextrose 40% Gel 15 Gram(s) Oral once PRN Blood Glucose LESS THAN 70 milliGRAM(s)/deciliter  glucagon  Injectable 1 milliGRAM(s) IntraMuscular once PRN Glucose LESS THAN 70 milligrams/deciliter    MEDICATIONS  (PRN):  acetaminophen  IVPB .. 1000 milliGRAM(s) IV Intermittent every 6 hours PRN Temp greater or equal to 38C (100.4F)  ALBUTerol    90 MICROgram(s) HFA Inhaler 2 Puff(s) Inhalation every 4 hours PRN Shortness of Breath and/or Wheezing  dextrose 40% Gel 15 Gram(s) Oral once PRN Blood Glucose LESS THAN 70 milliGRAM(s)/deciliter  glucagon  Injectable 1 milliGRAM(s) IntraMuscular once PRN Glucose LESS THAN 70 milligrams/deciliter

## 2020-04-03 NOTE — DIETITIAN INITIAL EVALUATION ADULT. - OTHER INFO
76 yo/male with PMHx COPD, HTN, CAD, presented w/SOB, cough, fever x 1 week. Septic likely 2/2 COVID and underlying bacterial pna. Pt initially on tele-unit but became tachypneic and distended while on BiPAP and was ultimately intubated for airway protection. Pt discussed w/covering team and RN. Unable to conduct a face to face interview or nutrition-focused physical exam due to limited contact restrictions related to the pt's medical condition and isolation precautions. Pt is intubated on VC/AC mode, sedated on propofol @ 9mL/hr (238kcal/day from lipids), and fentanyl. MAP 76- on levophed for BP support. NPO w/NGT to LIWS- inserted overnight w/return of coffee-ground emesis. AXR showing no obstruction but c/f ileus. Unlikely to replace w/dobhoff for feeds today per team. BM on 4/3 per chart. NKFA. Skin intact pressure-wise. Will continue to follow per RD protocol.

## 2020-04-03 NOTE — PROGRESS NOTE ADULT - SUBJECTIVE AND OBJECTIVE BOX
75M PMH of COPD (no history of exacerbations or intubations), CAD s/p multiple stents, hypertension who presents due to shortness of breath, cough, and fever for 1 week admitted with acute hypoxic respiratory failure secondary to COVID-19 and suspected underlying bacterial pneumonia. Patient was started on vanc and zosyn in addition to Plaquenil. He completed a 5 day course of azithromycin as an outpatient. On admission, patient initially required NRB and was tachypneic to 30, satting at 93% with elevated lactate in the setting of abdominal distention. CXR showed extensive bilateral interstitial opacities. Abdomen was distended after BIPAP. On reevaluation, given persistently elevated lactate and increased work of breathing despite BIPAP, patient was subsequently intubated and transferred to ICU (2 URIS) for further monitoring.      General: Sedated/ intubated  Neurology: Sedated  Eyes: Scleras clear  Respiratory: Intubated on VC//12/380/24  CV: Rate and rhythm regular  Abdominal: Soft, distended, NGT to LIS  Extremities: No peripheral edema  Lines: Lt axillary A-line 4/3-, Rt IJ TLC 4/3-    ICU Vital Signs Last 24 Hrs  T(C): 36.6 (03 Apr 2020 00:50), Max: 38.2 (02 Apr 2020 21:08)  T(F): 97.8 (03 Apr 2020 00:50), Max: 100.7 (02 Apr 2020 21:08)  HR: 92 (03 Apr 2020 05:45) (80 - 113)  BP: 109/91 (03 Apr 2020 05:45) (109/91 - 170/74)  BP(mean): 99 (03 Apr 2020 05:45) (99 - 99)  ABP: --  ABP(mean): --  RR: 31 (03 Apr 2020 05:45) (20 - 31)  SpO2: 96% (03 Apr 2020 05:45) (81% - 96%)      LABS/RADIOLOGY RESULTS:                          11.6   16.12 )-----------( 220      ( 03 Apr 2020 06:37 )             32.8   04-03    128<L>  |  87<L>  |  27<H>  ----------------------------<  208<H>  3.2<L>   |  23  |  1.22    Ca    8.9      03 Apr 2020 00:21    TPro  7.6  /  Alb  3.6  /  TBili  0.7  /  DBili  x   /  AST  62<H>  /  ALT  47<H>  /  AlkPhos  69  04-02  Blood Cultures

## 2020-04-03 NOTE — DIETITIAN INITIAL EVALUATION ADULT. - ENTERAL
If able to replace sump w/dobhoff and initiate trickle EN, recommend starting Vital 1.5 Kristopher @ 20mL/hr x 24hrs via NGT. Will provide goal rate pending tolerance of trickle feeds. Monitor for s/s intolerance; maintain aspiration precautions at all times

## 2020-04-03 NOTE — DIETITIAN INITIAL EVALUATION ADULT. - PERTINENT MEDS FT
Aspirin, plavix, azithromycin, lovenox, plaquenil, vanco/zosyn, ascorbic acid, thiamine, pepcid, fentanyl, miralax, senna

## 2020-04-03 NOTE — DIETITIAN INITIAL EVALUATION ADULT. - ENERGY NEEDS
Height 62"; ABW 60kg; IBW 53.6kg; 112%IBW  BMI 24.2  IBW used to estimate needs 2/2 vent. needs estimated for age and adjusted for vent, COVID infection. fluids per team 2/2 respiratory distress

## 2020-04-03 NOTE — DIETITIAN INITIAL EVALUATION ADULT. - PROBLEM SELECTOR PLAN 5
Patient with history of CAD s/p multiple stents and CABG in 2014   - c/w Aspirin, Plavix, Atorvastatin  - c/w Losartan 100mg daily, and Ranolazine 500mg BID  - on home Lasix 40mg, HCTZ 12.5mg, Imdur 30mg, Metoprolol 100mg, holding in the setting of sepsis presentation. Restart as tolerated.  - f/u AM EKG

## 2020-04-04 LAB
ALBUMIN SERPL ELPH-MCNC: 2.6 G/DL — LOW (ref 3.3–5)
ALBUMIN SERPL ELPH-MCNC: 2.7 G/DL — LOW (ref 3.3–5)
ALP SERPL-CCNC: 56 U/L — SIGNIFICANT CHANGE UP (ref 40–120)
ALP SERPL-CCNC: 73 U/L — SIGNIFICANT CHANGE UP (ref 40–120)
ALT FLD-CCNC: 45 U/L — SIGNIFICANT CHANGE UP (ref 10–45)
ALT FLD-CCNC: 64 U/L — HIGH (ref 10–45)
ANION GAP SERPL CALC-SCNC: 10 MMOL/L — SIGNIFICANT CHANGE UP (ref 5–17)
ANION GAP SERPL CALC-SCNC: 14 MMOL/L — SIGNIFICANT CHANGE UP (ref 5–17)
APTT BLD: 30.7 SEC — SIGNIFICANT CHANGE UP (ref 27.5–36.3)
AST SERPL-CCNC: 63 U/L — HIGH (ref 10–40)
AST SERPL-CCNC: 79 U/L — HIGH (ref 10–40)
BASE EXCESS BLDA CALC-SCNC: -0.1 MMOL/L — SIGNIFICANT CHANGE UP (ref -2–3)
BASOPHILS # BLD AUTO: 0 K/UL — SIGNIFICANT CHANGE UP (ref 0–0.2)
BASOPHILS # BLD AUTO: 0 K/UL — SIGNIFICANT CHANGE UP (ref 0–0.2)
BASOPHILS NFR BLD AUTO: 0 % — SIGNIFICANT CHANGE UP (ref 0–2)
BASOPHILS NFR BLD AUTO: 0 % — SIGNIFICANT CHANGE UP (ref 0–2)
BILIRUB SERPL-MCNC: 0.4 MG/DL — SIGNIFICANT CHANGE UP (ref 0.2–1.2)
BILIRUB SERPL-MCNC: 0.5 MG/DL — SIGNIFICANT CHANGE UP (ref 0.2–1.2)
BUN SERPL-MCNC: 27 MG/DL — HIGH (ref 7–23)
BUN SERPL-MCNC: 29 MG/DL — HIGH (ref 7–23)
CALCIUM SERPL-MCNC: 8 MG/DL — LOW (ref 8.4–10.5)
CALCIUM SERPL-MCNC: 8.2 MG/DL — LOW (ref 8.4–10.5)
CHLORIDE SERPL-SCNC: 102 MMOL/L — SIGNIFICANT CHANGE UP (ref 96–108)
CHLORIDE SERPL-SCNC: 102 MMOL/L — SIGNIFICANT CHANGE UP (ref 96–108)
CK SERPL-CCNC: 100 U/L — SIGNIFICANT CHANGE UP (ref 30–200)
CO2 SERPL-SCNC: 23 MMOL/L — SIGNIFICANT CHANGE UP (ref 22–31)
CO2 SERPL-SCNC: 23 MMOL/L — SIGNIFICANT CHANGE UP (ref 22–31)
CREAT SERPL-MCNC: 1.03 MG/DL — SIGNIFICANT CHANGE UP (ref 0.5–1.3)
CREAT SERPL-MCNC: 1.09 MG/DL — SIGNIFICANT CHANGE UP (ref 0.5–1.3)
D DIMER BLD IA.RAPID-MCNC: 570 NG/ML DDU — HIGH
EOSINOPHIL # BLD AUTO: 0 K/UL — SIGNIFICANT CHANGE UP (ref 0–0.5)
EOSINOPHIL # BLD AUTO: 0 K/UL — SIGNIFICANT CHANGE UP (ref 0–0.5)
EOSINOPHIL NFR BLD AUTO: 0 % — SIGNIFICANT CHANGE UP (ref 0–6)
EOSINOPHIL NFR BLD AUTO: 0 % — SIGNIFICANT CHANGE UP (ref 0–6)
ERYTHROCYTE [SEDIMENTATION RATE] IN BLOOD: 120 MM/HR — HIGH
FERRITIN SERPL-MCNC: 1641 NG/ML — HIGH (ref 30–400)
GAS PNL BLDA: SIGNIFICANT CHANGE UP
GLUCOSE SERPL-MCNC: 148 MG/DL — HIGH (ref 70–99)
GLUCOSE SERPL-MCNC: 182 MG/DL — HIGH (ref 70–99)
HCO3 BLDA-SCNC: 24 MMOL/L — SIGNIFICANT CHANGE UP (ref 21–28)
HCT VFR BLD CALC: 30.7 % — LOW (ref 39–50)
HCT VFR BLD CALC: 32.7 % — LOW (ref 39–50)
HGB BLD-MCNC: 10.8 G/DL — LOW (ref 13–17)
HGB BLD-MCNC: 11.3 G/DL — LOW (ref 13–17)
LACTATE SERPL-SCNC: 1.5 MMOL/L — SIGNIFICANT CHANGE UP (ref 0.5–2)
LDH SERPL L TO P-CCNC: 535 U/L — HIGH (ref 50–242)
LYMPHOCYTES # BLD AUTO: 0.24 K/UL — LOW (ref 1–3.3)
LYMPHOCYTES # BLD AUTO: 0.41 K/UL — LOW (ref 1–3.3)
LYMPHOCYTES # BLD AUTO: 1 % — LOW (ref 13–44)
LYMPHOCYTES # BLD AUTO: 1.7 % — LOW (ref 13–44)
MAGNESIUM SERPL-MCNC: 2.7 MG/DL — HIGH (ref 1.6–2.6)
MAGNESIUM SERPL-MCNC: 2.7 MG/DL — HIGH (ref 1.6–2.6)
MCHC RBC-ENTMCNC: 30.5 PG — SIGNIFICANT CHANGE UP (ref 27–34)
MCHC RBC-ENTMCNC: 31 PG — SIGNIFICANT CHANGE UP (ref 27–34)
MCHC RBC-ENTMCNC: 34.6 GM/DL — SIGNIFICANT CHANGE UP (ref 32–36)
MCHC RBC-ENTMCNC: 35.2 GM/DL — SIGNIFICANT CHANGE UP (ref 32–36)
MCV RBC AUTO: 86.7 FL — SIGNIFICANT CHANGE UP (ref 80–100)
MCV RBC AUTO: 89.6 FL — SIGNIFICANT CHANGE UP (ref 80–100)
MONOCYTES # BLD AUTO: 1.05 K/UL — HIGH (ref 0–0.9)
MONOCYTES # BLD AUTO: 1.46 K/UL — HIGH (ref 0–0.9)
MONOCYTES NFR BLD AUTO: 4.4 % — SIGNIFICANT CHANGE UP (ref 2–14)
MONOCYTES NFR BLD AUTO: 6 % — SIGNIFICANT CHANGE UP (ref 2–14)
NEUTROPHILS # BLD AUTO: 22.43 K/UL — HIGH (ref 1.8–7.4)
NEUTROPHILS # BLD AUTO: 22.64 K/UL — HIGH (ref 1.8–7.4)
NEUTROPHILS NFR BLD AUTO: 88.6 % — HIGH (ref 43–77)
NEUTROPHILS NFR BLD AUTO: 93 % — HIGH (ref 43–77)
PCO2 BLDA: 38 MMHG — SIGNIFICANT CHANGE UP (ref 35–48)
PH BLDA: 7.42 — SIGNIFICANT CHANGE UP (ref 7.35–7.45)
PHOSPHATE SERPL-MCNC: 2.1 MG/DL — LOW (ref 2.5–4.5)
PHOSPHATE SERPL-MCNC: 3.3 MG/DL — SIGNIFICANT CHANGE UP (ref 2.5–4.5)
PLATELET # BLD AUTO: 219 K/UL — SIGNIFICANT CHANGE UP (ref 150–400)
PLATELET # BLD AUTO: 251 K/UL — SIGNIFICANT CHANGE UP (ref 150–400)
PO2 BLDA: 64 MMHG — LOW (ref 83–108)
POTASSIUM SERPL-MCNC: 3.9 MMOL/L — SIGNIFICANT CHANGE UP (ref 3.5–5.3)
POTASSIUM SERPL-MCNC: 4.1 MMOL/L — SIGNIFICANT CHANGE UP (ref 3.5–5.3)
POTASSIUM SERPL-SCNC: 3.9 MMOL/L — SIGNIFICANT CHANGE UP (ref 3.5–5.3)
POTASSIUM SERPL-SCNC: 4.1 MMOL/L — SIGNIFICANT CHANGE UP (ref 3.5–5.3)
PROT SERPL-MCNC: 6.1 G/DL — SIGNIFICANT CHANGE UP (ref 6–8.3)
PROT SERPL-MCNC: 6.3 G/DL — SIGNIFICANT CHANGE UP (ref 6–8.3)
RBC # BLD: 3.54 M/UL — LOW (ref 4.2–5.8)
RBC # BLD: 3.65 M/UL — LOW (ref 4.2–5.8)
RBC # FLD: 13.1 % — SIGNIFICANT CHANGE UP (ref 10.3–14.5)
RBC # FLD: 13.7 % — SIGNIFICANT CHANGE UP (ref 10.3–14.5)
SAO2 % BLDA: 93 % — LOW (ref 95–100)
SODIUM SERPL-SCNC: 135 MMOL/L — SIGNIFICANT CHANGE UP (ref 135–145)
SODIUM SERPL-SCNC: 139 MMOL/L — SIGNIFICANT CHANGE UP (ref 135–145)
TROPONIN T SERPL-MCNC: <0.01 NG/ML — SIGNIFICANT CHANGE UP (ref 0–0.01)
WBC # BLD: 23.89 K/UL — HIGH (ref 3.8–10.5)
WBC # BLD: 24.34 K/UL — HIGH (ref 3.8–10.5)
WBC # FLD AUTO: 23.89 K/UL — HIGH (ref 3.8–10.5)
WBC # FLD AUTO: 24.34 K/UL — HIGH (ref 3.8–10.5)

## 2020-04-04 PROCEDURE — 71045 X-RAY EXAM CHEST 1 VIEW: CPT | Mod: 26

## 2020-04-04 RX ORDER — LACTULOSE 10 G/15ML
20 SOLUTION ORAL
Refills: 0 | Status: DISCONTINUED | OUTPATIENT
Start: 2020-04-04 | End: 2020-04-05

## 2020-04-04 RX ORDER — CISATRACURIUM BESYLATE 2 MG/ML
10 INJECTION INTRAVENOUS ONCE
Refills: 0 | Status: COMPLETED | OUTPATIENT
Start: 2020-04-04 | End: 2020-04-04

## 2020-04-04 RX ORDER — CISATRACURIUM BESYLATE 2 MG/ML
3 INJECTION INTRAVENOUS
Qty: 200 | Refills: 0 | Status: DISCONTINUED | OUTPATIENT
Start: 2020-04-04 | End: 2020-04-08

## 2020-04-04 RX ADMIN — Medication 40 MILLIGRAM(S): at 06:24

## 2020-04-04 RX ADMIN — FAMOTIDINE 20 MILLIGRAM(S): 10 INJECTION INTRAVENOUS at 06:25

## 2020-04-04 RX ADMIN — Medication 200 MILLIGRAM(S): at 10:33

## 2020-04-04 RX ADMIN — Medication 85 MILLIMOLE(S): at 08:30

## 2020-04-04 RX ADMIN — ENOXAPARIN SODIUM 40 MILLIGRAM(S): 100 INJECTION SUBCUTANEOUS at 06:23

## 2020-04-04 RX ADMIN — CHLORHEXIDINE GLUCONATE 15 MILLILITER(S): 213 SOLUTION TOPICAL at 06:56

## 2020-04-04 RX ADMIN — CISATRACURIUM BESYLATE 10 MILLIGRAM(S): 2 INJECTION INTRAVENOUS at 10:02

## 2020-04-04 RX ADMIN — Medication 2: at 13:37

## 2020-04-04 RX ADMIN — CHLORHEXIDINE GLUCONATE 15 MILLILITER(S): 213 SOLUTION TOPICAL at 18:34

## 2020-04-04 RX ADMIN — Medication 2: at 19:09

## 2020-04-04 RX ADMIN — Medication 40 MILLIGRAM(S): at 18:34

## 2020-04-04 RX ADMIN — Medication 2: at 23:33

## 2020-04-04 RX ADMIN — LACTULOSE 20 GRAM(S): 10 SOLUTION ORAL at 18:34

## 2020-04-04 RX ADMIN — FAMOTIDINE 20 MILLIGRAM(S): 10 INJECTION INTRAVENOUS at 18:35

## 2020-04-04 RX ADMIN — AZITHROMYCIN 250 MILLIGRAM(S): 500 TABLET, FILM COATED ORAL at 15:04

## 2020-04-04 RX ADMIN — PIPERACILLIN AND TAZOBACTAM 25 GRAM(S): 4; .5 INJECTION, POWDER, LYOPHILIZED, FOR SOLUTION INTRAVENOUS at 15:04

## 2020-04-04 RX ADMIN — CLOPIDOGREL BISULFATE 75 MILLIGRAM(S): 75 TABLET, FILM COATED ORAL at 10:35

## 2020-04-04 RX ADMIN — SENNA PLUS 2 TABLET(S): 8.6 TABLET ORAL at 21:22

## 2020-04-04 RX ADMIN — Medication 81 MILLIGRAM(S): at 15:04

## 2020-04-04 RX ADMIN — PIPERACILLIN AND TAZOBACTAM 25 GRAM(S): 4; .5 INJECTION, POWDER, LYOPHILIZED, FOR SOLUTION INTRAVENOUS at 06:25

## 2020-04-04 RX ADMIN — POLYETHYLENE GLYCOL 3350 17 GRAM(S): 17 POWDER, FOR SOLUTION ORAL at 10:35

## 2020-04-04 RX ADMIN — Medication 2: at 00:07

## 2020-04-04 RX ADMIN — FENTANYL CITRATE 3 MICROGRAM(S)/KG/HR: 50 INJECTION INTRAVENOUS at 22:28

## 2020-04-04 RX ADMIN — Medication 200 MILLIGRAM(S): at 21:21

## 2020-04-04 NOTE — PROGRESS NOTE ADULT - ASSESSMENT
NEURO  - Sedation/Paralytics      - Fentanyl 2, Propofol 20, Nimbex started     - Pain Control    - Fentanyl 2    - Continue to monitor neurologic status     RESPIRATORY  - Acute hypoxic respiratory failure d/t +COVID-19     - Oxygen Therapy: AC      - Ventilator Settings: FiO2 100%, PEEP 18, RR 24,      - Most recent AB.44/38/61/75     - Secretions: no    - Daily CXR     - Continue ventilator weaning as tolerated     CARDIOVASCULAR  - Rhythm: NSR  - Daily EKG     - QTc: 430    - Hemodynamic support:      - Gtts: NEpi 0.04    - Triglycerides     - Pending     - Dysthrhythmias:      - None     GI  - Nutrition:      - Feeding modality: NPO for now due to coffee ground contents in stomach      - Tube Feeds -     - Type:       Goal Rate:       - Bowel Regimen      - Miralax, Senna      - Not moving bowels     - GI Prophylaxis:      - Pepcid     /RENAL  - + Villa      - Fluid Balance:      - UOP:     - BUN/Cr:      - Trend daily     - Monitor electrolytes     - Correct K<4, Mg< 2    HEME  - H/H:     - DVT Prophylaxis      - Lovenox      - +SCDs     ID  - COVID-19 Therapy      - Date of intubation: 4/3     - Date of extubation: x     - Current O2 therapy: Vent /380/24/15     - Position: supine      - Proning: yes           Date of last pronin/2      - Temperature: afebrile      - Azithromycin start date:    4/3                  End date:      - Plaquenil start date:      4/3                       End date:      - Tociluzamab:  Yes                           Date: 4/3     - Steroids: Solumedrol 40 IV x 12    - Access/Lines: L axillary AL, RIJ CVC      - Date placed: 4/3    - Sputum Culture : n/a     - Abx: cont. Zosyn for now     - Blood Culture: n/a     - Abx     ENDO   - Hx DM: No     - Current therapy: ISS      DISPOSITION:   - Code Status: Full Code  - Continue current ICU care NEURO  - Sedation/Paralytics      - Fentanyl 2, Propofol 20, Nimbex started     - Pain Control    - Fentanyl 2    - Continue to monitor neurologic status     RESPIRATORY  - Acute hypoxic respiratory failure d/t +COVID-19     - Oxygen Therapy: AC      - Ventilator Settings: FiO2 100%, PEEP 18, RR 24,      - Most recent AB.44/38/61/75     - Secretions: no    - Daily CXR     - Continue ventilator weaning as tolerated     CARDIOVASCULAR  - Rhythm: NSR  - Daily EKG     - QTc: 430    - Hemodynamic support:      - Gtts: NEpi 0.04    - Triglycerides     - Pending     - Dysthrhythmias:      - None     GI  - Nutrition:      - Feeding modality: NPO for now due to coffee ground contents in stomach      - Tube Feeds -     - Type:       Goal Rate:       - Bowel Regimen      - Miralax, Senna      - Not moving bowels     - GI Prophylaxis:      - Pepcid     /RENAL  - + Villa      - Fluid Balance:      - UOP:     - BUN/Cr:      - Trend daily     - Monitor electrolytes     - Correct K<4, Mg< 2    HEME  - H/H:     - DVT Prophylaxis      - Lovenox      - +SCDs     ID  - COVID-19 Therapy      - Date of intubation: 4/3     - Date of extubation: x     - Current O2 therapy: Vent /380/24/15     - Position: supine      - Proning: yes           Date of last pronin/2      - Temperature: afebrile      - Azithromycin start date:    4/3                  End date:      - Plaquenil start date:      4/3                       End date:      - Tociluzamab:  Yes                           Date: 4/3     - Steroids: Solumedrol 40 IV x 12    - Access/Lines: L axillary AL, RIJ CVC      - Date placed: 4/3    - Sputum Culture : n/a     - Abx: cont. Zosyn for now - likely stop in setting of negative cultures     ENDO   - Hx DM: No     - Current therapy: ISS      DISPOSITION:   - Code Status: Full Code  - Continue current ICU care

## 2020-04-04 NOTE — PROGRESS NOTE ADULT - SUBJECTIVE AND OBJECTIVE BOX
Patient discussed on rounds with: Dr. Butler     Primary Diagnosis: Acute hypoxic respiratory failure due to COVID-19    SUBJECTIVE:   76y/o male brought to [facility] ICU overnight after intubation.  Pt intubated/sedated.  Sedation: Fentanyl 2, Propofol 20.  Hemodynamic support: NEpi 0.04.  NGT placed overnight for distended abdomen.  200mL coffee ground output evacuated.  NGT continues to lower intermittent suction.    Pt afebrile.      Course:   4/3: Evaluation by Dr. Mera.  Start Azithro/Plaquenil.  Give 1 dose Toci.  Start steroids   Abd X-ray with ileus.    : Prone overnight, supinated this morning       VITAL SIGNS:  ICU Vital Signs Last 24 Hrs  T(C): 36.1 (2020 00:00), Max: 36.8 (2020 18:00)  T(F): 97 (2020 00:00), Max: 98.3 (2020 18:00)  HR: 112 (2020 10:00) (61 - 112)  BP: --  BP(mean): --  ABP: 110/61 (2020 10:00) (90/50 - 122/64)  ABP(mean): 80 (2020 10:00) (65 - 87)  RR: 17 (2020 10:00) (17 - 26)  SpO2: 95% (2020 10:00) (92% - 98%)      I&O's Summary    2020 07:01  -  2020 07:00  --------------------------------------------------------  IN: 1034.1 mL / OUT: 2145 mL / NET: -1110.9 mL    2020 07:01  -  2020 10:59  --------------------------------------------------------  IN: 4 mL / OUT: 0 mL / NET: 4 mL        Ventilator settings:  VENTILATOR SETTINGS:  Mode: AC  Settings: 100%, PEEP 18, RR 24,     AB.42/38/64/24/93    PHYSICAL EXAM:  Physical Exam performed by Intensivist to minimize risk of exposure.  Please refer to Attending Attestation for comprehensive exam    GENERAL: intubated/sedated   NEURO: intubated/sedated   RESP: coarse breath sounds bilaterally   CV: RRR  GI: abdomen distended, soft, hypoactive BS x 4 quads  : + Villa  PSYCH: sedated      MEDICATIONS:  MEDICATIONS  (STANDING):  aspirin enteric coated 81 milliGRAM(s) Oral daily  azithromycin   Tablet 250 milliGRAM(s) Oral every 24 hours  chlorhexidine 0.12% Liquid 15 milliLiter(s) Oral Mucosa every 12 hours  cisatracurium Infusion 3 MICROgram(s)/kG/Min (10.8 mL/Hr) IV Continuous <Continuous>  clopidogrel Tablet 75 milliGRAM(s) Oral daily  dextrose 5%. 1000 milliLiter(s) (50 mL/Hr) IV Continuous <Continuous>  dextrose 50% Injectable 12.5 Gram(s) IV Push once  dextrose 50% Injectable 25 Gram(s) IV Push once  dextrose 50% Injectable 25 Gram(s) IV Push once  enoxaparin Injectable 40 milliGRAM(s) SubCutaneous every 24 hours  famotidine Injectable 20 milliGRAM(s) IV Push every 12 hours  fentaNYL   Infusion. 0.5 MICROgram(s)/kG/Hr (3 mL/Hr) IV Continuous <Continuous>  hydroxychloroquine 200 milliGRAM(s) Oral every 12 hours  insulin lispro (HumaLOG) corrective regimen sliding scale   SubCutaneous every 6 hours  methylPREDNISolone sodium succinate Injectable 40 milliGRAM(s) IV Push two times a day  norepinephrine Infusion 0.05 MICROgram(s)/kG/Min (5.63 mL/Hr) IV Continuous <Continuous>  piperacillin/tazobactam IVPB.. 3.375 Gram(s) IV Intermittent every 8 hours  polyethylene glycol 3350 17 Gram(s) Oral daily  propofol Infusion 33.333 MICROgram(s)/kG/Min (12 mL/Hr) IV Continuous <Continuous>  senna 2 Tablet(s) Oral at bedtime    MEDICATIONS  (PRN):  acetaminophen  IVPB .. 1000 milliGRAM(s) IV Intermittent every 6 hours PRN Temp greater or equal to 38C (100.4F)  ALBUTerol    90 MICROgram(s) HFA Inhaler 2 Puff(s) Inhalation every 4 hours PRN Shortness of Breath and/or Wheezing  dextrose 40% Gel 15 Gram(s) Oral once PRN Blood Glucose LESS THAN 70 milliGRAM(s)/deciliter  glucagon  Injectable 1 milliGRAM(s) IntraMuscular once PRN Glucose LESS THAN 70 milligrams/deciliter      ALLERGIES/INTOLERANCES:  Allergies    sulfa drugs (Unknown)  sulfADIAZINE (Rash)    Intolerances          LABS:                        10.8   23.89 )-----------( 219      ( 2020 02:49 )             30.7     Auto Neutrophil %: 88.6 % (20 @ 02:49)  Auto Lymphocyte #: 0.41 K/uL (20 @ 02:49)      -    135  |  102  |  27<H>  ----------------------------<  148<H>  4.1   |  23  |  1.03    Ca    8.0<L>      2020 02:49  Phos  2.1     -  Mg     2.7     -    TPro  6.1  /  Alb  2.6<L>  /  TBili  0.5  /  DBili  x   /  AST  63<H>  /  ALT  45  /  AlkPhos  56  04-04      PT/INR - ( 2020 13:12 )   PT: 14.6 sec;   INR: 1.27          PTT - ( 2020 02:49 )  PTT:30.7 sec    Lactate, Blood: 1.5 mmol/L (20 @ 02:42)  Lactate, Blood: 1.8 mmol/L (20 @ 13:12)      CARDIAC MARKERS ( 2020 02:49 )  x     / <0.01 ng/mL / 100 U/L / x     / x      CARDIAC MARKERS ( 2020 20:59 )  x     / <0.01 ng/mL / 112 U/L / x     / 2.1 ng/mL            ABG - ( 2020 02:56 )  pH, Arterial: 7.42  pH, Blood: x     /  pCO2: 38    /  pO2: 64    / HCO3: 24    / Base Excess: -0.1  /  SaO2: 93        CoVID-specific labs:  Creatine Kinase, Serum: 100 U/L (20 @ 02:49)  Ferritin, Serum: 1641 ng/mL <H> (20 @ 02:49)  Sedimentation Rate, Erythrocyte: 120 mm/Hr <H> (20 @ 02:49)      Microbiology:   Culture - Blood (collected 2020 21:43)  Source: .Blood Blood  Preliminary Report (2020 22:11):    No growth at 1 day.    Culture - Blood (collected 2020 21:43)  Source: .Blood Blood  Preliminary Report (2020 22:11):    No growth at 1 day.        RADIOLOGY, EKG AND ADDITIONAL TESTS: Reviewed.  CoVID Basline labs:  T Cell Subsets:  ABS CD3: 214 /uL (20 @ 09:37)  ABS CD4: 131 /uL (20 @ 09:37)  ABS CD8: 70 /uL (20 @ 09:37)

## 2020-04-05 LAB
ALBUMIN SERPL ELPH-MCNC: 2.4 G/DL — LOW (ref 3.3–5)
ALP SERPL-CCNC: 93 U/L — SIGNIFICANT CHANGE UP (ref 40–120)
ALT FLD-CCNC: 102 U/L — HIGH (ref 10–45)
ANION GAP SERPL CALC-SCNC: 12 MMOL/L — SIGNIFICANT CHANGE UP (ref 5–17)
AST SERPL-CCNC: 74 U/L — HIGH (ref 10–40)
BASE EXCESS BLDA CALC-SCNC: 1.5 MMOL/L — SIGNIFICANT CHANGE UP (ref -2–3)
BASE EXCESS BLDA CALC-SCNC: 2.6 MMOL/L — SIGNIFICANT CHANGE UP (ref -2–3)
BASOPHILS # BLD AUTO: 0 K/UL — SIGNIFICANT CHANGE UP (ref 0–0.2)
BASOPHILS NFR BLD AUTO: 0 % — SIGNIFICANT CHANGE UP (ref 0–2)
BILIRUB SERPL-MCNC: 0.4 MG/DL — SIGNIFICANT CHANGE UP (ref 0.2–1.2)
BUN SERPL-MCNC: 30 MG/DL — HIGH (ref 7–23)
CALCIUM SERPL-MCNC: 7.8 MG/DL — LOW (ref 8.4–10.5)
CHLORIDE SERPL-SCNC: 103 MMOL/L — SIGNIFICANT CHANGE UP (ref 96–108)
CO2 SERPL-SCNC: 26 MMOL/L — SIGNIFICANT CHANGE UP (ref 22–31)
CREAT SERPL-MCNC: 1.01 MG/DL — SIGNIFICANT CHANGE UP (ref 0.5–1.3)
CRP SERPL-MCNC: 30.55 MG/DL — HIGH (ref 0–0.4)
D DIMER BLD IA.RAPID-MCNC: 1773 NG/ML DDU — HIGH
EOSINOPHIL # BLD AUTO: 0 K/UL — SIGNIFICANT CHANGE UP (ref 0–0.5)
EOSINOPHIL NFR BLD AUTO: 0 % — SIGNIFICANT CHANGE UP (ref 0–6)
FIBRINOGEN PPP-MCNC: 823 MG/DL — HIGH (ref 258–438)
GAS PNL BLDA: SIGNIFICANT CHANGE UP
GLUCOSE SERPL-MCNC: 163 MG/DL — HIGH (ref 70–99)
HCO3 BLDA-SCNC: 27 MMOL/L — SIGNIFICANT CHANGE UP (ref 21–28)
HCO3 BLDA-SCNC: 28 MMOL/L — SIGNIFICANT CHANGE UP (ref 21–28)
HCT VFR BLD CALC: 31.7 % — LOW (ref 39–50)
HGB BLD-MCNC: 10.8 G/DL — LOW (ref 13–17)
LYMPHOCYTES # BLD AUTO: 0.75 K/UL — LOW (ref 1–3.3)
LYMPHOCYTES # BLD AUTO: 2.8 % — LOW (ref 13–44)
MAGNESIUM SERPL-MCNC: 2.7 MG/DL — HIGH (ref 1.6–2.6)
MCHC RBC-ENTMCNC: 30.9 PG — SIGNIFICANT CHANGE UP (ref 27–34)
MCHC RBC-ENTMCNC: 34.1 GM/DL — SIGNIFICANT CHANGE UP (ref 32–36)
MCV RBC AUTO: 90.6 FL — SIGNIFICANT CHANGE UP (ref 80–100)
MONOCYTES # BLD AUTO: 0.24 K/UL — SIGNIFICANT CHANGE UP (ref 0–0.9)
MONOCYTES NFR BLD AUTO: 0.9 % — LOW (ref 2–14)
NEUTROPHILS # BLD AUTO: 24.78 K/UL — HIGH (ref 1.8–7.4)
NEUTROPHILS NFR BLD AUTO: 90.8 % — HIGH (ref 43–77)
PCO2 BLDA: 44 MMHG — SIGNIFICANT CHANGE UP (ref 35–48)
PCO2 BLDA: 44 MMHG — SIGNIFICANT CHANGE UP (ref 35–48)
PH BLDA: 7.4 — SIGNIFICANT CHANGE UP (ref 7.35–7.45)
PH BLDA: 7.41 — SIGNIFICANT CHANGE UP (ref 7.35–7.45)
PHOSPHATE SERPL-MCNC: 2.2 MG/DL — LOW (ref 2.5–4.5)
PLATELET # BLD AUTO: 262 K/UL — SIGNIFICANT CHANGE UP (ref 150–400)
PO2 BLDA: 60 MMHG — LOW (ref 83–108)
PO2 BLDA: 60 MMHG — LOW (ref 83–108)
POTASSIUM SERPL-MCNC: 4.1 MMOL/L — SIGNIFICANT CHANGE UP (ref 3.5–5.3)
POTASSIUM SERPL-SCNC: 4.1 MMOL/L — SIGNIFICANT CHANGE UP (ref 3.5–5.3)
PROT SERPL-MCNC: 6.1 G/DL — SIGNIFICANT CHANGE UP (ref 6–8.3)
RBC # BLD: 3.5 M/UL — LOW (ref 4.2–5.8)
RBC # FLD: 14 % — SIGNIFICANT CHANGE UP (ref 10.3–14.5)
SAO2 % BLDA: 92 % — LOW (ref 95–100)
SAO2 % BLDA: 92 % — LOW (ref 95–100)
SODIUM SERPL-SCNC: 141 MMOL/L — SIGNIFICANT CHANGE UP (ref 135–145)
WBC # BLD: 26.73 K/UL — HIGH (ref 3.8–10.5)
WBC # FLD AUTO: 26.73 K/UL — HIGH (ref 3.8–10.5)

## 2020-04-05 PROCEDURE — 93010 ELECTROCARDIOGRAM REPORT: CPT

## 2020-04-05 PROCEDURE — 71045 X-RAY EXAM CHEST 1 VIEW: CPT | Mod: 26

## 2020-04-05 RX ORDER — FUROSEMIDE 40 MG
40 TABLET ORAL ONCE
Refills: 0 | Status: COMPLETED | OUTPATIENT
Start: 2020-04-05 | End: 2020-04-05

## 2020-04-05 RX ADMIN — Medication 2: at 04:36

## 2020-04-05 RX ADMIN — Medication 2: at 23:00

## 2020-04-05 RX ADMIN — ENOXAPARIN SODIUM 40 MILLIGRAM(S): 100 INJECTION SUBCUTANEOUS at 04:32

## 2020-04-05 RX ADMIN — FAMOTIDINE 20 MILLIGRAM(S): 10 INJECTION INTRAVENOUS at 18:11

## 2020-04-05 RX ADMIN — LACTULOSE 20 GRAM(S): 10 SOLUTION ORAL at 04:32

## 2020-04-05 RX ADMIN — CLOPIDOGREL BISULFATE 75 MILLIGRAM(S): 75 TABLET, FILM COATED ORAL at 13:01

## 2020-04-05 RX ADMIN — Medication 40 MILLIGRAM(S): at 04:33

## 2020-04-05 RX ADMIN — Medication 200 MILLIGRAM(S): at 08:54

## 2020-04-05 RX ADMIN — Medication 81 MILLIGRAM(S): at 13:01

## 2020-04-05 RX ADMIN — FAMOTIDINE 20 MILLIGRAM(S): 10 INJECTION INTRAVENOUS at 04:33

## 2020-04-05 RX ADMIN — Medication 2: at 14:00

## 2020-04-05 RX ADMIN — PROPOFOL 12 MICROGRAM(S)/KG/MIN: 10 INJECTION, EMULSION INTRAVENOUS at 18:11

## 2020-04-05 RX ADMIN — Medication 200 MILLIGRAM(S): at 22:16

## 2020-04-05 RX ADMIN — PROPOFOL 12 MICROGRAM(S)/KG/MIN: 10 INJECTION, EMULSION INTRAVENOUS at 13:01

## 2020-04-05 RX ADMIN — AZITHROMYCIN 250 MILLIGRAM(S): 500 TABLET, FILM COATED ORAL at 13:02

## 2020-04-05 RX ADMIN — CHLORHEXIDINE GLUCONATE 15 MILLILITER(S): 213 SOLUTION TOPICAL at 04:33

## 2020-04-05 RX ADMIN — Medication 40 MILLIGRAM(S): at 18:11

## 2020-04-05 RX ADMIN — CHLORHEXIDINE GLUCONATE 15 MILLILITER(S): 213 SOLUTION TOPICAL at 18:02

## 2020-04-05 NOTE — PROGRESS NOTE ADULT - SUBJECTIVE AND OBJECTIVE BOX
Patient discussed on rounds with: Dr. Butler     Primary Diagnosis: PNA and acute hypoxia respiratory failure due to COVID-19     SUBJECTIVE:   76y/o male brought to [facility] ICU overnight after intubation.  Pt intubated/sedated.  Sedation: Fentanyl 2, Propofol 20.  Hemodynamic support: NEpi 0.04.  NGT placed overnight for distended abdomen.  200mL coffee ground output evacuated.  NGT continues to lower intermittent suction.    Pt afebrile.      Interval events:    4/3: Evaluation by Dr. Mera.  Start Azithro/Plaquenil.  Give 1 dose Toci.  Start steroids   Abd X-ray with ileus.    4/4: Prone overnight, supinated this morning   4/5: Pt remained prone o/n, Lasix 40mg given x1 dose given.  Supinated in am, tachy into 130s.  Bowel regimen held for diarrhea. Cont Azithro 250 QD and Plaquenil 200 q12hrs (until 4/7).     OBJECTIVE:    T(C): 37.2 (04-05-20 @ 13:00), Max: 37.6 (04-05-20 @ 01:00)  HR: 77 (04-05-20 @ 13:48) (77 - 126)  BP: --  RR: 30 (04-05-20 @ 13:00) (24 - 30)  SpO2: 95% (04-05-20 @ 13:48) (92% - 96%)      04-04-20 @ 07:01  -  04-05-20 @ 07:00  --------------------------------------------------------  IN: 1427.8 mL / OUT: 1510 mL / NET: -82.2 mL    04-05-20 @ 07:01  -  04-05-20 @ 15:35  --------------------------------------------------------  IN: 96 mL / OUT: 605 mL / NET: -509 mL        VENTILATOR SETTINGS:  Mode:  Settings:     ABG:     PHYSICAL EXAM:  Physical Exam performed by Intensivist to minimize risk of exposure.  Please refer to Attending Attestation for comprehensive exam    GENERAL:  NEURO:   RESP:  CV:  GI:  : +/- Villa  PSYCH:    LABS:                        10.8   26.73 )-----------( 262      ( 05 Apr 2020 13:42 )             31.7     04-05    141  |  103  |  30<H>  ----------------------------<  163<H>  4.1   |  26  |  1.01    Ca    7.8<L>      05 Apr 2020 13:41  Phos  2.2     04-05  Mg     2.7     04-05    TPro  6.1  /  Alb  2.4<L>  /  TBili  0.4  /  DBili  x   /  AST  74<H>  /  ALT  102<H>  /  AlkPhos  93  04-05    CAPILLARY BLOOD GLUCOSE      POCT Blood Glucose.: 175 mg/dL (04 Apr 2020 23:18)    CARDIAC MARKERS ( 04 Apr 2020 02:49 )  x     / <0.01 ng/mL / 100 U/L / x     / x          LIVER FUNCTIONS - ( 05 Apr 2020 13:41 )  Alb: 2.4 g/dL / Pro: 6.1 g/dL / ALK PHOS: 93 U/L / ALT: 102 U/L / AST: 74 U/L / GGT: x           PTT - ( 04 Apr 2020 02:49 )  PTT:30.7 sec    MEDICATIONS:  MEDICATIONS  (STANDING):  aspirin enteric coated 81 milliGRAM(s) Oral daily  azithromycin   Tablet 250 milliGRAM(s) Oral every 24 hours  chlorhexidine 0.12% Liquid 15 milliLiter(s) Oral Mucosa every 12 hours  cisatracurium Infusion 3 MICROgram(s)/kG/Min (10.8 mL/Hr) IV Continuous <Continuous>  clopidogrel Tablet 75 milliGRAM(s) Oral daily  dextrose 5%. 1000 milliLiter(s) (50 mL/Hr) IV Continuous <Continuous>  dextrose 50% Injectable 12.5 Gram(s) IV Push once  dextrose 50% Injectable 25 Gram(s) IV Push once  dextrose 50% Injectable 25 Gram(s) IV Push once  enoxaparin Injectable 40 milliGRAM(s) SubCutaneous every 24 hours  famotidine Injectable 20 milliGRAM(s) IV Push every 12 hours  fentaNYL   Infusion. 0.5 MICROgram(s)/kG/Hr (3 mL/Hr) IV Continuous <Continuous>  furosemide   Injectable 40 milliGRAM(s) IV Push once  hydroxychloroquine 200 milliGRAM(s) Oral every 12 hours  insulin lispro (HumaLOG) corrective regimen sliding scale   SubCutaneous every 6 hours  methylPREDNISolone sodium succinate Injectable 40 milliGRAM(s) IV Push two times a day  norepinephrine Infusion 0.05 MICROgram(s)/kG/Min (5.63 mL/Hr) IV Continuous <Continuous>  propofol Infusion 33.333 MICROgram(s)/kG/Min (12 mL/Hr) IV Continuous <Continuous>    MEDICATIONS  (PRN):  acetaminophen  IVPB .. 1000 milliGRAM(s) IV Intermittent every 6 hours PRN Temp greater or equal to 38C (100.4F)  ALBUTerol    90 MICROgram(s) HFA Inhaler 2 Puff(s) Inhalation every 4 hours PRN Shortness of Breath and/or Wheezing  dextrose 40% Gel 15 Gram(s) Oral once PRN Blood Glucose LESS THAN 70 milliGRAM(s)/deciliter  glucagon  Injectable 1 milliGRAM(s) IntraMuscular once PRN Glucose LESS THAN 70 milligrams/deciliter    MEDICATIONS  (PRN):  acetaminophen  IVPB .. 1000 milliGRAM(s) IV Intermittent every 6 hours PRN Temp greater or equal to 38C (100.4F)  ALBUTerol    90 MICROgram(s) HFA Inhaler 2 Puff(s) Inhalation every 4 hours PRN Shortness of Breath and/or Wheezing  dextrose 40% Gel 15 Gram(s) Oral once PRN Blood Glucose LESS THAN 70 milliGRAM(s)/deciliter  glucagon  Injectable 1 milliGRAM(s) IntraMuscular once PRN Glucose LESS THAN 70 milligrams/deciliter Patient discussed on rounds with: Dr. Butler     Primary Diagnosis: PNA and acute hypoxia respiratory failure due to COVID-19     SUBJECTIVE:   76y/o male brought to [facility] ICU overnight after intubation.  Pt intubated/sedated.  Sedation: Fentanyl 2, Propofol 20.  Hemodynamic support: NEpi 0.04.  NGT placed overnight for distended abdomen.  200mL coffee ground output evacuated.  NGT continues to lower intermittent suction.    Pt afebrile.      Interval events:    4/3: Evaluation by Dr. Mera.  Start Azithro/Plaquenil.  Give 1 dose Toci.  Start steroids   Abd X-ray with ileus.    : Prone overnight, supinated this morning   : Pt remained prone o/n, Lasix 40mg given x1 dose given.  Supinated in am, tachy into 130s.  Bowel regimen held for diarrhea. Cont Azithro 250 QD and Plaquenil 200 q12hrs (until ).     OBJECTIVE:    T(C): 37.2 (20 @ 13:00), Max: 37.6 (20 @ 01:00)  HR: 77 (20 @ 13:48) (77 - 126)  BP: --  RR: 30 (20 @ 13:00) (24 - 30)  SpO2: 95% (20 @ 13:48) (92% - 96%)      20 @ 07:01  -  20 @ 07:00  --------------------------------------------------------  IN: 1427.8 mL / OUT: 1510 mL / NET: -82.2 mL    20 @ 07:01  -  20 @ 15:35  --------------------------------------------------------  IN: 96 mL / OUT: 605 mL / NET: -509 mL        VENTILATOR SETTINGS:   Mode: AC   Settings: 30/400/60/15     AB.27/64/67/29 ---> 7.40/44/60/27    PHYSICAL EXAM:  Physical Exam performed by Intensivist to minimize risk of exposure.  Please refer to Attending Attestation for comprehensive exam    GENERAL: intubated/sedated   NEURO: intubated/sedated   RESP: coarse breath sounds bilaterally   CV: RRR  GI: abdomen distended, soft, hypoactive BS x 4 quads  : + Villa  PSYCH: sedated    LABS:                        10.8   26.73 )-----------( 262      ( 2020 13:42 )             31.7     04-05    141  |  103  |  30<H>  ----------------------------<  163<H>  4.1   |  26  |  1.01    Ca    7.8<L>      2020 13:41  Phos  2.2     04-05  Mg     2.7     04-05    TPro  6.1  /  Alb  2.4<L>  /  TBili  0.4  /  DBili  x   /  AST  74<H>  /  ALT  102<H>  /  AlkPhos  93  04-05    CAPILLARY BLOOD GLUCOSE      POCT Blood Glucose.: 175 mg/dL (2020 23:18)    CARDIAC MARKERS ( 2020 02:49 )  x     / <0.01 ng/mL / 100 U/L / x     / x          LIVER FUNCTIONS - ( 2020 13:41 )  Alb: 2.4 g/dL / Pro: 6.1 g/dL / ALK PHOS: 93 U/L / ALT: 102 U/L / AST: 74 U/L / GGT: x           PTT - ( 2020 02:49 )  PTT:30.7 sec    MEDICATIONS:  MEDICATIONS  (STANDING):  aspirin enteric coated 81 milliGRAM(s) Oral daily  azithromycin   Tablet 250 milliGRAM(s) Oral every 24 hours  chlorhexidine 0.12% Liquid 15 milliLiter(s) Oral Mucosa every 12 hours  cisatracurium Infusion 3 MICROgram(s)/kG/Min (10.8 mL/Hr) IV Continuous <Continuous>  clopidogrel Tablet 75 milliGRAM(s) Oral daily  dextrose 5%. 1000 milliLiter(s) (50 mL/Hr) IV Continuous <Continuous>  dextrose 50% Injectable 12.5 Gram(s) IV Push once  dextrose 50% Injectable 25 Gram(s) IV Push once  dextrose 50% Injectable 25 Gram(s) IV Push once  enoxaparin Injectable 40 milliGRAM(s) SubCutaneous every 24 hours  famotidine Injectable 20 milliGRAM(s) IV Push every 12 hours  fentaNYL   Infusion. 0.5 MICROgram(s)/kG/Hr (3 mL/Hr) IV Continuous <Continuous>  furosemide   Injectable 40 milliGRAM(s) IV Push once  hydroxychloroquine 200 milliGRAM(s) Oral every 12 hours  insulin lispro (HumaLOG) corrective regimen sliding scale   SubCutaneous every 6 hours  methylPREDNISolone sodium succinate Injectable 40 milliGRAM(s) IV Push two times a day  norepinephrine Infusion 0.05 MICROgram(s)/kG/Min (5.63 mL/Hr) IV Continuous <Continuous>  propofol Infusion 33.333 MICROgram(s)/kG/Min (12 mL/Hr) IV Continuous <Continuous>    MEDICATIONS  (PRN):  acetaminophen  IVPB .. 1000 milliGRAM(s) IV Intermittent every 6 hours PRN Temp greater or equal to 38C (100.4F)  ALBUTerol    90 MICROgram(s) HFA Inhaler 2 Puff(s) Inhalation every 4 hours PRN Shortness of Breath and/or Wheezing  dextrose 40% Gel 15 Gram(s) Oral once PRN Blood Glucose LESS THAN 70 milliGRAM(s)/deciliter  glucagon  Injectable 1 milliGRAM(s) IntraMuscular once PRN Glucose LESS THAN 70 milligrams/deciliter    MEDICATIONS  (PRN):  acetaminophen  IVPB .. 1000 milliGRAM(s) IV Intermittent every 6 hours PRN Temp greater or equal to 38C (100.4F)  ALBUTerol    90 MICROgram(s) HFA Inhaler 2 Puff(s) Inhalation every 4 hours PRN Shortness of Breath and/or Wheezing  dextrose 40% Gel 15 Gram(s) Oral once PRN Blood Glucose LESS THAN 70 milliGRAM(s)/deciliter  glucagon  Injectable 1 milliGRAM(s) IntraMuscular once PRN Glucose LESS THAN 70 milligrams/deciliter

## 2020-04-05 NOTE — PROGRESS NOTE ADULT - ASSESSMENT
NEURO  - Sedation/Paralytics      - Propofol 55mcg, Fentanyl 4.5mcg, Nimbex 3mcg     - Continue to monitor neurologic status     RESPIRATORY  - Acute hypoxic respiratory failure d/t +COVID-19     - Oxygen Therapy: AC      - Ventilator Settings: 30/400/60/15      - Most recent AB.40/44/60/27    - Daily CXR     - Continue ventilator weaning as tolerated     CARDIOVASCULAR  - Rhythm: NSR   - Daily EKG     - QTc: 441  - ASA 81mg QD   - Clopidogrel 75mg QD     - Hemodynamic support:      - Gtts: off levophed     - Triglycerides     - ordered for 4/6 am labs     - Dysthrhythmias:      - none     GI  - Nutrition:      - Feeding modality: NGT      - Tube Feeds: Jevity 1.5 @ 10ml/hr; pt likely to be prone again this afternoon, h/o coffee ground emesis, will hold for emesis     - Bowel Regimen: d/c'd d/t numerous episodes of diarrhea      - + BM today     - GI Prophylaxis: Pepcid 20mg IV push q12hrs     /RENAL  - + Villa     - Fluid Balance: -82.2      - UOP: 1460ml in 24hrs     - BUN/Cr: 30/1.01; stable, cont to trend     - Monitor electrolytes     - Correct if K<4, Mg< 2    HEME  - H/H: 10.8/31.7, stable, cont to trend     - DVT Prophylaxis      - Lovenox 40mg QD       - +SCDs     ID  - COVID-19 Therapy      - Date of intubation:        - Date of extubation: N/A      - Current O2 therapy: AC full vent      - Position: prone (supine in am, resumed prone this afternoon)     - Proning: Yes         Date of last pronin/4- current      - Temperature: afebrile      - Azithromycin start date: 4/3                     End date:      - Plaquenil start date: 4/3                          End date:      - Tociluzamab:  No                                   Date: N/A      - Steroids: Solumedrol 40mg IV q12hr     - Access/Lines: L axillary a-line, RIJ CVC      - Date placed: 4/3     - Sputum Culture: N/A      - Abx: zosyn d/c'd     - Blood Culture: , no growth to date      - Abx: none     ENDO   - Hx DM: No      - Current therapy: ISS      - Blood glucose goal <180, cont ISS for now       DISPOSITION:   - Code Status:   - Continue current ICU care

## 2020-04-06 LAB
4/8 RATIO: 2.76 RATIO — SIGNIFICANT CHANGE UP (ref 0.86–4.14)
ABS CD8: 39 /UL — LOW (ref 90–775)
ALBUMIN SERPL ELPH-MCNC: 2.1 G/DL — LOW (ref 3.3–5)
ALBUMIN SERPL ELPH-MCNC: 2.4 G/DL — LOW (ref 3.3–5)
ALP SERPL-CCNC: 83 U/L — SIGNIFICANT CHANGE UP (ref 40–120)
ALP SERPL-CCNC: 86 U/L — SIGNIFICANT CHANGE UP (ref 40–120)
ALT FLD-CCNC: 83 U/L — HIGH (ref 10–45)
ALT FLD-CCNC: 84 U/L — HIGH (ref 10–45)
ANION GAP SERPL CALC-SCNC: 11 MMOL/L — SIGNIFICANT CHANGE UP (ref 5–17)
ANION GAP SERPL CALC-SCNC: 12 MMOL/L — SIGNIFICANT CHANGE UP (ref 5–17)
APPEARANCE UR: ABNORMAL
AST SERPL-CCNC: 54 U/L — HIGH (ref 10–40)
AST SERPL-CCNC: 68 U/L — HIGH (ref 10–40)
BASE EXCESS BLDA CALC-SCNC: 2.6 MMOL/L — SIGNIFICANT CHANGE UP (ref -2–3)
BASE EXCESS BLDA CALC-SCNC: 3.6 MMOL/L — HIGH (ref -2–3)
BASOPHILS # BLD AUTO: 0 K/UL — SIGNIFICANT CHANGE UP (ref 0–0.2)
BASOPHILS NFR BLD AUTO: 0 % — SIGNIFICANT CHANGE UP (ref 0–2)
BILIRUB SERPL-MCNC: 0.4 MG/DL — SIGNIFICANT CHANGE UP (ref 0.2–1.2)
BILIRUB SERPL-MCNC: 0.5 MG/DL — SIGNIFICANT CHANGE UP (ref 0.2–1.2)
BILIRUB UR-MCNC: NEGATIVE — SIGNIFICANT CHANGE UP
BUN SERPL-MCNC: 42 MG/DL — HIGH (ref 7–23)
BUN SERPL-MCNC: 50 MG/DL — HIGH (ref 7–23)
CALCIUM SERPL-MCNC: 7.6 MG/DL — LOW (ref 8.4–10.5)
CALCIUM SERPL-MCNC: 7.7 MG/DL — LOW (ref 8.4–10.5)
CD3 BLASTS SPEC-ACNC: 156 /UL — LOW (ref 396–2024)
CD3 BLASTS SPEC-ACNC: 37 % — LOW (ref 58–84)
CD4 %: 26 % — LOW (ref 30–56)
CD8 %: 9 % — LOW (ref 11–43)
CHLORIDE SERPL-SCNC: 102 MMOL/L — SIGNIFICANT CHANGE UP (ref 96–108)
CHLORIDE SERPL-SCNC: 102 MMOL/L — SIGNIFICANT CHANGE UP (ref 96–108)
CO2 SERPL-SCNC: 24 MMOL/L — SIGNIFICANT CHANGE UP (ref 22–31)
CO2 SERPL-SCNC: 26 MMOL/L — SIGNIFICANT CHANGE UP (ref 22–31)
COLOR SPEC: YELLOW — SIGNIFICANT CHANGE UP
CREAT SERPL-MCNC: 1.14 MG/DL — SIGNIFICANT CHANGE UP (ref 0.5–1.3)
CREAT SERPL-MCNC: 1.17 MG/DL — SIGNIFICANT CHANGE UP (ref 0.5–1.3)
CRP SERPL-MCNC: 22.77 MG/DL — HIGH (ref 0–0.4)
CULTURE RESULTS: NO GROWTH — SIGNIFICANT CHANGE UP
CULTURE RESULTS: NO GROWTH — SIGNIFICANT CHANGE UP
D DIMER BLD IA.RAPID-MCNC: 2714 NG/ML DDU — HIGH
DIFF PNL FLD: ABNORMAL
EOSINOPHIL # BLD AUTO: 0 K/UL — SIGNIFICANT CHANGE UP (ref 0–0.5)
EOSINOPHIL NFR BLD AUTO: 0 % — SIGNIFICANT CHANGE UP (ref 0–6)
ERYTHROCYTE [SEDIMENTATION RATE] IN BLOOD: 129 MM/HR — HIGH
FERRITIN SERPL-MCNC: 2636 NG/ML — HIGH (ref 30–400)
FIBRINOGEN PPP-MCNC: 728 MG/DL — HIGH (ref 258–438)
GAS PNL BLDA: SIGNIFICANT CHANGE UP
GLUCOSE SERPL-MCNC: 154 MG/DL — HIGH (ref 70–99)
GLUCOSE SERPL-MCNC: 163 MG/DL — HIGH (ref 70–99)
GLUCOSE UR QL: NEGATIVE — SIGNIFICANT CHANGE UP
HCO3 BLDA-SCNC: 27 MMOL/L — SIGNIFICANT CHANGE UP (ref 21–28)
HCO3 BLDA-SCNC: 27 MMOL/L — SIGNIFICANT CHANGE UP (ref 21–28)
HCT VFR BLD CALC: 29 % — LOW (ref 39–50)
HCT VFR BLD CALC: 29.7 % — LOW (ref 39–50)
HGB BLD-MCNC: 10.1 G/DL — LOW (ref 13–17)
HGB BLD-MCNC: 10.2 G/DL — LOW (ref 13–17)
KETONES UR-MCNC: NEGATIVE — SIGNIFICANT CHANGE UP
LDH SERPL L TO P-CCNC: 471 U/L — HIGH (ref 50–242)
LEUKOCYTE ESTERASE UR-ACNC: ABNORMAL
LYMPHOCYTES # BLD AUTO: 0.28 K/UL — LOW (ref 1–3.3)
LYMPHOCYTES # BLD AUTO: 1.9 % — LOW (ref 13–44)
MAGNESIUM SERPL-MCNC: 2.8 MG/DL — HIGH (ref 1.6–2.6)
MAGNESIUM SERPL-MCNC: 3.1 MG/DL — HIGH (ref 1.6–2.6)
MCHC RBC-ENTMCNC: 30.7 PG — SIGNIFICANT CHANGE UP (ref 27–34)
MCHC RBC-ENTMCNC: 31.1 PG — SIGNIFICANT CHANGE UP (ref 27–34)
MCHC RBC-ENTMCNC: 34.3 GM/DL — SIGNIFICANT CHANGE UP (ref 32–36)
MCHC RBC-ENTMCNC: 34.8 GM/DL — SIGNIFICANT CHANGE UP (ref 32–36)
MCV RBC AUTO: 89.2 FL — SIGNIFICANT CHANGE UP (ref 80–100)
MCV RBC AUTO: 89.5 FL — SIGNIFICANT CHANGE UP (ref 80–100)
MONOCYTES # BLD AUTO: 0.15 K/UL — SIGNIFICANT CHANGE UP (ref 0–0.9)
MONOCYTES NFR BLD AUTO: 1 % — LOW (ref 2–14)
NEUTROPHILS # BLD AUTO: 14.03 K/UL — HIGH (ref 1.8–7.4)
NEUTROPHILS NFR BLD AUTO: 94.3 % — HIGH (ref 43–77)
NITRITE UR-MCNC: NEGATIVE — SIGNIFICANT CHANGE UP
NRBC # BLD: 0 /100 WBCS — SIGNIFICANT CHANGE UP (ref 0–0)
NRBC # BLD: SIGNIFICANT CHANGE UP /100 WBCS (ref 0–0)
PCO2 BLDA: 35 MMHG — SIGNIFICANT CHANGE UP (ref 35–48)
PCO2 BLDA: 39 MMHG — SIGNIFICANT CHANGE UP (ref 35–48)
PH BLDA: 7.45 — SIGNIFICANT CHANGE UP (ref 7.35–7.45)
PH BLDA: 7.5 — HIGH (ref 7.35–7.45)
PH UR: 6 — SIGNIFICANT CHANGE UP (ref 5–8)
PHOSPHATE SERPL-MCNC: 2.2 MG/DL — LOW (ref 2.5–4.5)
PHOSPHATE SERPL-MCNC: 2.2 MG/DL — LOW (ref 2.5–4.5)
PLATELET # BLD AUTO: 257 K/UL — SIGNIFICANT CHANGE UP (ref 150–400)
PLATELET # BLD AUTO: 260 K/UL — SIGNIFICANT CHANGE UP (ref 150–400)
PO2 BLDA: 132 MMHG — HIGH (ref 83–108)
PO2 BLDA: 159 MMHG — HIGH (ref 83–108)
POTASSIUM SERPL-MCNC: 4.2 MMOL/L — SIGNIFICANT CHANGE UP (ref 3.5–5.3)
POTASSIUM SERPL-MCNC: 4.3 MMOL/L — SIGNIFICANT CHANGE UP (ref 3.5–5.3)
POTASSIUM SERPL-SCNC: 4.2 MMOL/L — SIGNIFICANT CHANGE UP (ref 3.5–5.3)
POTASSIUM SERPL-SCNC: 4.3 MMOL/L — SIGNIFICANT CHANGE UP (ref 3.5–5.3)
PROCALCITONIN SERPL-MCNC: 0.46 NG/ML — HIGH (ref 0.02–0.1)
PROT SERPL-MCNC: 5.7 G/DL — LOW (ref 6–8.3)
PROT SERPL-MCNC: 5.7 G/DL — LOW (ref 6–8.3)
PROT UR-MCNC: 30 MG/DL
RBC # BLD: 3.25 M/UL — LOW (ref 4.2–5.8)
RBC # BLD: 3.32 M/UL — LOW (ref 4.2–5.8)
RBC # FLD: 13.8 % — SIGNIFICANT CHANGE UP (ref 10.3–14.5)
RBC # FLD: 13.9 % — SIGNIFICANT CHANGE UP (ref 10.3–14.5)
SAO2 % BLDA: 99 % — SIGNIFICANT CHANGE UP (ref 95–100)
SAO2 % BLDA: 99 % — SIGNIFICANT CHANGE UP (ref 95–100)
SODIUM SERPL-SCNC: 138 MMOL/L — SIGNIFICANT CHANGE UP (ref 135–145)
SODIUM SERPL-SCNC: 139 MMOL/L — SIGNIFICANT CHANGE UP (ref 135–145)
SP GR SPEC: >=1.03 — SIGNIFICANT CHANGE UP (ref 1–1.03)
SPECIMEN SOURCE: SIGNIFICANT CHANGE UP
SPECIMEN SOURCE: SIGNIFICANT CHANGE UP
T-CELL CD4 SUBSET PNL BLD: 108 /UL — LOW (ref 325–1251)
TRIGL SERPL-MCNC: 751 MG/DL — HIGH (ref 10–149)
TROPONIN T SERPL-MCNC: <0.01 NG/ML — SIGNIFICANT CHANGE UP (ref 0–0.01)
UROBILINOGEN FLD QL: 0.2 E.U./DL — SIGNIFICANT CHANGE UP
WBC # BLD: 14.88 K/UL — HIGH (ref 3.8–10.5)
WBC # BLD: 20.73 K/UL — HIGH (ref 3.8–10.5)
WBC # FLD AUTO: 14.88 K/UL — HIGH (ref 3.8–10.5)
WBC # FLD AUTO: 20.73 K/UL — HIGH (ref 3.8–10.5)

## 2020-04-06 PROCEDURE — 71045 X-RAY EXAM CHEST 1 VIEW: CPT | Mod: 26,77

## 2020-04-06 PROCEDURE — 93010 ELECTROCARDIOGRAM REPORT: CPT

## 2020-04-06 PROCEDURE — 71045 X-RAY EXAM CHEST 1 VIEW: CPT | Mod: 26

## 2020-04-06 RX ORDER — POTASSIUM PHOSPHATE, MONOBASIC POTASSIUM PHOSPHATE, DIBASIC 236; 224 MG/ML; MG/ML
15 INJECTION, SOLUTION INTRAVENOUS ONCE
Refills: 0 | Status: COMPLETED | OUTPATIENT
Start: 2020-04-06 | End: 2020-04-06

## 2020-04-06 RX ORDER — ACETAMINOPHEN 500 MG
1000 TABLET ORAL ONCE
Refills: 0 | Status: COMPLETED | OUTPATIENT
Start: 2020-04-06 | End: 2020-04-06

## 2020-04-06 RX ADMIN — CHLORHEXIDINE GLUCONATE 15 MILLILITER(S): 213 SOLUTION TOPICAL at 05:00

## 2020-04-06 RX ADMIN — Medication 2: at 13:19

## 2020-04-06 RX ADMIN — Medication 81 MILLIGRAM(S): at 13:17

## 2020-04-06 RX ADMIN — Medication 2: at 05:21

## 2020-04-06 RX ADMIN — Medication 400 MILLIGRAM(S): at 19:21

## 2020-04-06 RX ADMIN — Medication 40 MILLIGRAM(S): at 19:31

## 2020-04-06 RX ADMIN — CLOPIDOGREL BISULFATE 75 MILLIGRAM(S): 75 TABLET, FILM COATED ORAL at 13:18

## 2020-04-06 RX ADMIN — ENOXAPARIN SODIUM 40 MILLIGRAM(S): 100 INJECTION SUBCUTANEOUS at 05:00

## 2020-04-06 RX ADMIN — FAMOTIDINE 20 MILLIGRAM(S): 10 INJECTION INTRAVENOUS at 19:31

## 2020-04-06 RX ADMIN — FAMOTIDINE 20 MILLIGRAM(S): 10 INJECTION INTRAVENOUS at 05:00

## 2020-04-06 RX ADMIN — CHLORHEXIDINE GLUCONATE 15 MILLILITER(S): 213 SOLUTION TOPICAL at 19:30

## 2020-04-06 RX ADMIN — AZITHROMYCIN 250 MILLIGRAM(S): 500 TABLET, FILM COATED ORAL at 13:17

## 2020-04-06 RX ADMIN — Medication 40 MILLIGRAM(S): at 05:00

## 2020-04-06 RX ADMIN — Medication 200 MILLIGRAM(S): at 21:14

## 2020-04-06 RX ADMIN — Medication 200 MILLIGRAM(S): at 10:12

## 2020-04-06 NOTE — PROGRESS NOTE ADULT - ASSESSMENT
75yMale PMH of COPD (no history of exacerbations or intubations), CAD s/p multiple stents, hypertension who presents due to shortness of breath, cough, and fever. Admitted on 3/31, intubated on 4/2.     1. Neurovascular: No delirium  -Intubation requiring sedation  -Drips:  -PRNs:  -Monitor neuro status    2. Respiratory: Acute hypoxemic respiratory failure 2/2 COVID.    -Date Intubated: 3/31    -Vent settings: 70%/400/RR 30/peep 15  -Position:  -CXR:  -Continue vent weaning as tolerated    3. Cardiovascular: Pressor requirement 2/2 sedation/pain regimen.   -Daily EKG's to assess for QT prolongation    -QTc: pending  -Drips: levo 0.01, fentanyl 4.5, nimbex 5, propofol 50  -Monitor HR/BP/Tele    4. GI: NPO  -Nutrition:  -Prophylaxis: Pepcid 20mg q12h  -C/w bowel regimen    5. /Renal: +Villa  -BUN/Cr:   -Trend Cr on AM labs  -Monitor UOP  -Replete electrolytes as needed for goal K+ 4.0, Phos 3.0, Mg 2.0    6. ID: Sepsis 2/2 COVID   -Plaquenil 400mg BID x 2 doses then 200mg BID x 4 days  (enddate 4/7)    -Azithromycin 250mg QD for hospital course, max 14-day dose (4/7)     -Tocilizumab 8mg/kg for one dose if approved--not given     -Continue with regular surveillance labs including CBC with diff, CMP, Mg, Phos, CRP, ABG, Ferritin, CK, Triglycerides, Procalcitonin, D-Dimer, EKG  -Additional labs q3d: ESR, T-cell subset, LDH, Ferritin, CK, Troponin, Coags  -COVID isolation protocol     7. Endo:  --insulin sliding scale    8. Heme:   -H/H:  -Continue to monitor on daily and q3d labs as above.  -DVT ppx: Lovenox SQ 40m Q24 hours  -ecotrin 81mg daily  -plavix 75mg daily    9. Lines: 4/3 RT IJ TLC, 4/3 Lt Axillary [place]  Disposition: Full Code.   Remain in ICU    Critical Care Time Spent: 70 mins 75yMale PMH of COPD (no history of exacerbations or intubations), CAD s/p multiple stents, hypertension who presents due to shortness of breath, cough, and fever. Admitted on 3/31, intubated on 4/2.     1. Neurovascular: No delirium  -Intubation requiring sedation  -Drips:  -PRNs:  -Monitor neuro status    2. Respiratory: Acute hypoxemic respiratory failure 2/2 COVID.    -Date Intubated: 3/31  nimbex 5    -Vent settings: 70%/400/RR 30/peep 15  -Position:  -CXR:  -Continue vent weaning as tolerated    3. Cardiovascular: Pressor requirement 2/2 sedation/pain regimen.   -Daily EKG's to assess for QT prolongation    -QTc: pending  -Drips: levo 0.01, fentanyl 4.5, nimbex 5, propofol 50  -ecotrin 81mg   -plavix 75mg  -Monitor HR/BP/Tele    4. GI: Jevity 10cc/hr  -Nutrition:  -Prophylaxis: Pepcid 20mg q12h  -C/w bowel regimen    5. /Renal: +Villa  -BUN/Cr:   -Trend Cr twice daily  -Monitor UOP  -Replete electrolytes as needed for goal K+ 4.0, Phos 3.0, Mg 2.0    6. ID: Sepsis 2/2 COVID   -Plaquenil 400mg BID x 2 doses then 200mg BID x 4 days  (enddate 4/7)    -Azithromycin 250mg QD for hospital course, max 14-day dose (4/7)     -Tocilizumab 8mg/kg for one dose if approved--not given     -Continue with regular surveillance labs including CBC with diff, CMP, Mg, Phos, CRP, ABG, Ferritin, CK, Triglycerides, Procalcitonin, D-Dimer, EKG  -Additional labs q3d: ESR, T-cell subset, LDH, Ferritin, CK, Troponin, Coags  -COVID isolation protocol     7. Endo:  --insulin sliding scale    8. Heme:   -H/H:  -Continue to monitor on daily and q3d labs as above.  -DVT ppx: Lovenox SQ 40m Q24 hours  -ecotrin 81mg daily  -plavix 75mg daily    9. Lines: 4/3 RT IJ TLC, 4/3 Lt Axillary [place]  Disposition: Full Code.   Remain in ICU    Critical Care Time Spent: 70 mins 75yMale PMH of COPD (no history of exacerbations or intubations), CAD s/p multiple stents, hypertension who presents due to shortness of breath, cough, and fever. Admitted on 3/31, intubated on 4/2.     1. Neurovascular: No delirium  -Intubation requiring sedation  -Drips:  -PRNs:  -Monitor neuro status    2. Respiratory: Acute hypoxemic respiratory failure 2/2 COVID.    -Date Intubated: 3/31  nimbex 5    -Vent settings: 70%/400/RR 30/peep 15  -Position:  -CXR:  -Continue vent weaning as tolerated    3. Cardiovascular: Pressor requirement 2/2 sedation/pain regimen.   -Daily EKG's to assess for QT prolongation    -QTc: 452  -Drips: levo 0.01, fentanyl 4.5, nimbex 5, propofol 50  -ecotrin 81mg   -plavix 75mg  -Monitor HR/BP/Tele    4. GI: Jevity 10cc/hr  -Nutrition:  -Prophylaxis: Pepcid 20mg q12h  -C/w bowel regimen    5. /Renal: +Villa  -BUN/Cr:   -Trend Cr twice daily  -Monitor UOP  -Replete electrolytes as needed for goal K+ 4.0, Phos 3.0, Mg 2.0    6. ID: Sepsis 2/2 COVID   -Plaquenil 400mg BID x 2 doses then 200mg BID x 4 days  (enddate 4/7)    -Azithromycin 250mg QD for hospital course, max 14-day dose (4/7)     -Tocilizumab 8mg/kg for one dose if approved--not given     -Continue with regular surveillance labs including CBC with diff, CMP, Mg, Phos, CRP, ABG, Ferritin, CK, Triglycerides, Procalcitonin, D-Dimer, EKG  -Additional labs q3d: ESR, T-cell subset, LDH, Ferritin, CK, Troponin, Coags  -COVID isolation protocol     7. Endo:  --insulin sliding scale    8. Heme:   -H/H:  -Continue to monitor on daily and q3d labs as above.  -DVT ppx: Lovenox SQ 40m Q24 hours  -ecotrin 81mg daily  -plavix 75mg daily    9. Lines: 4/3 RT IJ TLC, 4/3 Lt Axillary [place]  Disposition: Full Code.   Remain in ICU    Critical Care Time Spent: 70 mins

## 2020-04-06 NOTE — PROGRESS NOTE ADULT - SUBJECTIVE AND OBJECTIVE BOX
Patient Discussed on Morning Rounds with Dr. Bower    Primary Diagnosis: COVID Pneumonia    SUBJECTIVE:  75yMale PMH of COPD (no history of exacerbations or intubations), CAD s/p multiple stents, hypertension who presents due to shortness of breath, cough, and fever for 1 week. Patient follows with Dr. Mera as an outpatient and completed 5 days of azithromycin and cefdinir, last dose 2 days ago and his shortness of breath worsened. On BIPAP, increasing abdominal distention, intubated for hypoxia on 4/3. 4/4: Nimbex started, Improved hypoxia; NO dc'd today.4/6 febrile overnight to 101. pan culture ordered.     Interval Events:  Overnight, patient febrile to 101    OBJECTIVE:  Vitals: ICU Vital Signs Last 24 Hrs  T(C): 38.3 (06 Apr 2020 05:00), Max: 38.3 (06 Apr 2020 05:00)  T(F): 101 (06 Apr 2020 05:00), Max: 101 (06 Apr 2020 05:00)  HR: 76 (06 Apr 2020 08:00) (75 - 82)  BP: --  BP(mean): --  ABP: 119/68 (06 Apr 2020 08:00) (84/54 - 120/69)  ABP(mean): 89 (06 Apr 2020 08:00) (64 - 90)  RR: 30 (06 Apr 2020 08:00) (30 - 30)  SpO2: 100% (06 Apr 2020 08:00) (94% - 100%)    I&O:  I&O's Detail    05 Apr 2020 07:01  -  06 Apr 2020 07:00  --------------------------------------------------------  IN:    cisatracurium Infusion: 198 mL    fentaNYL Infusion.: 315 mL    norepinephrine Infusion: 10.8 mL    ns in tub fed  [id number]: 170 mL    propofol Infusion: 213 mL  Total IN: 906.8 mL    OUT:    Indwelling Catheter - Urethral: 1395 mL    Nasoenteral Tube: 60 mL  Total OUT: 1455 mL    Total NET: -548.2 mL      06 Apr 2020 07:01  -  06 Apr 2020 09:47  --------------------------------------------------------  IN:    cisatracurium Infusion: 15 mL    fentaNYL Infusion.: 22.5 mL    norepinephrine Infusion: 0.9 mL    ns in tub fed  [id number]: 10 mL    propofol Infusion: 15 mL  Total IN: 63.4 mL    OUT:    Indwelling Catheter - Urethral: 100 mL  Total OUT: 100 mL    Total NET: -36.6 mL        VENTILATOR SETTINGS:  Mode: AC/ CMV (Assist Control/ Continuous Mandatory Ventilation)  RR (machine): 30  TV (machine): 400  FiO2: 70  PEEP: 15  ITime: 1  MAP: 25  PIP: 34    ABG - ( 06 Apr 2020 02:28 )  pH, Arterial: 7.45  pH, Blood: x     /  pCO2: 39    /  pO2: 132   / HCO3: 27    / Base Excess: 2.6   /  SaO2: 99                  PHYSICAL EXAM: Physical Exam performed by Intensivist to minimize risk of exposure.  Please refer to Attending Attestation for comprehensive exam.   General: No acute distress  Neuro: [Sedated]  CV: RRR  Pulm: [INTUBATED]  : [Villa]  Psych: affect appropriate    LABS:                         10.1   20.73 )-----------( 257      ( 06 Apr 2020 02:24 )             29.0   04-06    139  |  102  |  42<H>  ----------------------------<  154<H>  4.3   |  26  |  1.17    Ca    7.7<L>      06 Apr 2020 02:24  Phos  2.2     04-06  Mg     2.8     04-06    TPro  5.7<L>  /  Alb  2.4<L>  /  TBili  0.4  /  DBili  x   /  AST  54<H>  /  ALT  83<H>  /  AlkPhos  83  04-06  Urinalysis Basic - ( 06 Apr 2020 08:08 )    Color: Yellow / Appearance: SL Cloudy / SG: >=1.030 / pH: x  Gluc: x / Ketone: NEGATIVE  / Bili: Negative / Urobili: 0.2 E.U./dL   Blood: x / Protein: 30 mg/dL / Nitrite: NEGATIVE   Leuk Esterase: Trace / RBC: > 10 /HPF / WBC 5-10 /HPF   Sq Epi: x / Non Sq Epi: 0-5 /HPF / Bacteria: None /HPF      Ferritin, Serum: 2636 ng/mL (04-06-20 @ 02:24)  D-Dimer Assay, Quantitative: 2714 ng/mL DDU (04-06-20 @ 02:24)  Sedimentation Rate, Erythrocyte: 129 mm/Hr (04-06-20 @ 02:24)  Troponin T, Serum: <0.01 ng/mL (04-06-20 @ 02:24)  Triglycerides, Serum: 751 mg/dL (04-06-20 @ 02:24)  D-Dimer Assay, Quantitative: 1773 ng/mL DDU (04-05-20 @ 13:41)  ABG - ( 06 Apr 2020 02:28 )  pH, Arterial: 7.45  pH, Blood: x     /  pCO2: 39    /  pO2: 132   / HCO3: 27    / Base Excess: 2.6   /  SaO2: 99                CAPILLARY BLOOD GLUCOSE      POCT Blood Glucose.: 166 mg/dL (06 Apr 2020 05:05)  POCT Blood Glucose.: 155 mg/dL (05 Apr 2020 22:20)  POCT Blood Glucose.: 146 mg/dL (05 Apr 2020 17:13)     MEDICATIONS  (STANDING):  aspirin enteric coated 81 milliGRAM(s) Oral daily  azithromycin   Tablet 250 milliGRAM(s) Oral every 24 hours  chlorhexidine 0.12% Liquid 15 milliLiter(s) Oral Mucosa every 12 hours  cisatracurium Infusion 3 MICROgram(s)/kG/Min (10.8 mL/Hr) IV Continuous <Continuous>  clopidogrel Tablet 75 milliGRAM(s) Oral daily  dextrose 5%. 1000 milliLiter(s) (50 mL/Hr) IV Continuous <Continuous>  dextrose 50% Injectable 12.5 Gram(s) IV Push once  dextrose 50% Injectable 25 Gram(s) IV Push once  dextrose 50% Injectable 25 Gram(s) IV Push once  enoxaparin Injectable 40 milliGRAM(s) SubCutaneous every 24 hours  famotidine Injectable 20 milliGRAM(s) IV Push every 12 hours  fentaNYL   Infusion. 0.5 MICROgram(s)/kG/Hr (3 mL/Hr) IV Continuous <Continuous>  furosemide   Injectable 40 milliGRAM(s) IV Push once  hydroxychloroquine 200 milliGRAM(s) Oral every 12 hours  insulin lispro (HumaLOG) corrective regimen sliding scale   SubCutaneous every 6 hours  methylPREDNISolone sodium succinate Injectable 40 milliGRAM(s) IV Push two times a day  norepinephrine Infusion 0.05 MICROgram(s)/kG/Min (5.63 mL/Hr) IV Continuous <Continuous>  propofol Infusion 33.333 MICROgram(s)/kG/Min (12 mL/Hr) IV Continuous <Continuous>    MEDICATIONS  (PRN):  acetaminophen  IVPB .. 1000 milliGRAM(s) IV Intermittent every 6 hours PRN Temp greater or equal to 38C (100.4F)  ALBUTerol    90 MICROgram(s) HFA Inhaler 2 Puff(s) Inhalation every 4 hours PRN Shortness of Breath and/or Wheezing  dextrose 40% Gel 15 Gram(s) Oral once PRN Blood Glucose LESS THAN 70 milliGRAM(s)/deciliter  glucagon  Injectable 1 milliGRAM(s) IntraMuscular once PRN Glucose LESS THAN 70 milligrams/deciliter      RADIOLOGY/OTHER STUDIES:    < from: Xray Chest 1 View- PORTABLE-Routine (04.06.20 @ 08:32) >  Bedside examination of the chest dated 4/6/2020 8:32 AM is compared to the previous study of 4/5/2020.    Findings: Endotracheal tube, NG tube, and right-sided central line remain in place. No significant change extensive bilateral infiltrates. No pleural effusion. No new finding.    Impression: No change extensive bilateral infiltrates.    < end of copied text >

## 2020-04-07 LAB
ALBUMIN SERPL ELPH-MCNC: 2.2 G/DL — LOW (ref 3.3–5)
ALP SERPL-CCNC: 98 U/L — SIGNIFICANT CHANGE UP (ref 40–120)
ALT FLD-CCNC: 127 U/L — HIGH (ref 10–45)
ANION GAP SERPL CALC-SCNC: 11 MMOL/L — SIGNIFICANT CHANGE UP (ref 5–17)
AST SERPL-CCNC: 112 U/L — HIGH (ref 10–40)
BASE EXCESS BLDA CALC-SCNC: 1.8 MMOL/L — SIGNIFICANT CHANGE UP (ref -2–3)
BASE EXCESS BLDA CALC-SCNC: 2.3 MMOL/L — SIGNIFICANT CHANGE UP (ref -2–3)
BASOPHILS # BLD AUTO: 0 K/UL — SIGNIFICANT CHANGE UP (ref 0–0.2)
BASOPHILS NFR BLD AUTO: 0 % — SIGNIFICANT CHANGE UP (ref 0–2)
BILIRUB SERPL-MCNC: 0.7 MG/DL — SIGNIFICANT CHANGE UP (ref 0.2–1.2)
BUN SERPL-MCNC: 43 MG/DL — HIGH (ref 7–23)
CALCIUM SERPL-MCNC: 7.7 MG/DL — LOW (ref 8.4–10.5)
CHLORIDE SERPL-SCNC: 104 MMOL/L — SIGNIFICANT CHANGE UP (ref 96–108)
CK SERPL-CCNC: 453 U/L — HIGH (ref 30–200)
CO2 SERPL-SCNC: 24 MMOL/L — SIGNIFICANT CHANGE UP (ref 22–31)
CREAT SERPL-MCNC: 0.92 MG/DL — SIGNIFICANT CHANGE UP (ref 0.5–1.3)
CRP SERPL-MCNC: 14.86 MG/DL — HIGH (ref 0–0.4)
D DIMER BLD IA.RAPID-MCNC: 1415 NG/ML DDU — HIGH
EOSINOPHIL # BLD AUTO: 0 K/UL — SIGNIFICANT CHANGE UP (ref 0–0.5)
EOSINOPHIL NFR BLD AUTO: 0 % — SIGNIFICANT CHANGE UP (ref 0–6)
ERYTHROCYTE [SEDIMENTATION RATE] IN BLOOD: 142 MM/HR — HIGH
FERRITIN SERPL-MCNC: 3240 NG/ML — HIGH (ref 30–400)
FIBRINOGEN PPP-MCNC: 772 MG/DL — HIGH (ref 258–438)
GAS PNL BLDA: SIGNIFICANT CHANGE UP
GLUCOSE SERPL-MCNC: 182 MG/DL — HIGH (ref 70–99)
HCO3 BLDA-SCNC: 24 MMOL/L — SIGNIFICANT CHANGE UP (ref 21–28)
HCO3 BLDA-SCNC: 25 MMOL/L — SIGNIFICANT CHANGE UP (ref 21–28)
HCT VFR BLD CALC: 29.2 % — LOW (ref 39–50)
HGB BLD-MCNC: 10.2 G/DL — LOW (ref 13–17)
LDH SERPL L TO P-CCNC: 532 U/L — HIGH (ref 50–242)
LYMPHOCYTES # BLD AUTO: 0.5 K/UL — LOW (ref 1–3.3)
LYMPHOCYTES # BLD AUTO: 3 % — LOW (ref 13–44)
MAGNESIUM SERPL-MCNC: 2.8 MG/DL — HIGH (ref 1.6–2.6)
MCHC RBC-ENTMCNC: 30.9 PG — SIGNIFICANT CHANGE UP (ref 27–34)
MCHC RBC-ENTMCNC: 34.9 GM/DL — SIGNIFICANT CHANGE UP (ref 32–36)
MCV RBC AUTO: 88.5 FL — SIGNIFICANT CHANGE UP (ref 80–100)
MONOCYTES # BLD AUTO: 0.5 K/UL — SIGNIFICANT CHANGE UP (ref 0–0.9)
MONOCYTES NFR BLD AUTO: 3 % — SIGNIFICANT CHANGE UP (ref 2–14)
NEUTROPHILS # BLD AUTO: 15.13 K/UL — HIGH (ref 1.8–7.4)
NEUTROPHILS NFR BLD AUTO: 91 % — HIGH (ref 43–77)
NRBC # BLD: SIGNIFICANT CHANGE UP /100 WBCS (ref 0–0)
NT-PROBNP SERPL-SCNC: 191 PG/ML — SIGNIFICANT CHANGE UP (ref 0–300)
PCO2 BLDA: 32 MMHG — LOW (ref 35–48)
PCO2 BLDA: 32 MMHG — LOW (ref 35–48)
PH BLDA: 7.5 — HIGH (ref 7.35–7.45)
PH BLDA: 7.51 — HIGH (ref 7.35–7.45)
PHOSPHATE SERPL-MCNC: 2.5 MG/DL — SIGNIFICANT CHANGE UP (ref 2.5–4.5)
PLATELET # BLD AUTO: 266 K/UL — SIGNIFICANT CHANGE UP (ref 150–400)
PO2 BLDA: 136 MMHG — HIGH (ref 83–108)
PO2 BLDA: 67 MMHG — LOW (ref 83–108)
POTASSIUM SERPL-MCNC: 4 MMOL/L — SIGNIFICANT CHANGE UP (ref 3.5–5.3)
POTASSIUM SERPL-SCNC: 4 MMOL/L — SIGNIFICANT CHANGE UP (ref 3.5–5.3)
PROCALCITONIN SERPL-MCNC: 0.3 NG/ML — HIGH (ref 0.02–0.1)
PROT SERPL-MCNC: 5.9 G/DL — LOW (ref 6–8.3)
RBC # BLD: 3.3 M/UL — LOW (ref 4.2–5.8)
RBC # FLD: 13.5 % — SIGNIFICANT CHANGE UP (ref 10.3–14.5)
SAO2 % BLDA: 95 % — SIGNIFICANT CHANGE UP (ref 95–100)
SAO2 % BLDA: 99 % — SIGNIFICANT CHANGE UP (ref 95–100)
SODIUM SERPL-SCNC: 139 MMOL/L — SIGNIFICANT CHANGE UP (ref 135–145)
TROPONIN T SERPL-MCNC: <0.01 NG/ML — SIGNIFICANT CHANGE UP (ref 0–0.01)
WBC # BLD: 16.63 K/UL — HIGH (ref 3.8–10.5)
WBC # FLD AUTO: 16.63 K/UL — HIGH (ref 3.8–10.5)

## 2020-04-07 PROCEDURE — 71045 X-RAY EXAM CHEST 1 VIEW: CPT | Mod: 26

## 2020-04-07 PROCEDURE — 71045 X-RAY EXAM CHEST 1 VIEW: CPT | Mod: 26,77

## 2020-04-07 PROCEDURE — 93010 ELECTROCARDIOGRAM REPORT: CPT

## 2020-04-07 RX ORDER — DEXMEDETOMIDINE HYDROCHLORIDE IN 0.9% SODIUM CHLORIDE 4 UG/ML
0.07 INJECTION INTRAVENOUS
Qty: 200 | Refills: 0 | Status: DISCONTINUED | OUTPATIENT
Start: 2020-04-07 | End: 2020-04-10

## 2020-04-07 RX ORDER — FENTANYL CITRATE 50 UG/ML
0.5 INJECTION INTRAVENOUS
Qty: 2500 | Refills: 0 | Status: DISCONTINUED | OUTPATIENT
Start: 2020-04-07 | End: 2020-04-14

## 2020-04-07 RX ORDER — QUETIAPINE FUMARATE 200 MG/1
50 TABLET, FILM COATED ORAL DAILY
Refills: 0 | Status: DISCONTINUED | OUTPATIENT
Start: 2020-04-07 | End: 2020-04-10

## 2020-04-07 RX ADMIN — Medication 40 MILLIGRAM(S): at 17:36

## 2020-04-07 RX ADMIN — PROPOFOL 12 MICROGRAM(S)/KG/MIN: 10 INJECTION, EMULSION INTRAVENOUS at 19:16

## 2020-04-07 RX ADMIN — QUETIAPINE FUMARATE 50 MILLIGRAM(S): 200 TABLET, FILM COATED ORAL at 21:14

## 2020-04-07 RX ADMIN — Medication 2: at 05:51

## 2020-04-07 RX ADMIN — DEXMEDETOMIDINE HYDROCHLORIDE IN 0.9% SODIUM CHLORIDE 1 MICROGRAM(S)/KG/HR: 4 INJECTION INTRAVENOUS at 15:00

## 2020-04-07 RX ADMIN — FAMOTIDINE 20 MILLIGRAM(S): 10 INJECTION INTRAVENOUS at 05:14

## 2020-04-07 RX ADMIN — Medication 200 MILLIGRAM(S): at 11:06

## 2020-04-07 RX ADMIN — PROPOFOL 12 MICROGRAM(S)/KG/MIN: 10 INJECTION, EMULSION INTRAVENOUS at 12:04

## 2020-04-07 RX ADMIN — CHLORHEXIDINE GLUCONATE 15 MILLILITER(S): 213 SOLUTION TOPICAL at 05:13

## 2020-04-07 RX ADMIN — FAMOTIDINE 20 MILLIGRAM(S): 10 INJECTION INTRAVENOUS at 17:36

## 2020-04-07 RX ADMIN — CLOPIDOGREL BISULFATE 75 MILLIGRAM(S): 75 TABLET, FILM COATED ORAL at 11:06

## 2020-04-07 RX ADMIN — Medication 81 MILLIGRAM(S): at 11:06

## 2020-04-07 RX ADMIN — ENOXAPARIN SODIUM 40 MILLIGRAM(S): 100 INJECTION SUBCUTANEOUS at 05:14

## 2020-04-07 RX ADMIN — CHLORHEXIDINE GLUCONATE 15 MILLILITER(S): 213 SOLUTION TOPICAL at 17:36

## 2020-04-07 RX ADMIN — Medication 40 MILLIGRAM(S): at 05:14

## 2020-04-07 NOTE — PROGRESS NOTE ADULT - SUBJECTIVE AND OBJECTIVE BOX
75yMale PMH of COPD (no history of exacerbations or intubations), CAD s/p multiple stents, hypertension who presents due to shortness of breath, cough, and fever for 1 week. Patient follows with Dr. Mera as an outpatient and completed 5 days of azithromycin and cefdinir, last dose 2 days ago and his shortness of breath worsened. On BIPAP, increasing abdominal distention, intubated for hypoxia on 4/3. Patient Discussed on Morning Rounds with Dr. Bower    Primary Diagnosis: COVID Pneumonia    Interval Events:  4/6 proned at 6: 30pm.   4/7 supinated this am @ 9:30am. Nimbex dcd, weaning fentanyl/propofol      OBJECTIVE:  Vitals: ICU Vital Signs Last 24 Hrs  T(C): 37.1 (07 Apr 2020 05:00), Max: 37.6 (06 Apr 2020 23:00)  T(F): 98.7 (07 Apr 2020 05:00), Max: 99.7 (06 Apr 2020 23:00)  HR: 56 (07 Apr 2020 08:00) (56 - 72)  BP: --  BP(mean): --  ABP: 175/77 (07 Apr 2020 08:00) (98/57 - 175/77)  ABP(mean): 113 (07 Apr 2020 08:00) (74 - 117)  RR: 30 (07 Apr 2020 08:00) (30 - 31)  SpO2: 100% (07 Apr 2020 08:00) (100% - 100%)    I&O: -1871  I&O's Detail    06 Apr 2020 07:01  -  07 Apr 2020 07:00  --------------------------------------------------------  IN:    cisatracurium Infusion: 345 mL    fentaNYL Infusion.: 517.5 mL    norepinephrine Infusion: 0.9 mL    ns in tub fed  [id number]: 210 mL    propofol Infusion: 345 mL  Total IN: 1418.4 mL    OUT:    Indwelling Catheter - Urethral: 1540 mL  Total OUT: 1540 mL    Total NET: -121.6 mL      07 Apr 2020 07:01  -  07 Apr 2020 10:04  --------------------------------------------------------  IN:    cisatracurium Infusion: 15 mL    fentaNYL Infusion.: 22.5 mL    ns in tub fed  [id number]: 10 mL    propofol Infusion: 15 mL  Total IN: 62.5 mL    OUT:    Indwelling Catheter - Urethral: 60 mL  Total OUT: 60 mL    Total NET: 2.5 mL        VENTILATOR SETTINGS:  Mode: AC/ CMV (Assist Control/ Continuous Mandatory Ventilation)  RR (machine): 30  TV (machine): 400  FiO2: 40  PEEP: 10  ITime: 1  MAP: 21  PIP: 29    ABG - ( 07 Apr 2020 03:06 )  pH, Arterial: 7.51  pH, Blood: x     /  pCO2: 32    /  pO2: 136   / HCO3: 25    / Base Excess: 2.3   /  SaO2: 99                  PHYSICAL EXAM: Physical Exam performed by Intensivist to minimize risk of exposure.  Please refer to Attending Attestation for comprehensive exam.   General: No acute distress  Neuro: [Sedated]  CV: RRR  Pulm: [INTUBATED]  : [Villa]  Psych: affect appropriate    LABS:                         10.2   16.63 )-----------( 266      ( 07 Apr 2020 03:04 )             29.2   04-07    139  |  104  |  43<H>  ----------------------------<  182<H>  4.0   |  24  |  0.92    Ca    7.7<L>      07 Apr 2020 03:04  Phos  2.5     04-07  Mg     2.8     04-07    TPro  5.9<L>  /  Alb  2.2<L>  /  TBili  0.7  /  DBili  x   /  AST  112<H>  /  ALT  127<H>  /  AlkPhos  98  04-07  Urinalysis Basic - ( 06 Apr 2020 08:08 )    Color: Yellow / Appearance: SL Cloudy / SG: >=1.030 / pH: x  Gluc: x / Ketone: NEGATIVE  / Bili: Negative / Urobili: 0.2 E.U./dL   Blood: x / Protein: 30 mg/dL / Nitrite: NEGATIVE   Leuk Esterase: Trace / RBC: > 10 /HPF / WBC 5-10 /HPF   Sq Epi: x / Non Sq Epi: 0-5 /HPF / Bacteria: None /HPF      Sedimentation Rate, Erythrocyte: 142 mm/Hr (04-07-20 @ 03:04)  Ferritin, Serum: 3240 ng/mL (04-07-20 @ 03:04)  Troponin T, Serum: <0.01 ng/mL (04-07-20 @ 03:04)  D-Dimer Assay, Quantitative: 1415 ng/mL DDU (04-07-20 @ 03:04)  ABG - ( 07 Apr 2020 03:06 )  pH, Arterial: 7.51  pH, Blood: x     /  pCO2: 32    /  pO2: 136   / HCO3: 25    / Base Excess: 2.3   /  SaO2: 99            CAPILLARY BLOOD GLUCOSE      POCT Blood Glucose.: 158 mg/dL (07 Apr 2020 05:16)  POCT Blood Glucose.: 134 mg/dL (06 Apr 2020 22:26)  POCT Blood Glucose.: 113 mg/dL (06 Apr 2020 19:10)     MEDICATIONS  (STANDING):  aspirin enteric coated 81 milliGRAM(s) Oral daily  chlorhexidine 0.12% Liquid 15 milliLiter(s) Oral Mucosa every 12 hours  clopidogrel Tablet 75 milliGRAM(s) Oral daily  enoxaparin Injectable 40 milliGRAM(s) SubCutaneous every 24 hours  famotidine Injectable 20 milliGRAM(s) IV Push every 12 hours  fentaNYL   Infusion. 0.5 MICROgram(s)/kG/Hr (3 mL/Hr) IV Continuous <Continuous>  hydroxychloroquine 200 milliGRAM(s) Oral every 12 hours  insulin lispro (HumaLOG) corrective regimen sliding scale   SubCutaneous every 6 hours  methylPREDNISolone sodium succinate Injectable 40 milliGRAM(s) IV Push two times a day  norepinephrine Infusion 0.05 MICROgram(s)/kG/Min (5.63 mL/Hr) IV Continuous <Continuous>  propofol Infusion 33.333 MICROgram(s)/kG/Min (12 mL/Hr) IV Continuous <Continuous>    MEDICATIONS  (PRN):  acetaminophen  IVPB .. 1000 milliGRAM(s) IV Intermittent every 6 hours PRN Temp greater or equal to 38C (100.4F)  ALBUTerol    90 MICROgram(s) HFA Inhaler 2 Puff(s) Inhalation every 4 hours PRN Shortness of Breath and/or Wheezing  dextrose 40% Gel 15 Gram(s) Oral once PRN Blood Glucose LESS THAN 70 milliGRAM(s)/deciliter  glucagon  Injectable 1 milliGRAM(s) IntraMuscular once PRN Glucose LESS THAN 70 milligrams/deciliter

## 2020-04-07 NOTE — PROGRESS NOTE ADULT - ASSESSMENT
1. Neurovascular: No delirium  -Intubation requiring sedation  -Drips:  jazmyn  propofol  -Monitor neuro status    2. Respiratory: Acute hypoxemic respiratory failure 2/2 COVID.    -Date Intubated:4/2  -COVID pneumonia:     -Plaquenil 400mg BID x 2 doses then 200mg BID x 4 days  (completed 4/7)    -Azithromycin 250mg QD for hospital course, max 14-day dose (completed 4/7)     -Tocilizumab 8mg/kg for one dose if approved (not given)    -Vent settings:  40%/400/rr 30/peep 10  -Position:  -Continue vent weaning as tolerated    3. Cardiovascular: Pressor requirement 2/2 sedation/pain regimen.   -Daily EKG's to assess for QT prolongation    -QTc: pending  -Drips:  -Monitor HR/BP/Tele    4. GI: NPO  -Nutrition:  Jevity 10cc/her  -Prophylaxis: Pepcid  -C/w bowel regimen    5. /Renal: +Villa  -BUN/Cr: 43/0.9  -Trend Cr on AM labs  -Monitor UOP  -Replete electrolytes as needed for goal K+ 4.0, Phos 3.0, Mg 2.0    6. ID:   -WBC:  16.6   -Continue with regular surveillance labs including CBC with diff, CMP, Mg, Phos, CRP, ABG, Ferritin, CK, Triglycerides, Procalcitonin, D-Dimer, EKG  -Additional labs q3d: ESR, T-cell subset, LDH, Ferritin, CK, Troponin, Coags  -COVID isolation protocol     7. Endo:  insulin ss    8. Heme:   -H/H: 10/29  -Continue to monitor on daily and q3d labs as above.  -DVT ppx: Lovenox 40mg daily SQ and SCDs     Disposition: Full Code    Critical Care Time Spent: 70 mins 1. Neurovascular: No delirium  -Intubation requiring sedation  -Drips:  jazmyn  propofol  -Monitor neuro status    2. Respiratory: Acute hypoxemic respiratory failure 2/2 COVID.    -Date Intubated:4/2  -COVID pneumonia:     -Plaquenil 400mg BID x 2 doses then 200mg BID x 4 days  (completed 4/7)    -Azithromycin 250mg QD for hospital course, max 14-day dose (completed 4/7)     -Tocilizumab 8mg/kg for one dose if approved (not given)    -Vent settings:  40%/400/rr 30/peep 10  -Position:  -Continue vent weaning as tolerated    3. Cardiovascular: Pressor requirement 2/2 sedation/pain regimen.   -Daily EKG's to assess for QT prolongation    -QTc: pending  -Drips:  -Monitor HR/BP/Tele    4. GI: NPO  -Nutrition:  Jevity increase to 20cc/hr  -Prophylaxis: Pepcid  -C/w bowel regimen    5. /Renal: +Villa  -BUN/Cr: 43/0.9  -Trend Cr on AM labs  -Monitor UOP  -Replete electrolytes as needed for goal K+ 4.0, Phos 3.0, Mg 2.0    6. ID:   -WBC:  16.6   -Continue with regular surveillance labs including CBC with diff, CMP, Mg, Phos, CRP, ABG, Ferritin, CK, Triglycerides, Procalcitonin, D-Dimer, EKG  -Additional labs q3d: ESR, T-cell subset, LDH, Ferritin, CK, Troponin, Coags  -COVID isolation protocol     7. Endo:  insulin ss    8. Heme:   -H/H: 10/29  -Continue to monitor on daily and q3d labs as above.  -DVT ppx: Lovenox 40mg daily SQ and SCDs     Disposition: Full Code    Critical Care Time Spent: 70 mins

## 2020-04-08 LAB
ALBUMIN SERPL ELPH-MCNC: 2.5 G/DL — LOW (ref 3.3–5)
ALP SERPL-CCNC: 116 U/L — SIGNIFICANT CHANGE UP (ref 40–120)
ALT FLD-CCNC: 167 U/L — HIGH (ref 10–45)
ANION GAP SERPL CALC-SCNC: 14 MMOL/L — SIGNIFICANT CHANGE UP (ref 5–17)
AST SERPL-CCNC: 139 U/L — HIGH (ref 10–40)
BASE EXCESS BLDA CALC-SCNC: 0.5 MMOL/L — SIGNIFICANT CHANGE UP (ref -2–3)
BASOPHILS # BLD AUTO: 0 K/UL — SIGNIFICANT CHANGE UP (ref 0–0.2)
BASOPHILS NFR BLD AUTO: 0 % — SIGNIFICANT CHANGE UP (ref 0–2)
BILIRUB SERPL-MCNC: 0.8 MG/DL — SIGNIFICANT CHANGE UP (ref 0.2–1.2)
BUN SERPL-MCNC: 40 MG/DL — HIGH (ref 7–23)
CALCIUM SERPL-MCNC: 8.2 MG/DL — LOW (ref 8.4–10.5)
CHLORIDE SERPL-SCNC: 107 MMOL/L — SIGNIFICANT CHANGE UP (ref 96–108)
CK SERPL-CCNC: 247 U/L — HIGH (ref 30–200)
CO2 SERPL-SCNC: 22 MMOL/L — SIGNIFICANT CHANGE UP (ref 22–31)
CREAT SERPL-MCNC: 0.86 MG/DL — SIGNIFICANT CHANGE UP (ref 0.5–1.3)
CRP SERPL-MCNC: 20.45 MG/DL — HIGH (ref 0–0.4)
D DIMER BLD IA.RAPID-MCNC: 1199 NG/ML DDU — HIGH
EOSINOPHIL # BLD AUTO: 0 K/UL — SIGNIFICANT CHANGE UP (ref 0–0.5)
EOSINOPHIL NFR BLD AUTO: 0 % — SIGNIFICANT CHANGE UP (ref 0–6)
ERYTHROCYTE [SEDIMENTATION RATE] IN BLOOD: 127 MM/HR — HIGH
FERRITIN SERPL-MCNC: 3836 NG/ML — HIGH (ref 30–400)
FIBRINOGEN PPP-MCNC: 789 MG/DL — HIGH (ref 258–438)
GAS PNL BLDA: SIGNIFICANT CHANGE UP
GAS PNL BLDA: SIGNIFICANT CHANGE UP
GLUCOSE SERPL-MCNC: 130 MG/DL — HIGH (ref 70–99)
HCO3 BLDA-SCNC: 23 MMOL/L — SIGNIFICANT CHANGE UP (ref 21–28)
HCT VFR BLD CALC: 33 % — LOW (ref 39–50)
HGB BLD-MCNC: 11.3 G/DL — LOW (ref 13–17)
LDH SERPL L TO P-CCNC: 682 U/L — HIGH (ref 50–242)
LYMPHOCYTES # BLD AUTO: 1.16 K/UL — SIGNIFICANT CHANGE UP (ref 1–3.3)
LYMPHOCYTES # BLD AUTO: 5.2 % — LOW (ref 13–44)
MAGNESIUM SERPL-MCNC: 2.6 MG/DL — SIGNIFICANT CHANGE UP (ref 1.6–2.6)
MCHC RBC-ENTMCNC: 30.5 PG — SIGNIFICANT CHANGE UP (ref 27–34)
MCHC RBC-ENTMCNC: 34.2 GM/DL — SIGNIFICANT CHANGE UP (ref 32–36)
MCV RBC AUTO: 88.9 FL — SIGNIFICANT CHANGE UP (ref 80–100)
MONOCYTES # BLD AUTO: 0 K/UL — SIGNIFICANT CHANGE UP (ref 0–0.9)
MONOCYTES NFR BLD AUTO: 0 % — LOW (ref 2–14)
NEUTROPHILS # BLD AUTO: 19.84 K/UL — HIGH (ref 1.8–7.4)
NEUTROPHILS NFR BLD AUTO: 87 % — HIGH (ref 43–77)
NT-PROBNP SERPL-SCNC: 383 PG/ML — HIGH (ref 0–300)
PCO2 BLDA: 29 MMHG — LOW (ref 35–48)
PH BLDA: 7.52 — HIGH (ref 7.35–7.45)
PHOSPHATE SERPL-MCNC: 2.5 MG/DL — SIGNIFICANT CHANGE UP (ref 2.5–4.5)
PLATELET # BLD AUTO: 301 K/UL — SIGNIFICANT CHANGE UP (ref 150–400)
PO2 BLDA: 54 MMHG — CRITICAL LOW (ref 83–108)
POTASSIUM SERPL-MCNC: 4.3 MMOL/L — SIGNIFICANT CHANGE UP (ref 3.5–5.3)
POTASSIUM SERPL-SCNC: 4.3 MMOL/L — SIGNIFICANT CHANGE UP (ref 3.5–5.3)
PROCALCITONIN SERPL-MCNC: 0.33 NG/ML — HIGH (ref 0.02–0.1)
PROT SERPL-MCNC: 6.4 G/DL — SIGNIFICANT CHANGE UP (ref 6–8.3)
RBC # BLD: 3.71 M/UL — LOW (ref 4.2–5.8)
RBC # FLD: 13.9 % — SIGNIFICANT CHANGE UP (ref 10.3–14.5)
SAO2 % BLDA: 90 % — LOW (ref 95–100)
SODIUM SERPL-SCNC: 143 MMOL/L — SIGNIFICANT CHANGE UP (ref 135–145)
TROPONIN T SERPL-MCNC: <0.01 NG/ML — SIGNIFICANT CHANGE UP (ref 0–0.01)
WBC # BLD: 22.37 K/UL — HIGH (ref 3.8–10.5)
WBC # FLD AUTO: 22.37 K/UL — HIGH (ref 3.8–10.5)

## 2020-04-08 PROCEDURE — 71045 X-RAY EXAM CHEST 1 VIEW: CPT | Mod: 26,77

## 2020-04-08 PROCEDURE — 71045 X-RAY EXAM CHEST 1 VIEW: CPT | Mod: 26

## 2020-04-08 RX ORDER — CISATRACURIUM BESYLATE 2 MG/ML
10 INJECTION INTRAVENOUS ONCE
Refills: 0 | Status: COMPLETED | OUTPATIENT
Start: 2020-04-08 | End: 2020-04-08

## 2020-04-08 RX ORDER — VANCOMYCIN HCL 1 G
750 VIAL (EA) INTRAVENOUS ONCE
Refills: 0 | Status: COMPLETED | OUTPATIENT
Start: 2020-04-08 | End: 2020-04-08

## 2020-04-08 RX ORDER — PIPERACILLIN AND TAZOBACTAM 4; .5 G/20ML; G/20ML
3.38 INJECTION, POWDER, LYOPHILIZED, FOR SOLUTION INTRAVENOUS ONCE
Refills: 0 | Status: COMPLETED | OUTPATIENT
Start: 2020-04-08 | End: 2020-04-08

## 2020-04-08 RX ORDER — PROPOFOL 10 MG/ML
5 INJECTION, EMULSION INTRAVENOUS
Qty: 1000 | Refills: 0 | Status: DISCONTINUED | OUTPATIENT
Start: 2020-04-08 | End: 2020-04-15

## 2020-04-08 RX ORDER — VANCOMYCIN HCL 1 G
750 VIAL (EA) INTRAVENOUS EVERY 12 HOURS
Refills: 0 | Status: DISCONTINUED | OUTPATIENT
Start: 2020-04-09 | End: 2020-04-10

## 2020-04-08 RX ORDER — FUROSEMIDE 40 MG
20 TABLET ORAL ONCE
Refills: 0 | Status: COMPLETED | OUTPATIENT
Start: 2020-04-08 | End: 2020-04-08

## 2020-04-08 RX ORDER — HYDRALAZINE HCL 50 MG
5 TABLET ORAL ONCE
Refills: 0 | Status: COMPLETED | OUTPATIENT
Start: 2020-04-08 | End: 2020-04-08

## 2020-04-08 RX ORDER — VANCOMYCIN HCL 1 G
VIAL (EA) INTRAVENOUS
Refills: 0 | Status: DISCONTINUED | OUTPATIENT
Start: 2020-04-08 | End: 2020-04-10

## 2020-04-08 RX ORDER — PIPERACILLIN AND TAZOBACTAM 4; .5 G/20ML; G/20ML
3.38 INJECTION, POWDER, LYOPHILIZED, FOR SOLUTION INTRAVENOUS EVERY 6 HOURS
Refills: 0 | Status: DISCONTINUED | OUTPATIENT
Start: 2020-04-08 | End: 2020-04-11

## 2020-04-08 RX ORDER — ACETAMINOPHEN 500 MG
650 TABLET ORAL EVERY 6 HOURS
Refills: 0 | Status: DISCONTINUED | OUTPATIENT
Start: 2020-04-08 | End: 2020-04-15

## 2020-04-08 RX ADMIN — Medication 40 MILLIGRAM(S): at 05:34

## 2020-04-08 RX ADMIN — Medication 400 MILLIGRAM(S): at 08:08

## 2020-04-08 RX ADMIN — Medication 5 MILLIGRAM(S): at 19:16

## 2020-04-08 RX ADMIN — FAMOTIDINE 20 MILLIGRAM(S): 10 INJECTION INTRAVENOUS at 17:00

## 2020-04-08 RX ADMIN — DEXMEDETOMIDINE HYDROCHLORIDE IN 0.9% SODIUM CHLORIDE 1 MICROGRAM(S)/KG/HR: 4 INJECTION INTRAVENOUS at 14:38

## 2020-04-08 RX ADMIN — PROPOFOL 1.8 MICROGRAM(S)/KG/MIN: 10 INJECTION, EMULSION INTRAVENOUS at 14:39

## 2020-04-08 RX ADMIN — PIPERACILLIN AND TAZOBACTAM 200 GRAM(S): 4; .5 INJECTION, POWDER, LYOPHILIZED, FOR SOLUTION INTRAVENOUS at 17:01

## 2020-04-08 RX ADMIN — CISATRACURIUM BESYLATE 10 MILLIGRAM(S): 2 INJECTION INTRAVENOUS at 21:47

## 2020-04-08 RX ADMIN — ENOXAPARIN SODIUM 40 MILLIGRAM(S): 100 INJECTION SUBCUTANEOUS at 05:34

## 2020-04-08 RX ADMIN — DEXMEDETOMIDINE HYDROCHLORIDE IN 0.9% SODIUM CHLORIDE 1 MICROGRAM(S)/KG/HR: 4 INJECTION INTRAVENOUS at 19:15

## 2020-04-08 RX ADMIN — FAMOTIDINE 20 MILLIGRAM(S): 10 INJECTION INTRAVENOUS at 05:34

## 2020-04-08 RX ADMIN — QUETIAPINE FUMARATE 50 MILLIGRAM(S): 200 TABLET, FILM COATED ORAL at 12:38

## 2020-04-08 RX ADMIN — Medication 5 MILLIGRAM(S): at 01:29

## 2020-04-08 RX ADMIN — Medication 250 MILLIGRAM(S): at 17:20

## 2020-04-08 RX ADMIN — Medication 20 MILLIGRAM(S): at 10:45

## 2020-04-08 RX ADMIN — CLOPIDOGREL BISULFATE 75 MILLIGRAM(S): 75 TABLET, FILM COATED ORAL at 12:38

## 2020-04-08 RX ADMIN — CHLORHEXIDINE GLUCONATE 15 MILLILITER(S): 213 SOLUTION TOPICAL at 17:00

## 2020-04-08 RX ADMIN — CHLORHEXIDINE GLUCONATE 15 MILLILITER(S): 213 SOLUTION TOPICAL at 05:13

## 2020-04-08 RX ADMIN — PROPOFOL 1.8 MICROGRAM(S)/KG/MIN: 10 INJECTION, EMULSION INTRAVENOUS at 19:16

## 2020-04-08 NOTE — PROGRESS NOTE ADULT - SUBJECTIVE AND OBJECTIVE BOX
Patient Discussed on Morning Rounds with Dr. Soliz    Primary Diagnosis: COVID Pneumonia    SUBJECTIVE:  75yMale intubated and sedated on 9Ea.   Sedation: fentanyl, propofol, precedex  Vent settings: 60%, , RR 30, PEEP 10    Interval Events:  T max overnight 102 this AM    OBJECTIVE:  Vitals: ICU Vital Signs Last 24 Hrs  T(C): 38.9 (08 Apr 2020 05:00), Max: 38.9 (08 Apr 2020 05:00)  T(F): 102 (08 Apr 2020 05:00), Max: 102 (08 Apr 2020 05:00)  HR: 81 (08 Apr 2020 07:00) (52 - 130)  BP: 176/127 (07 Apr 2020 15:00) (176/127 - 176/127)  BP(mean): 136 (07 Apr 2020 15:00) (136 - 136)  ABP: 147/68 (08 Apr 2020 07:00) (104/59 - 180/83)  ABP(mean): 100 (08 Apr 2020 07:00) (78 - 122)  RR: 30 (08 Apr 2020 07:00) (30 - 33)  SpO2: 94% (08 Apr 2020 07:00) (89% - 100%)    I&O:  I&O's Detail    07 Apr 2020 07:01  -  08 Apr 2020 07:00  --------------------------------------------------------  IN:    cisatracurium Infusion: 30 mL    dexmedetomidine Infusion: 82.9 mL    fentaNYL Infusion.: 73.5 mL    fentaNYL Infusion.: 74.5 mL    ns in tub fed  [id number]: 260 mL    Oral Fluid: 120 mL    propofol Infusion: 274.7 mL  Total IN: 915.6 mL    OUT:    Indwelling Catheter - Urethral: 1240 mL  Total OUT: 1240 mL    Total NET: -324.4 mL        VENTILATOR SETTINGS:  Mode: AC/ CMV (Assist Control/ Continuous Mandatory Ventilation)  RR (machine): 30  TV (machine): 400  FiO2: 40  PEEP: 10  ITime: 1  MAP: 11  PIP: 14    ABG - ( 08 Apr 2020 05:50 )  pH, Arterial: 7.53  pH, Blood: x     /  pCO2: 29    /  pO2: 68    / HCO3: 23    / Base Excess: 1.4   /  SaO2: 94                  PHYSICAL EXAM: Physical Exam performed by Intensivist to minimize risk of exposure.  Please refer to Attending Attestation for comprehensive exam.   General: No acute distress  Neuro: [Sedated]  CV: RRR  Pulm: [INTUBATED]  : [Villa]  Psych: affect appropriate    LABS:                         11.3   22.37 )-----------( 301      ( 08 Apr 2020 02:56 )             33.0   04-08    143  |  107  |  40<H>  ----------------------------<  130<H>  4.3   |  22  |  0.86    Ca    8.2<L>      08 Apr 2020 03:00  Phos  2.5     04-08  Mg     2.6     04-08    TPro  6.4  /  Alb  2.5<L>  /  TBili  0.8  /  DBili  x   /  AST  139<H>  /  ALT  167<H>  /  AlkPhos  116  04-08    Troponin T, Serum: <0.01 ng/mL (04-08-20 @ 03:00)  Sedimentation Rate, Erythrocyte: 127 mm/Hr (04-08-20 @ 02:56)  Ferritin, Serum: 3836 ng/mL (04-08-20 @ 02:56)  D-Dimer Assay, Quantitative: 1199 ng/mL DDU (04-08-20 @ 02:56)  ABG - ( 08 Apr 2020 05:50 )  pH, Arterial: 7.53  pH, Blood: x     /  pCO2: 29    /  pO2: 68    / HCO3: 23    / Base Excess: 1.4   /  SaO2: 94                CAPILLARY BLOOD GLUCOSE      POCT Blood Glucose.: 84 mg/dL (08 Apr 2020 05:23)  POCT Blood Glucose.: 115 mg/dL (07 Apr 2020 21:19)  POCT Blood Glucose.: 85 mg/dL (07 Apr 2020 17:47)  POCT Blood Glucose.: 148 mg/dL (07 Apr 2020 11:17)     MEDICATIONS  (STANDING):  aspirin enteric coated 81 milliGRAM(s) Oral daily  chlorhexidine 0.12% Liquid 15 milliLiter(s) Oral Mucosa every 12 hours  clopidogrel Tablet 75 milliGRAM(s) Oral daily  dexMEDEtomidine Infusion 0.067 MICROgram(s)/kG/Hr (1 mL/Hr) IV Continuous <Continuous>  dextrose 5%. 1000 milliLiter(s) (50 mL/Hr) IV Continuous <Continuous>  dextrose 50% Injectable 12.5 Gram(s) IV Push once  dextrose 50% Injectable 25 Gram(s) IV Push once  dextrose 50% Injectable 25 Gram(s) IV Push once  enoxaparin Injectable 40 milliGRAM(s) SubCutaneous every 24 hours  famotidine Injectable 20 milliGRAM(s) IV Push every 12 hours  fentaNYL   Infusion. 0.5 MICROgram(s)/kG/Hr (3 mL/Hr) IV Continuous <Continuous>  insulin lispro (HumaLOG) corrective regimen sliding scale   SubCutaneous every 6 hours  propofol Infusion 33.333 MICROgram(s)/kG/Min (12 mL/Hr) IV Continuous <Continuous>  QUEtiapine 50 milliGRAM(s) Oral daily    MEDICATIONS  (PRN):  ALBUTerol    90 MICROgram(s) HFA Inhaler 2 Puff(s) Inhalation every 4 hours PRN Shortness of Breath and/or Wheezing  dextrose 40% Gel 15 Gram(s) Oral once PRN Blood Glucose LESS THAN 70 milliGRAM(s)/deciliter  glucagon  Injectable 1 milliGRAM(s) IntraMuscular once PRN Glucose LESS THAN 70 milligrams/deciliter      RADIOLOGY/OTHER STUDIES: Patient Discussed on Morning Rounds with Dr. Soliz    Primary Diagnosis: COVID Pneumonia    SUBJECTIVE:  75yMale intubated and sedated on 9Ea.   Sedation: fentanyl, propofol, precedex  Vent settings: 60%, , RR 30, PEEP 10    Interval Events:  T max overnight 102 this AM    OBJECTIVE:  Vitals: ICU Vital Signs Last 24 Hrs  T(C): 38.9 (08 Apr 2020 05:00), Max: 38.9 (08 Apr 2020 05:00)  T(F): 102 (08 Apr 2020 05:00), Max: 102 (08 Apr 2020 05:00)  HR: 81 (08 Apr 2020 07:00) (52 - 130)  BP: 176/127 (07 Apr 2020 15:00) (176/127 - 176/127)  BP(mean): 136 (07 Apr 2020 15:00) (136 - 136)  ABP: 147/68 (08 Apr 2020 07:00) (104/59 - 180/83)  ABP(mean): 100 (08 Apr 2020 07:00) (78 - 122)  RR: 30 (08 Apr 2020 07:00) (30 - 33)  SpO2: 94% (08 Apr 2020 07:00) (89% - 100%)    I&O:  I&O's Detail    07 Apr 2020 07:01  -  08 Apr 2020 07:00  --------------------------------------------------------  IN:    cisatracurium Infusion: 30 mL    dexmedetomidine Infusion: 82.9 mL    fentaNYL Infusion.: 73.5 mL    fentaNYL Infusion.: 74.5 mL    ns in tub fed  [id number]: 260 mL    Oral Fluid: 120 mL    propofol Infusion: 274.7 mL  Total IN: 915.6 mL    OUT:    Indwelling Catheter - Urethral: 1240 mL  Total OUT: 1240 mL    Total NET: -324.4 mL        VENTILATOR SETTINGS:  Mode: AC/ CMV (Assist Control/ Continuous Mandatory Ventilation)  RR (machine): 30  TV (machine): 400  FiO2: 40  PEEP: 10  ITime: 1  MAP: 11  PIP: 14    ABG - ( 08 Apr 2020 05:50 )  pH, Arterial: 7.53  pH, Blood: x     /  pCO2: 29    /  pO2: 68    / HCO3: 23    / Base Excess: 1.4   /  SaO2: 94            PHYSICAL EXAM: Physical Exam performed by Intensivist to minimize risk of exposure.  Please refer to Attending Attestation for comprehensive exam.   General: No acute distress  Neuro: [Sedated]  CV: RRR  Pulm: [INTUBATED]  : [Villa]  Psych: affect appropriate    LABS:                         11.3   22.37 )-----------( 301      ( 08 Apr 2020 02:56 )             33.0   04-08    143  |  107  |  40<H>  ----------------------------<  130<H>  4.3   |  22  |  0.86    Ca    8.2<L>      08 Apr 2020 03:00  Phos  2.5     04-08  Mg     2.6     04-08    TPro  6.4  /  Alb  2.5<L>  /  TBili  0.8  /  DBili  x   /  AST  139<H>  /  ALT  167<H>  /  AlkPhos  116  04-08    Troponin T, Serum: <0.01 ng/mL (04-08-20 @ 03:00)  Sedimentation Rate, Erythrocyte: 127 mm/Hr (04-08-20 @ 02:56)  Ferritin, Serum: 3836 ng/mL (04-08-20 @ 02:56)  D-Dimer Assay, Quantitative: 1199 ng/mL DDU (04-08-20 @ 02:56)  ABG - ( 08 Apr 2020 05:50 )  pH, Arterial: 7.53  pH, Blood: x     /  pCO2: 29    /  pO2: 68    / HCO3: 23    / Base Excess: 1.4   /  SaO2: 94        CAPILLARY BLOOD GLUCOSE    POCT Blood Glucose.: 84 mg/dL (08 Apr 2020 05:23)  POCT Blood Glucose.: 115 mg/dL (07 Apr 2020 21:19)  POCT Blood Glucose.: 85 mg/dL (07 Apr 2020 17:47)  POCT Blood Glucose.: 148 mg/dL (07 Apr 2020 11:17)     MEDICATIONS  (STANDING):  aspirin enteric coated 81 milliGRAM(s) Oral daily  chlorhexidine 0.12% Liquid 15 milliLiter(s) Oral Mucosa every 12 hours  clopidogrel Tablet 75 milliGRAM(s) Oral daily  dexMEDEtomidine Infusion 0.067 MICROgram(s)/kG/Hr (1 mL/Hr) IV Continuous <Continuous>  dextrose 5%. 1000 milliLiter(s) (50 mL/Hr) IV Continuous <Continuous>  dextrose 50% Injectable 12.5 Gram(s) IV Push once  dextrose 50% Injectable 25 Gram(s) IV Push once  dextrose 50% Injectable 25 Gram(s) IV Push once  enoxaparin Injectable 40 milliGRAM(s) SubCutaneous every 24 hours  famotidine Injectable 20 milliGRAM(s) IV Push every 12 hours  fentaNYL   Infusion. 0.5 MICROgram(s)/kG/Hr (3 mL/Hr) IV Continuous <Continuous>  insulin lispro (HumaLOG) corrective regimen sliding scale   SubCutaneous every 6 hours  propofol Infusion 33.333 MICROgram(s)/kG/Min (12 mL/Hr) IV Continuous <Continuous>  QUEtiapine 50 milliGRAM(s) Oral daily    MEDICATIONS  (PRN):  ALBUTerol    90 MICROgram(s) HFA Inhaler 2 Puff(s) Inhalation every 4 hours PRN Shortness of Breath and/or Wheezing  dextrose 40% Gel 15 Gram(s) Oral once PRN Blood Glucose LESS THAN 70 milliGRAM(s)/deciliter  glucagon  Injectable 1 milliGRAM(s) IntraMuscular once PRN Glucose LESS THAN 70 milligrams/deciliter      RADIOLOGY/OTHER STUDIES:  < from: Xray Chest 1 View- PORTABLE-Routine (04.08.20 @ 04:17) >  IMPRESSION:    Lung infiltrates less conspicuous compared to prior exam 4/7/2020. Endotracheal tube tip approximately 1 cm superior to the ashkan. Right venous catheter tip at level of right atrium. No pleural effusion. No pneumothorax. Nasogastric tube courses off image into region of stomach.    Assessment:  Mr. Vazquez is a 75 y.o male with a PMHx of COPD (no hx exacerbations or intubations), CAD s/p multiple stents, hypertension who presented to [facility] ED on 4/2 due to SOB, cough, and fever for 1 week. Of note he had followed with Dr. Mera as an outpatient and completed a 5 day course of azithromycin and cefdinir prior to presentation. He was admitted for hypoxemic respiratory failure and respiratory distress and tested positive for COVID. The patient was intubated on 4/3 (current vent day 6). He completed course of plaquenil and azithromycin on 4/7. During his admission he was on vanc and zosyn for presumed HAP, now discontinued.     Plan:  1. Neurovascular: No delirium  -Intubation requiring sedation  -Drips: precedex, propofol, fentanyl   -Monitor neuro status  - Continue seroquel 50 mg daily    2. Respiratory: Acute hypoxemic respiratory failure 2/2 COVID.    -Date Intubated: 4/2  -COVID pneumonia:     -Plaquenil and azithromycin completed 4/7    -Tocilizumab 8mg/kg for one dose if approved (not given)  -Vent settings: 60%, , RR 30, PEEP 10  -Position: Supine, last proned 4/6  -CXR: stable vs. improved infiltrates today, no effusions   -Continue vent weaning as tolerated    3. Cardiovascular:   -Daily EKG's to assess for QT prolongation    -QTc: 4/8   -Drips: not requiring pressors, stable BP  -Monitor HR/BP/Tele  - Hx of CAD s/p multiple stents, continue ASA and plavix    4. GI: NPO  -Nutrition: jevity, increase rate to goal of 40 cc/hr as tolerated  -Prophylaxis: famotidine 20 mg IV q12  -C/w bowel regimen as needed    5. /Renal: +Villa  -BUN/Cr: 40/0.86  -Trend Cr on AM labs  -Monitor UOP  -Replete electrolytes as needed for goal K+ 4.0, Phos 3.0, Mg 2.0    6. ID: Sepsis 2/2 COVID   -Plaquenil 400mg BID x 2 doses then 200mg BID x 4 days: Completed 4/7  -Azithromycin 250mg QD for hospital course, max 14-day dose: Completed 4/7  -Tocilizumab 8mg/kg for one dose if approved: Not given   -WBC: 22.37, up from 16.63 yesterday    -Continue with regular surveillance labs including CBC with diff, CMP, Mg, Phos, CRP, ABG, Ferritin, CK, Triglycerides, Procalcitonin, D-Dimer, EKG  -Additional labs q3d: ESR, T-cell subset, LDH, Ferritin, CK, Troponin, Coags  -COVID isolation protocol     7. Endo:  - No known hx of diabetes or thyroid disease  - Continue MISS and to monitor finger sticks     8. Heme:   -H/H: 11.3/33.0  -Continue to monitor on daily and q3d labs as above.  -DVT ppx: Lovenox SQ and SCDs    Disposition: Full Code    Critical Care Time Spent: 70 mins Patient Discussed on Morning Rounds with Dr. Soliz    Primary Diagnosis: COVID Pneumonia    SUBJECTIVE:  75yMale intubated and sedated on 9Ea.   Sedation: fentanyl, propofol, precedex  Vent settings: 60%, , RR 30, PEEP 10    Interval Events:  T max overnight 102 this AM    OBJECTIVE:  Vitals: ICU Vital Signs Last 24 Hrs  T(C): 38.9 (08 Apr 2020 05:00), Max: 38.9 (08 Apr 2020 05:00)  T(F): 102 (08 Apr 2020 05:00), Max: 102 (08 Apr 2020 05:00)  HR: 81 (08 Apr 2020 07:00) (52 - 130)  BP: 176/127 (07 Apr 2020 15:00) (176/127 - 176/127)  BP(mean): 136 (07 Apr 2020 15:00) (136 - 136)  ABP: 147/68 (08 Apr 2020 07:00) (104/59 - 180/83)  ABP(mean): 100 (08 Apr 2020 07:00) (78 - 122)  RR: 30 (08 Apr 2020 07:00) (30 - 33)  SpO2: 94% (08 Apr 2020 07:00) (89% - 100%)    I&O:  I&O's Detail    07 Apr 2020 07:01  -  08 Apr 2020 07:00  --------------------------------------------------------  IN:    cisatracurium Infusion: 30 mL    dexmedetomidine Infusion: 82.9 mL    fentaNYL Infusion.: 73.5 mL    fentaNYL Infusion.: 74.5 mL    ns in tub fed  [id number]: 260 mL    Oral Fluid: 120 mL    propofol Infusion: 274.7 mL  Total IN: 915.6 mL    OUT:    Indwelling Catheter - Urethral: 1240 mL  Total OUT: 1240 mL    Total NET: -324.4 mL        VENTILATOR SETTINGS:  Mode: AC/ CMV (Assist Control/ Continuous Mandatory Ventilation)  RR (machine): 30  TV (machine): 400  FiO2: 40  PEEP: 10  ITime: 1  MAP: 11  PIP: 14    ABG - ( 08 Apr 2020 05:50 )  pH, Arterial: 7.53  pH, Blood: x     /  pCO2: 29    /  pO2: 68    / HCO3: 23    / Base Excess: 1.4   /  SaO2: 94            PHYSICAL EXAM: Physical Exam performed by Intensivist to minimize risk of exposure.  Please refer to Attending Attestation for comprehensive exam.   General: No acute distress  Neuro: [Sedated]  CV: RRR  Pulm: [INTUBATED]  : [Villa]  Psych: affect appropriate    LABS:                         11.3   22.37 )-----------( 301      ( 08 Apr 2020 02:56 )             33.0   04-08    143  |  107  |  40<H>  ----------------------------<  130<H>  4.3   |  22  |  0.86    Ca    8.2<L>      08 Apr 2020 03:00  Phos  2.5     04-08  Mg     2.6     04-08    TPro  6.4  /  Alb  2.5<L>  /  TBili  0.8  /  DBili  x   /  AST  139<H>  /  ALT  167<H>  /  AlkPhos  116  04-08    Troponin T, Serum: <0.01 ng/mL (04-08-20 @ 03:00)  Sedimentation Rate, Erythrocyte: 127 mm/Hr (04-08-20 @ 02:56)  Ferritin, Serum: 3836 ng/mL (04-08-20 @ 02:56)  D-Dimer Assay, Quantitative: 1199 ng/mL DDU (04-08-20 @ 02:56)  ABG - ( 08 Apr 2020 05:50 )  pH, Arterial: 7.53  pH, Blood: x     /  pCO2: 29    /  pO2: 68    / HCO3: 23    / Base Excess: 1.4   /  SaO2: 94        CAPILLARY BLOOD GLUCOSE    POCT Blood Glucose.: 84 mg/dL (08 Apr 2020 05:23)  POCT Blood Glucose.: 115 mg/dL (07 Apr 2020 21:19)  POCT Blood Glucose.: 85 mg/dL (07 Apr 2020 17:47)  POCT Blood Glucose.: 148 mg/dL (07 Apr 2020 11:17)     MEDICATIONS  (STANDING):  aspirin enteric coated 81 milliGRAM(s) Oral daily  chlorhexidine 0.12% Liquid 15 milliLiter(s) Oral Mucosa every 12 hours  clopidogrel Tablet 75 milliGRAM(s) Oral daily  dexMEDEtomidine Infusion 0.067 MICROgram(s)/kG/Hr (1 mL/Hr) IV Continuous <Continuous>  dextrose 5%. 1000 milliLiter(s) (50 mL/Hr) IV Continuous <Continuous>  dextrose 50% Injectable 12.5 Gram(s) IV Push once  dextrose 50% Injectable 25 Gram(s) IV Push once  dextrose 50% Injectable 25 Gram(s) IV Push once  enoxaparin Injectable 40 milliGRAM(s) SubCutaneous every 24 hours  famotidine Injectable 20 milliGRAM(s) IV Push every 12 hours  fentaNYL   Infusion. 0.5 MICROgram(s)/kG/Hr (3 mL/Hr) IV Continuous <Continuous>  insulin lispro (HumaLOG) corrective regimen sliding scale   SubCutaneous every 6 hours  propofol Infusion 33.333 MICROgram(s)/kG/Min (12 mL/Hr) IV Continuous <Continuous>  QUEtiapine 50 milliGRAM(s) Oral daily    MEDICATIONS  (PRN):  ALBUTerol    90 MICROgram(s) HFA Inhaler 2 Puff(s) Inhalation every 4 hours PRN Shortness of Breath and/or Wheezing  dextrose 40% Gel 15 Gram(s) Oral once PRN Blood Glucose LESS THAN 70 milliGRAM(s)/deciliter  glucagon  Injectable 1 milliGRAM(s) IntraMuscular once PRN Glucose LESS THAN 70 milligrams/deciliter      RADIOLOGY/OTHER STUDIES:  < from: Xray Chest 1 View- PORTABLE-Routine (04.08.20 @ 04:17) >  IMPRESSION:    Lung infiltrates less conspicuous compared to prior exam 4/7/2020. Endotracheal tube tip approximately 1 cm superior to the ashkan. Right venous catheter tip at level of right atrium. No pleural effusion. No pneumothorax. Nasogastric tube courses off image into region of stomach.    Assessment:  Mr. Vazquez is a 75 y.o male with a PMHx of COPD (no hx exacerbations or intubations), CAD s/p multiple stents, hypertension who presented to [facility] ED on 4/2 due to SOB, cough, and fever for 1 week. Of note he had followed with Dr. Mera as an outpatient and completed a 5 day course of azithromycin and cefdinir prior to presentation. He was admitted for hypoxemic respiratory failure and respiratory distress and tested positive for COVID. The patient was intubated on 4/3 (current vent day 6). He completed course of plaquenil and azithromycin on 4/7. During his admission he was on vanc and zosyn for presumed HAP, now discontinued.     Plan:  1. Neurovascular: No delirium  -Intubation requiring sedation  -Drips: precedex, propofol, fentanyl   -Monitor neuro status  - Continue seroquel 50 mg daily    2. Respiratory: Acute hypoxemic respiratory failure 2/2 COVID.    -Date Intubated: 4/2  -COVID pneumonia:     -Plaquenil and azithromycin completed 4/7    -Tocilizumab 8mg/kg for one dose if approved (not given)  - Solumedrol given 4/3-4/8	  -Vent settings: 60%, , RR 30, PEEP 10  -Position: Supine, last proned 4/6  -CXR: stable vs. improved infiltrates today, no effusions   -Continue vent weaning as tolerated    3. Cardiovascular:   -Daily EKG's to assess for QT prolongation    -QTc: 4/8   -Drips: not requiring pressors, stable BP  -Monitor HR/BP/Tele  - Hx of CAD s/p multiple stents, continue ASA and plavix    4. GI: NPO  -Nutrition: jevity, increase rate to goal of 40 cc/hr as tolerated  -Prophylaxis: famotidine 20 mg IV q12  -C/w bowel regimen as needed    5. /Renal: +Villa  -BUN/Cr: 40/0.86  -Trend Cr on AM labs  -Monitor UOP  -Replete electrolytes as needed for goal K+ 4.0, Phos 3.0, Mg 2.0    6. ID: Sepsis 2/2 COVID   -Plaquenil 400mg BID x 2 doses then 200mg BID x 4 days: Completed 4/7  -Azithromycin 250mg QD for hospital course, max 14-day dose: Completed 4/7  -Tocilizumab 8mg/kg for one dose if approved: Not given   - Solumedrol given 4/3-4/8  -WBC: 22.37, up from 16.63 yesterday    -Continue with regular surveillance labs including CBC with diff, CMP, Mg, Phos, CRP, ABG, Ferritin, CK, Triglycerides, Procalcitonin, D-Dimer, EKG  -Additional labs q3d: ESR, T-cell subset, LDH, Ferritin, CK, Troponin, Coags  -COVID isolation protocol     7. Endo:  - No known hx of diabetes or thyroid disease  - Continue MISS and to monitor finger sticks     8. Heme:   -H/H: 11.3/33.0  -Continue to monitor on daily and q3d labs as above.  -DVT ppx: Lovenox SQ and SCDs    Disposition: Full Code    Critical Care Time Spent: 70 mins Patient Discussed on Morning Rounds with Dr. Soliz    Primary Diagnosis: COVID Pneumonia    SUBJECTIVE:  75yMale intubated and sedated on 9Ea.   Sedation: fentanyl, propofol, precedex  Vent settings: 60%, , RR 30, PEEP 10    Interval Events:  T max overnight 102 this AM    OBJECTIVE:  Vitals: ICU Vital Signs Last 24 Hrs  T(C): 38.9 (08 Apr 2020 05:00), Max: 38.9 (08 Apr 2020 05:00)  T(F): 102 (08 Apr 2020 05:00), Max: 102 (08 Apr 2020 05:00)  HR: 81 (08 Apr 2020 07:00) (52 - 130)  BP: 176/127 (07 Apr 2020 15:00) (176/127 - 176/127)  BP(mean): 136 (07 Apr 2020 15:00) (136 - 136)  ABP: 147/68 (08 Apr 2020 07:00) (104/59 - 180/83)  ABP(mean): 100 (08 Apr 2020 07:00) (78 - 122)  RR: 30 (08 Apr 2020 07:00) (30 - 33)  SpO2: 94% (08 Apr 2020 07:00) (89% - 100%)    I&O:  I&O's Detail    07 Apr 2020 07:01  -  08 Apr 2020 07:00  --------------------------------------------------------  IN:    cisatracurium Infusion: 30 mL    dexmedetomidine Infusion: 82.9 mL    fentaNYL Infusion.: 73.5 mL    fentaNYL Infusion.: 74.5 mL    ns in tub fed  [id number]: 260 mL    Oral Fluid: 120 mL    propofol Infusion: 274.7 mL  Total IN: 915.6 mL    OUT:    Indwelling Catheter - Urethral: 1240 mL  Total OUT: 1240 mL    Total NET: -324.4 mL        VENTILATOR SETTINGS:  Mode: AC/ CMV (Assist Control/ Continuous Mandatory Ventilation)  RR (machine): 30  TV (machine): 400  FiO2: 40  PEEP: 10  ITime: 1  MAP: 11  PIP: 14    ABG - ( 08 Apr 2020 05:50 )  pH, Arterial: 7.53  pH, Blood: x     /  pCO2: 29    /  pO2: 68    / HCO3: 23    / Base Excess: 1.4   /  SaO2: 94            PHYSICAL EXAM: Physical Exam performed by Intensivist to minimize risk of exposure.  Please refer to Attending Attestation for comprehensive exam.   General: No acute distress  Neuro: [Sedated]  CV: RRR  Pulm: [INTUBATED]  : [Villa]  Psych: affect appropriate    LABS:                         11.3   22.37 )-----------( 301      ( 08 Apr 2020 02:56 )             33.0   04-08    143  |  107  |  40<H>  ----------------------------<  130<H>  4.3   |  22  |  0.86    Ca    8.2<L>      08 Apr 2020 03:00  Phos  2.5     04-08  Mg     2.6     04-08    TPro  6.4  /  Alb  2.5<L>  /  TBili  0.8  /  DBili  x   /  AST  139<H>  /  ALT  167<H>  /  AlkPhos  116  04-08    Troponin T, Serum: <0.01 ng/mL (04-08-20 @ 03:00)  Sedimentation Rate, Erythrocyte: 127 mm/Hr (04-08-20 @ 02:56)  Ferritin, Serum: 3836 ng/mL (04-08-20 @ 02:56)  D-Dimer Assay, Quantitative: 1199 ng/mL DDU (04-08-20 @ 02:56)  ABG - ( 08 Apr 2020 05:50 )  pH, Arterial: 7.53  pH, Blood: x     /  pCO2: 29    /  pO2: 68    / HCO3: 23    / Base Excess: 1.4   /  SaO2: 94        CAPILLARY BLOOD GLUCOSE    POCT Blood Glucose.: 84 mg/dL (08 Apr 2020 05:23)  POCT Blood Glucose.: 115 mg/dL (07 Apr 2020 21:19)  POCT Blood Glucose.: 85 mg/dL (07 Apr 2020 17:47)  POCT Blood Glucose.: 148 mg/dL (07 Apr 2020 11:17)     MEDICATIONS  (STANDING):  aspirin enteric coated 81 milliGRAM(s) Oral daily  chlorhexidine 0.12% Liquid 15 milliLiter(s) Oral Mucosa every 12 hours  clopidogrel Tablet 75 milliGRAM(s) Oral daily  dexMEDEtomidine Infusion 0.067 MICROgram(s)/kG/Hr (1 mL/Hr) IV Continuous <Continuous>  dextrose 5%. 1000 milliLiter(s) (50 mL/Hr) IV Continuous <Continuous>  dextrose 50% Injectable 12.5 Gram(s) IV Push once  dextrose 50% Injectable 25 Gram(s) IV Push once  dextrose 50% Injectable 25 Gram(s) IV Push once  enoxaparin Injectable 40 milliGRAM(s) SubCutaneous every 24 hours  famotidine Injectable 20 milliGRAM(s) IV Push every 12 hours  fentaNYL   Infusion. 0.5 MICROgram(s)/kG/Hr (3 mL/Hr) IV Continuous <Continuous>  insulin lispro (HumaLOG) corrective regimen sliding scale   SubCutaneous every 6 hours  propofol Infusion 33.333 MICROgram(s)/kG/Min (12 mL/Hr) IV Continuous <Continuous>  QUEtiapine 50 milliGRAM(s) Oral daily    MEDICATIONS  (PRN):  ALBUTerol    90 MICROgram(s) HFA Inhaler 2 Puff(s) Inhalation every 4 hours PRN Shortness of Breath and/or Wheezing  dextrose 40% Gel 15 Gram(s) Oral once PRN Blood Glucose LESS THAN 70 milliGRAM(s)/deciliter  glucagon  Injectable 1 milliGRAM(s) IntraMuscular once PRN Glucose LESS THAN 70 milligrams/deciliter      RADIOLOGY/OTHER STUDIES:  < from: Xray Chest 1 View- PORTABLE-Routine (04.08.20 @ 04:17) >  IMPRESSION:    Lung infiltrates less conspicuous compared to prior exam 4/7/2020. Endotracheal tube tip approximately 1 cm superior to the ashkan. Right venous catheter tip at level of right atrium. No pleural effusion. No pneumothorax. Nasogastric tube courses off image into region of stomach.    Assessment:  Mr. Vazquez is a 75 y.o male with a PMHx of COPD (no hx exacerbations or intubations), CAD s/p multiple stents, hypertension who presented to [facility] ED on 4/2 due to SOB, cough, and fever for 1 week. Of note he had followed with Dr. Mera as an outpatient and completed a 5 day course of azithromycin and cefdinir prior to presentation. He was admitted for hypoxemic respiratory failure and respiratory distress and tested positive for COVID. The patient was intubated on 4/3 (current vent day 6). He completed course of plaquenil and azithromycin on 4/7. During his admission he was on vanc and zosyn for presumed HAP, now discontinued.     Plan:  1. Neurovascular: No delirium  -Intubation requiring sedation  -Drips: precedex, propofol, fentanyl   -Monitor neuro status  - Continue seroquel 50 mg daily    2. Respiratory: Acute hypoxemic respiratory failure 2/2 COVID.    -Date Intubated: 4/2  -COVID pneumonia:     -Plaquenil and azithromycin completed 4/7    -Tocilizumab 8mg/kg for one dose if approved (not given)  - Solumedrol given 4/3-4/8	  -Vent settings: 60%, , RR 30, PEEP 10  -Position: Supine, last proned 4/6  -CXR: stable vs. improved infiltrates today, no effusions   -Continue vent weaning as tolerated    3. Cardiovascular:   -Daily EKG's to assess for QT prolongation  -QTc: 4/8 WNL per telemetry   -Drips: not requiring pressors, stable BP  -Monitor HR/BP/Tele  - Hx of CAD s/p multiple stents, continue ASA and plavix    4. GI: NPO  -Nutrition: jevity, increase rate to goal of 40 cc/hr as tolerated  -Prophylaxis: famotidine 20 mg IV q12  -C/w bowel regimen as needed    5. /Renal: +Villa  -BUN/Cr: 40/0.86  -Trend Cr on AM labs  -Monitor UOP  -Replete electrolytes as needed for goal K+ 4.0, Phos 3.0, Mg 2.0    6. ID: Sepsis 2/2 COVID   -Plaquenil 400mg BID x 2 doses then 200mg BID x 4 days: Completed 4/7  -Azithromycin 250mg QD for hospital course, max 14-day dose: Completed 4/7  -Tocilizumab 8mg/kg for one dose if approved: Not given   - Solumedrol given 4/3-4/8  -WBC: 22.37, up from 16.63 yesterday    -Continue with regular surveillance labs including CBC with diff, CMP, Mg, Phos, CRP, ABG, Ferritin, CK, Triglycerides, Procalcitonin, D-Dimer, EKG  -Additional labs q3d: ESR, T-cell subset, LDH, Ferritin, CK, Troponin, Coags  -COVID isolation protocol     7. Endo:  - No known hx of diabetes or thyroid disease  - Continue MISS and to monitor finger sticks     8. Heme:   -H/H: 11.3/33.0  -Continue to monitor on daily and q3d labs as above.  -DVT ppx: Lovenox SQ and SCDs    Disposition: Full Code    Critical Care Time Spent: 70 mins Patient Discussed on Morning Rounds with Dr. Soliz    Primary Diagnosis: COVID Pneumonia    SUBJECTIVE:  75yMale intubated and sedated on 9Ea.   Sedation: fentanyl, propofol, precedex  Vent settings: 60%, , RR 30, PEEP 10    Interval Events:  T max 102 this AM    OBJECTIVE:  Vitals: ICU Vital Signs Last 24 Hrs  T(C): 38.9 (08 Apr 2020 05:00), Max: 38.9 (08 Apr 2020 05:00)  T(F): 102 (08 Apr 2020 05:00), Max: 102 (08 Apr 2020 05:00)  HR: 81 (08 Apr 2020 07:00) (52 - 130)  BP: 176/127 (07 Apr 2020 15:00) (176/127 - 176/127)  BP(mean): 136 (07 Apr 2020 15:00) (136 - 136)  ABP: 147/68 (08 Apr 2020 07:00) (104/59 - 180/83)  ABP(mean): 100 (08 Apr 2020 07:00) (78 - 122)  RR: 30 (08 Apr 2020 07:00) (30 - 33)  SpO2: 94% (08 Apr 2020 07:00) (89% - 100%)    I&O:  I&O's Detail    07 Apr 2020 07:01  -  08 Apr 2020 07:00  --------------------------------------------------------  IN:    cisatracurium Infusion: 30 mL    dexmedetomidine Infusion: 82.9 mL    fentaNYL Infusion.: 73.5 mL    fentaNYL Infusion.: 74.5 mL    ns in tub fed  cmalib60: 260 mL    Oral Fluid: 120 mL    propofol Infusion: 274.7 mL  Total IN: 915.6 mL    OUT:    Indwelling Catheter - Urethral: 1240 mL  Total OUT: 1240 mL    Total NET: -324.4 mL        VENTILATOR SETTINGS:  Mode: AC/ CMV (Assist Control/ Continuous Mandatory Ventilation)  RR (machine): 30  TV (machine): 400  FiO2: 40  PEEP: 10  ITime: 1  MAP: 11  PIP: 14    ABG - ( 08 Apr 2020 05:50 )  pH, Arterial: 7.53  pH, Blood: x     /  pCO2: 29    /  pO2: 68    / HCO3: 23    / Base Excess: 1.4   /  SaO2: 94            PHYSICAL EXAM: Physical Exam performed by Intensivist to minimize risk of exposure.  Please refer to Attending Attestation for comprehensive exam.   General: No acute distress  Neuro: [Sedated]  CV: RRR  Pulm: [INTUBATED]  : [Villa]  Psych: affect appropriate    LABS:                         11.3   22.37 )-----------( 301      ( 08 Apr 2020 02:56 )             33.0   04-08    143  |  107  |  40<H>  ----------------------------<  130<H>  4.3   |  22  |  0.86    Ca    8.2<L>      08 Apr 2020 03:00  Phos  2.5     04-08  Mg     2.6     04-08    TPro  6.4  /  Alb  2.5<L>  /  TBili  0.8  /  DBili  x   /  AST  139<H>  /  ALT  167<H>  /  AlkPhos  116  04-08    Troponin T, Serum: <0.01 ng/mL (04-08-20 @ 03:00)  Sedimentation Rate, Erythrocyte: 127 mm/Hr (04-08-20 @ 02:56)  Ferritin, Serum: 3836 ng/mL (04-08-20 @ 02:56)  D-Dimer Assay, Quantitative: 1199 ng/mL DDU (04-08-20 @ 02:56)  ABG - ( 08 Apr 2020 05:50 )  pH, Arterial: 7.53  pH, Blood: x     /  pCO2: 29    /  pO2: 68    / HCO3: 23    / Base Excess: 1.4   /  SaO2: 94        CAPILLARY BLOOD GLUCOSE    POCT Blood Glucose.: 84 mg/dL (08 Apr 2020 05:23)  POCT Blood Glucose.: 115 mg/dL (07 Apr 2020 21:19)  POCT Blood Glucose.: 85 mg/dL (07 Apr 2020 17:47)  POCT Blood Glucose.: 148 mg/dL (07 Apr 2020 11:17)     MEDICATIONS  (STANDING):  aspirin enteric coated 81 milliGRAM(s) Oral daily  chlorhexidine 0.12% Liquid 15 milliLiter(s) Oral Mucosa every 12 hours  clopidogrel Tablet 75 milliGRAM(s) Oral daily  dexMEDEtomidine Infusion 0.067 MICROgram(s)/kG/Hr (1 mL/Hr) IV Continuous <Continuous>  dextrose 5%. 1000 milliLiter(s) (50 mL/Hr) IV Continuous <Continuous>  dextrose 50% Injectable 12.5 Gram(s) IV Push once  dextrose 50% Injectable 25 Gram(s) IV Push once  dextrose 50% Injectable 25 Gram(s) IV Push once  enoxaparin Injectable 40 milliGRAM(s) SubCutaneous every 24 hours  famotidine Injectable 20 milliGRAM(s) IV Push every 12 hours  fentaNYL   Infusion. 0.5 MICROgram(s)/kG/Hr (3 mL/Hr) IV Continuous <Continuous>  insulin lispro (HumaLOG) corrective regimen sliding scale   SubCutaneous every 6 hours  propofol Infusion 33.333 MICROgram(s)/kG/Min (12 mL/Hr) IV Continuous <Continuous>  QUEtiapine 50 milliGRAM(s) Oral daily    MEDICATIONS  (PRN):  ALBUTerol    90 MICROgram(s) HFA Inhaler 2 Puff(s) Inhalation every 4 hours PRN Shortness of Breath and/or Wheezing  dextrose 40% Gel 15 Gram(s) Oral once PRN Blood Glucose LESS THAN 70 milliGRAM(s)/deciliter  glucagon  Injectable 1 milliGRAM(s) IntraMuscular once PRN Glucose LESS THAN 70 milligrams/deciliter      RADIOLOGY/OTHER STUDIES:  < from: Xray Chest 1 View- PORTABLE-Routine (04.08.20 @ 04:17) >  IMPRESSION:    Lung infiltrates less conspicuous compared to prior exam 4/7/2020. Endotracheal tube tip approximately 1 cm superior to the [place]. Right venous catheter tip at level of right atrium. No pleural effusion. No pneumothorax. Nasogastric tube courses off image into region of stomach.    Assessment:  Mr. Vazquez is a 75 y.o male with a PMHx of COPD (no hx exacerbations or intubations), CAD s/p multiple stents, hypertension who presented to [facility] ED on 4/2 due to SOB, cough, and fever for 1 week. Of note he had followed with Dr. Mera as an outpatient and completed a 5 day course of azithromycin and cefdinir prior to presentation. He was admitted for hypoxemic respiratory failure and respiratory distress and tested positive for COVID. The patient was intubated on 4/3 (current vent day 6). He completed course of plaquenil and azithromycin on 4/7. During his admission he was on vanc and zosyn for presumed HAP, now discontinued.     Plan:  1. Neurovascular: No delirium  -Intubation requiring sedation  -Drips: precedex, propofol, fentanyl   -Monitor neuro status  - Continue seroquel 50 mg daily    2. Respiratory: Acute hypoxemic respiratory failure 2/2 COVID.    -Date Intubated: 4/2  -COVID pneumonia:     -Plaquenil and azithromycin completed 4/7    -Tocilizumab 8mg/kg for one dose if approved (not given)  - Solumedrol given 4/3-4/8	  -Vent settings: 60%, , RR 30, PEEP 10  -Position: Supine, last proned 4/6  -CXR: stable vs. improved infiltrates today, no effusions   -Continue vent weaning as tolerated    3. Cardiovascular:   -Daily EKG's to assess for QT prolongation  -QTc: 4/8 WNL per telemetry   -Drips: not requiring pressors, stable BP  -Monitor HR/BP/Tele  - Hx of CAD s/p multiple stents, continue ASA and plavix    4. GI: NPO  -Nutrition: jevity, increase rate to goal of 40 cc/hr as tolerated  -Prophylaxis: famotidine 20 mg IV q12  -C/w bowel regimen as needed    5. /Renal: +Villa  -BUN/Cr: 40/0.86  -Trend Cr on AM labs  -Monitor UOP  -Replete electrolytes as needed for goal K+ 4.0, Phos 3.0, Mg 2.0    6. ID: Sepsis 2/2 COVID   -Plaquenil 400mg BID x 2 doses then 200mg BID x 4 days: Completed 4/7  -Azithromycin 250mg QD for hospital course, max 14-day dose: Completed 4/7  -Tocilizumab 8mg/kg for one dose if approved: Not given   - Solumedrol given 4/3-4/8  -WBC: 22.37, up from 16.63 yesterday    -Continue with regular surveillance labs including CBC with diff, CMP, Mg, Phos, CRP, ABG, Ferritin, CK, Triglycerides, Procalcitonin, D-Dimer, EKG  -Additional labs q3d: ESR, T-cell subset, LDH, Ferritin, CK, Troponin, Coags  -COVID isolation protocol     7. Endo:  - No known hx of diabetes or thyroid disease  - Continue MISS and to monitor finger sticks     8. Heme:   -H/H: 11.3/33.0  -Continue to monitor on daily and q3d labs as above.  -DVT ppx: Lovenox SQ and SCDs    Disposition: Full Code    Critical Care Time Spent: 70 mins Patient Discussed on Morning Rounds with Dr. Soliz    Primary Diagnosis: COVID Pneumonia    SUBJECTIVE:  75yMale intubated and sedated on 9Ea.   Sedation: fentanyl, propofol, precedex  Vent settings: 60%, , RR 30, PEEP 10    Interval Events:  T max 102 this AM    OBJECTIVE:  Vitals: ICU Vital Signs Last 24 Hrs  T(C): 38.9 (08 Apr 2020 05:00), Max: 38.9 (08 Apr 2020 05:00)  T(F): 102 (08 Apr 2020 05:00), Max: 102 (08 Apr 2020 05:00)  HR: 81 (08 Apr 2020 07:00) (52 - 130)  BP: 176/127 (07 Apr 2020 15:00) (176/127 - 176/127)  BP(mean): 136 (07 Apr 2020 15:00) (136 - 136)  ABP: 147/68 (08 Apr 2020 07:00) (104/59 - 180/83)  ABP(mean): 100 (08 Apr 2020 07:00) (78 - 122)  RR: 30 (08 Apr 2020 07:00) (30 - 33)  SpO2: 94% (08 Apr 2020 07:00) (89% - 100%)    I&O:  I&O's Detail    07 Apr 2020 07:01  -  08 Apr 2020 07:00  --------------------------------------------------------  IN:    cisatracurium Infusion: 30 mL    dexmedetomidine Infusion: 82.9 mL    fentaNYL Infusion.: 73.5 mL    fentaNYL Infusion.: 74.5 mL    ns in tub fed  ohvisl05: 260 mL    Oral Fluid: 120 mL    propofol Infusion: 274.7 mL  Total IN: 915.6 mL    OUT:    Indwelling Catheter - Urethral: 1240 mL  Total OUT: 1240 mL    Total NET: -324.4 mL        VENTILATOR SETTINGS:  Mode: AC/ CMV (Assist Control/ Continuous Mandatory Ventilation)  RR (machine): 30  TV (machine): 400  FiO2: 40  PEEP: 10  ITime: 1  MAP: 11  PIP: 14    ABG - ( 08 Apr 2020 05:50 )  pH, Arterial: 7.53  pH, Blood: x     /  pCO2: 29    /  pO2: 68    / HCO3: 23    / Base Excess: 1.4   /  SaO2: 94            PHYSICAL EXAM:  General: No acute distress, intubated in 9E ICU  Neuro: Sedated  CV: RRR  Pulm: Lung sounds difficult to appreciate on exam  : Villa in place  GI: NT abdomen  HEENT: Blisters noted on right ear lobe     LABS:                         11.3   22.37 )-----------( 301      ( 08 Apr 2020 02:56 )             33.0   04-08    143  |  107  |  40<H>  ----------------------------<  130<H>  4.3   |  22  |  0.86    Ca    8.2<L>      08 Apr 2020 03:00  Phos  2.5     04-08  Mg     2.6     04-08    TPro  6.4  /  Alb  2.5<L>  /  TBili  0.8  /  DBili  x   /  AST  139<H>  /  ALT  167<H>  /  AlkPhos  116  04-08    Troponin T, Serum: <0.01 ng/mL (04-08-20 @ 03:00)  Sedimentation Rate, Erythrocyte: 127 mm/Hr (04-08-20 @ 02:56)  Ferritin, Serum: 3836 ng/mL (04-08-20 @ 02:56)  D-Dimer Assay, Quantitative: 1199 ng/mL DDU (04-08-20 @ 02:56)  ABG - ( 08 Apr 2020 05:50 )  pH, Arterial: 7.53  pH, Blood: x     /  pCO2: 29    /  pO2: 68    / HCO3: 23    / Base Excess: 1.4   /  SaO2: 94        CAPILLARY BLOOD GLUCOSE    POCT Blood Glucose.: 84 mg/dL (08 Apr 2020 05:23)  POCT Blood Glucose.: 115 mg/dL (07 Apr 2020 21:19)  POCT Blood Glucose.: 85 mg/dL (07 Apr 2020 17:47)  POCT Blood Glucose.: 148 mg/dL (07 Apr 2020 11:17)     MEDICATIONS  (STANDING):  aspirin enteric coated 81 milliGRAM(s) Oral daily  chlorhexidine 0.12% Liquid 15 milliLiter(s) Oral Mucosa every 12 hours  clopidogrel Tablet 75 milliGRAM(s) Oral daily  dexMEDEtomidine Infusion 0.067 MICROgram(s)/kG/Hr (1 mL/Hr) IV Continuous <Continuous>  dextrose 5%. 1000 milliLiter(s) (50 mL/Hr) IV Continuous <Continuous>  dextrose 50% Injectable 12.5 Gram(s) IV Push once  dextrose 50% Injectable 25 Gram(s) IV Push once  dextrose 50% Injectable 25 Gram(s) IV Push once  enoxaparin Injectable 40 milliGRAM(s) SubCutaneous every 24 hours  famotidine Injectable 20 milliGRAM(s) IV Push every 12 hours  fentaNYL   Infusion. 0.5 MICROgram(s)/kG/Hr (3 mL/Hr) IV Continuous <Continuous>  insulin lispro (HumaLOG) corrective regimen sliding scale   SubCutaneous every 6 hours  propofol Infusion 33.333 MICROgram(s)/kG/Min (12 mL/Hr) IV Continuous <Continuous>  QUEtiapine 50 milliGRAM(s) Oral daily    MEDICATIONS  (PRN):  ALBUTerol    90 MICROgram(s) HFA Inhaler 2 Puff(s) Inhalation every 4 hours PRN Shortness of Breath and/or Wheezing  dextrose 40% Gel 15 Gram(s) Oral once PRN Blood Glucose LESS THAN 70 milliGRAM(s)/deciliter  glucagon  Injectable 1 milliGRAM(s) IntraMuscular once PRN Glucose LESS THAN 70 milligrams/deciliter      RADIOLOGY/OTHER STUDIES:  < from: Xray Chest 1 View- PORTABLE-Routine (04.08.20 @ 04:17) >  IMPRESSION:    Lung infiltrates less conspicuous compared to prior exam 4/7/2020. Endotracheal tube tip approximately 1 cm superior to the [place]. Right venous catheter tip at level of right atrium. No pleural effusion. No pneumothorax. Nasogastric tube courses off image into region of stomach.    Assessment:  Mr. Vazquez is a 75 y.o male with a PMHx of COPD (no hx exacerbations or intubations), CAD s/p multiple stents, hypertension who presented to [facility] ED on 4/2 due to SOB, cough, and fever for 1 week. Of note he had followed with Dr. Mera as an outpatient and completed a 5 day course of azithromycin and cefdinir prior to presentation. He was admitted for hypoxemic respiratory failure and respiratory distress and tested positive for COVID. The patient was intubated on 4/3 (current vent day 6). He completed course of plaquenil and azithromycin on 4/7. During his admission he was on vanc and zosyn for presumed HAP, now discontinued.     Plan:  1. Neurovascular: No delirium  -Intubation requiring sedation  -Drips: precedex, propofol, fentanyl   -Monitor neuro status  - Continue seroquel 50 mg daily    2. Respiratory: Acute hypoxemic respiratory failure 2/2 COVID.    -Date Intubated: 4/2  -COVID pneumonia:     -Plaquenil and azithromycin completed 4/7    -Tocilizumab 8mg/kg for one dose if approved (not given)  - Solumedrol given 4/3-4/8	  -Vent settings: 60%, , RR 30, PEEP 10  -Position: Supine, last proned 4/6  -CXR: stable vs. improved infiltrates today, no effusions   -Continue vent weaning as tolerated    3. Cardiovascular:   -Daily EKG's to assess for QT prolongation  -QTc: 4/8 WNL per telemetry   -Drips: not requiring pressors, stable BP  -Monitor HR/BP/Tele  - Hx of CAD s/p multiple stents, continue ASA and plavix    4. GI: NPO  -Nutrition: jevity, increase rate to goal of 40 cc/hr as tolerated  -Prophylaxis: famotidine 20 mg IV q12  -C/w bowel regimen as needed    5. /Renal: +Villa  -BUN/Cr: 40/0.86  -Trend Cr on AM labs  -Monitor UOP  -Replete electrolytes as needed for goal K+ 4.0, Phos 3.0, Mg 2.0    6. ID: Sepsis 2/2 COVID   -Plaquenil 400mg BID x 2 doses then 200mg BID x 4 days: Completed 4/7  -Azithromycin 250mg QD for hospital course, max 14-day dose: Completed 4/7  -Tocilizumab 8mg/kg for one dose if approved: Not given   - Solumedrol given 4/3-4/8  -WBC: 22.37, up from 16.63 yesterday    -Continue with regular surveillance labs including CBC with diff, CMP, Mg, Phos, CRP, ABG, Ferritin, CK, Triglycerides, Procalcitonin, D-Dimer, EKG  -Additional labs q3d: ESR, T-cell subset, LDH, Ferritin, CK, Troponin, Coags  -COVID isolation protocol     7. Endo:  - No known hx of diabetes or thyroid disease  - Continue MISS and to monitor finger sticks     8. Heme:   -H/H: 11.3/33.0  -Continue to monitor on daily and q3d labs as above.  -DVT ppx: Lovenox SQ and SCDs    Disposition: Full Code    Critical Care Time Spent: 70 mins Patient Discussed on Morning Rounds with Dr. Soliz    Primary Diagnosis: COVID Pneumonia    SUBJECTIVE:  75yMale intubated and sedated on 9Ea.   Sedation: fentanyl, propofol, precedex  Vent settings: 60%, , RR 30, PEEP 10    Interval Events:  T max 102 this AM    OBJECTIVE:  Vitals: ICU Vital Signs Last 24 Hrs  T(C): 38.9 (08 Apr 2020 05:00), Max: 38.9 (08 Apr 2020 05:00)  T(F): 102 (08 Apr 2020 05:00), Max: 102 (08 Apr 2020 05:00)  HR: 81 (08 Apr 2020 07:00) (52 - 130)  BP: 176/127 (07 Apr 2020 15:00) (176/127 - 176/127)  BP(mean): 136 (07 Apr 2020 15:00) (136 - 136)  ABP: 147/68 (08 Apr 2020 07:00) (104/59 - 180/83)  ABP(mean): 100 (08 Apr 2020 07:00) (78 - 122)  RR: 30 (08 Apr 2020 07:00) (30 - 33)  SpO2: 94% (08 Apr 2020 07:00) (89% - 100%)    I&O:  I&O's Detail    07 Apr 2020 07:01  -  08 Apr 2020 07:00  --------------------------------------------------------  IN:    cisatracurium Infusion: 30 mL    dexmedetomidine Infusion: 82.9 mL    fentaNYL Infusion.: 73.5 mL    fentaNYL Infusion.: 74.5 mL    ns in tub fed  [id number]: 260 mL    Oral Fluid: 120 mL    propofol Infusion: 274.7 mL  Total IN: 915.6 mL    OUT:    Indwelling Catheter - Urethral: 1240 mL  Total OUT: 1240 mL    Total NET: -324.4 mL        VENTILATOR SETTINGS:  Mode: AC/ CMV (Assist Control/ Continuous Mandatory Ventilation)  RR (machine): 30  TV (machine): 400  FiO2: 40  PEEP: 10  ITime: 1  MAP: 11  PIP: 14    ABG - ( 08 Apr 2020 05:50 )  pH, Arterial: 7.53  pH, Blood: x     /  pCO2: 29    /  pO2: 68    / HCO3: 23    / Base Excess: 1.4   /  SaO2: 94            PHYSICAL EXAM:  General: No acute distress, intubated in 9E ICU  Neuro: Sedated  CV: RRR  Pulm: Lung sounds difficult to appreciate on exam  : Villa in place  GI: NT abdomen  HEENT: Blisters noted on right ear lobe   EXTREMITIES: No edema  VASCULAR: Bilateral distal pulses 2+    LABS:                         11.3   22.37 )-----------( 301      ( 08 Apr 2020 02:56 )             33.0   04-08    143  |  107  |  40<H>  ----------------------------<  130<H>  4.3   |  22  |  0.86    Ca    8.2<L>      08 Apr 2020 03:00  Phos  2.5     04-08  Mg     2.6     04-08    TPro  6.4  /  Alb  2.5<L>  /  TBili  0.8  /  DBili  x   /  AST  139<H>  /  ALT  167<H>  /  AlkPhos  116  04-08    Troponin T, Serum: <0.01 ng/mL (04-08-20 @ 03:00)  Sedimentation Rate, Erythrocyte: 127 mm/Hr (04-08-20 @ 02:56)  Ferritin, Serum: 3836 ng/mL (04-08-20 @ 02:56)  D-Dimer Assay, Quantitative: 1199 ng/mL DDU (04-08-20 @ 02:56)  ABG - ( 08 Apr 2020 05:50 )  pH, Arterial: 7.53  pH, Blood: x     /  pCO2: 29    /  pO2: 68    / HCO3: 23    / Base Excess: 1.4   /  SaO2: 94        CAPILLARY BLOOD GLUCOSE    POCT Blood Glucose.: 84 mg/dL (08 Apr 2020 05:23)  POCT Blood Glucose.: 115 mg/dL (07 Apr 2020 21:19)  POCT Blood Glucose.: 85 mg/dL (07 Apr 2020 17:47)  POCT Blood Glucose.: 148 mg/dL (07 Apr 2020 11:17)     MEDICATIONS  (STANDING):  aspirin enteric coated 81 milliGRAM(s) Oral daily  chlorhexidine 0.12% Liquid 15 milliLiter(s) Oral Mucosa every 12 hours  clopidogrel Tablet 75 milliGRAM(s) Oral daily  dexMEDEtomidine Infusion 0.067 MICROgram(s)/kG/Hr (1 mL/Hr) IV Continuous <Continuous>  dextrose 5%. 1000 milliLiter(s) (50 mL/Hr) IV Continuous <Continuous>  dextrose 50% Injectable 12.5 Gram(s) IV Push once  dextrose 50% Injectable 25 Gram(s) IV Push once  dextrose 50% Injectable 25 Gram(s) IV Push once  enoxaparin Injectable 40 milliGRAM(s) SubCutaneous every 24 hours  famotidine Injectable 20 milliGRAM(s) IV Push every 12 hours  fentaNYL   Infusion. 0.5 MICROgram(s)/kG/Hr (3 mL/Hr) IV Continuous <Continuous>  insulin lispro (HumaLOG) corrective regimen sliding scale   SubCutaneous every 6 hours  propofol Infusion 33.333 MICROgram(s)/kG/Min (12 mL/Hr) IV Continuous <Continuous>  QUEtiapine 50 milliGRAM(s) Oral daily    MEDICATIONS  (PRN):  ALBUTerol    90 MICROgram(s) HFA Inhaler 2 Puff(s) Inhalation every 4 hours PRN Shortness of Breath and/or Wheezing  dextrose 40% Gel 15 Gram(s) Oral once PRN Blood Glucose LESS THAN 70 milliGRAM(s)/deciliter  glucagon  Injectable 1 milliGRAM(s) IntraMuscular once PRN Glucose LESS THAN 70 milligrams/deciliter      RADIOLOGY/OTHER STUDIES:  < from: Xray Chest 1 View- PORTABLE-Routine (04.08.20 @ 04:17) >  IMPRESSION:    Lung infiltrates less conspicuous compared to prior exam 4/7/2020. Endotracheal tube tip approximately 1 cm superior to the [place]. Right venous catheter tip at level of right atrium. No pleural effusion. No pneumothorax. Nasogastric tube courses off image into region of stomach.    Assessment:  Mr. Vazquez is a 75 y.o male with a PMHx of COPD (no hx exacerbations or intubations), CAD s/p multiple stents, hypertension who presented to [facility] ED on 4/2 due to SOB, cough, and fever for 1 week. Of note he had followed with Dr. Mera as an outpatient and completed a 5 day course of azithromycin and cefdinir prior to presentation. He was admitted for hypoxemic respiratory failure and respiratory distress and tested positive for COVID. The patient was intubated on 4/3 (current vent day 6). He completed course of plaquenil and azithromycin on 4/7. During his admission he was on vanc and zosyn for presumed HAP, now discontinued.     Plan:  1. Neurovascular: No delirium  -Intubation requiring sedation  -Drips: precedex, propofol, fentanyl   -Monitor neuro status  - Continue seroquel 50 mg daily    2. Respiratory: Acute hypoxemic respiratory failure 2/2 COVID.    -Date Intubated: 4/2  -COVID pneumonia:     -Plaquenil and azithromycin completed 4/7    -Tocilizumab 8mg/kg for one dose if approved (not given)  - Solumedrol given 4/3-4/8	  -Vent settings: 60%, , RR 30, PEEP 10  -Position: Supine, last proned 4/6  -CXR: stable vs. improved infiltrates today, no effusions   -Continue vent weaning as tolerated    3. Cardiovascular:   -Daily EKG's to assess for QT prolongation  -QTc: 4/8 WNL per telemetry   -Drips: not requiring pressors, stable BP  -Monitor HR/BP/Tele  - Hx of CAD s/p multiple stents, continue ASA and plavix    4. GI: NPO  -Nutrition: jevity, increase rate to goal of 40 cc/hr as tolerated  -Prophylaxis: famotidine 20 mg IV q12  -C/w bowel regimen as needed    5. /Renal: +Villa  -BUN/Cr: 40/0.86  -Trend Cr on AM labs  -Monitor UOP  -Replete electrolytes as needed for goal K+ 4.0, Phos 3.0, Mg 2.0    6. ID: Sepsis 2/2 COVID   -Plaquenil 400mg BID x 2 doses then 200mg BID x 4 days: Completed 4/7  -Azithromycin 250mg QD for hospital course, max 14-day dose: Completed 4/7  -Tocilizumab 8mg/kg for one dose if approved: Not given   - Solumedrol given 4/3-4/8  -WBC: 22.37, up from 16.63 yesterday    -Continue with regular surveillance labs including CBC with diff, CMP, Mg, Phos, CRP, ABG, Ferritin, CK, Triglycerides, Procalcitonin, D-Dimer, EKG  -Additional labs q3d: ESR, T-cell subset, LDH, Ferritin, CK, Troponin, Coags  -COVID isolation protocol     7. Endo:  - No known hx of diabetes or thyroid disease  - Continue MISS and to monitor finger sticks     8. Heme:   -H/H: 11.3/33.0  -Continue to monitor on daily and q3d labs as above.  -DVT ppx: Lovenox SQ and SCDs    Disposition: Full Code    Critical Care Time Spent: 70 mins Patient Discussed on Morning Rounds with Dr. Soliz    Primary Diagnosis: COVID Pneumonia    SUBJECTIVE:  75yMale intubated and sedated on 9Ea.   Sedation: fentanyl, propofol, precedex  Vent settings: 100%, , RR 30, PEEP 10    Interval Events:  T max 102 this AM    OBJECTIVE:  Vitals: ICU Vital Signs Last 24 Hrs  T(C): 38.9 (08 Apr 2020 05:00), Max: 38.9 (08 Apr 2020 05:00)  T(F): 102 (08 Apr 2020 05:00), Max: 102 (08 Apr 2020 05:00)  HR: 81 (08 Apr 2020 07:00) (52 - 130)  BP: 176/127 (07 Apr 2020 15:00) (176/127 - 176/127)  BP(mean): 136 (07 Apr 2020 15:00) (136 - 136)  ABP: 147/68 (08 Apr 2020 07:00) (104/59 - 180/83)  ABP(mean): 100 (08 Apr 2020 07:00) (78 - 122)  RR: 30 (08 Apr 2020 07:00) (30 - 33)  SpO2: 94% (08 Apr 2020 07:00) (89% - 100%)    I&O:  I&O's Detail    07 Apr 2020 07:01  -  08 Apr 2020 07:00  --------------------------------------------------------  IN:    cisatracurium Infusion: 30 mL    dexmedetomidine Infusion: 82.9 mL    fentaNYL Infusion.: 73.5 mL    fentaNYL Infusion.: 74.5 mL    ns in tub fed  acwlcd11: 260 mL    Oral Fluid: 120 mL    propofol Infusion: 274.7 mL  Total IN: 915.6 mL    OUT:    Indwelling Catheter - Urethral: 1240 mL  Total OUT: 1240 mL    Total NET: -324.4 mL        VENTILATOR SETTINGS:  Mode: AC/ CMV (Assist Control/ Continuous Mandatory Ventilation)  RR (machine): 30  TV (machine): 400  FiO2: 40  PEEP: 10  ITime: 1  MAP: 11  PIP: 14    ABG - ( 08 Apr 2020 05:50 )  pH, Arterial: 7.53  pH, Blood: x     /  pCO2: 29    /  pO2: 68    / HCO3: 23    / Base Excess: 1.4   /  SaO2: 94            PHYSICAL EXAM:  General: No acute distress, intubated in 9E ICU  Neuro: Sedated  CV: RRR  Pulm: Lung sounds difficult to appreciate on exam  : Villa in place  GI: NT abdomen  HEENT: Blisters noted on right ear lobe   EXTREMITIES: No edema  VASCULAR: Bilateral distal pulses 2+    LABS:                         11.3   22.37 )-----------( 301      ( 08 Apr 2020 02:56 )             33.0   04-08    143  |  107  |  40<H>  ----------------------------<  130<H>  4.3   |  22  |  0.86    Ca    8.2<L>      08 Apr 2020 03:00  Phos  2.5     04-08  Mg     2.6     04-08    TPro  6.4  /  Alb  2.5<L>  /  TBili  0.8  /  DBili  x   /  AST  139<H>  /  ALT  167<H>  /  AlkPhos  116  04-08    Troponin T, Serum: <0.01 ng/mL (04-08-20 @ 03:00)  Sedimentation Rate, Erythrocyte: 127 mm/Hr (04-08-20 @ 02:56)  Ferritin, Serum: 3836 ng/mL (04-08-20 @ 02:56)  D-Dimer Assay, Quantitative: 1199 ng/mL DDU (04-08-20 @ 02:56)  ABG - ( 08 Apr 2020 05:50 )  pH, Arterial: 7.53  pH, Blood: x     /  pCO2: 29    /  pO2: 68    / HCO3: 23    / Base Excess: 1.4   /  SaO2: 94        CAPILLARY BLOOD GLUCOSE    POCT Blood Glucose.: 84 mg/dL (08 Apr 2020 05:23)  POCT Blood Glucose.: 115 mg/dL (07 Apr 2020 21:19)  POCT Blood Glucose.: 85 mg/dL (07 Apr 2020 17:47)  POCT Blood Glucose.: 148 mg/dL (07 Apr 2020 11:17)     MEDICATIONS  (STANDING):  aspirin enteric coated 81 milliGRAM(s) Oral daily  chlorhexidine 0.12% Liquid 15 milliLiter(s) Oral Mucosa every 12 hours  clopidogrel Tablet 75 milliGRAM(s) Oral daily  dexMEDEtomidine Infusion 0.067 MICROgram(s)/kG/Hr (1 mL/Hr) IV Continuous <Continuous>  dextrose 5%. 1000 milliLiter(s) (50 mL/Hr) IV Continuous <Continuous>  dextrose 50% Injectable 12.5 Gram(s) IV Push once  dextrose 50% Injectable 25 Gram(s) IV Push once  dextrose 50% Injectable 25 Gram(s) IV Push once  enoxaparin Injectable 40 milliGRAM(s) SubCutaneous every 24 hours  famotidine Injectable 20 milliGRAM(s) IV Push every 12 hours  fentaNYL   Infusion. 0.5 MICROgram(s)/kG/Hr (3 mL/Hr) IV Continuous <Continuous>  insulin lispro (HumaLOG) corrective regimen sliding scale   SubCutaneous every 6 hours  propofol Infusion 33.333 MICROgram(s)/kG/Min (12 mL/Hr) IV Continuous <Continuous>  QUEtiapine 50 milliGRAM(s) Oral daily    MEDICATIONS  (PRN):  ALBUTerol    90 MICROgram(s) HFA Inhaler 2 Puff(s) Inhalation every 4 hours PRN Shortness of Breath and/or Wheezing  dextrose 40% Gel 15 Gram(s) Oral once PRN Blood Glucose LESS THAN 70 milliGRAM(s)/deciliter  glucagon  Injectable 1 milliGRAM(s) IntraMuscular once PRN Glucose LESS THAN 70 milligrams/deciliter      RADIOLOGY/OTHER STUDIES:  < from: Xray Chest 1 View- PORTABLE-Routine (04.08.20 @ 04:17) >  IMPRESSION:    Lung infiltrates less conspicuous compared to prior exam 4/7/2020. Endotracheal tube tip approximately 1 cm superior to the ashkan. Right venous catheter tip at level of right atrium. No pleural effusion. No pneumothorax. Nasogastric tube courses off image into region of stomach.    Assessment:  Mr. Vazquez is a 75 y.o male with a PMHx of COPD (no hx exacerbations or intubations), CAD s/p multiple stents, hypertension who presented to [facility] ED on 4/2 due to SOB, cough, and fever for 1 week. Of note he had followed with Dr. Mera as an outpatient and completed a 5 day course of azithromycin and cefdinir prior to presentation. He was admitted for hypoxemic respiratory failure and respiratory distress and tested positive for COVID. The patient was intubated on 4/3 (current vent day 6). He completed course of plaquenil and azithromycin on 4/7. During his admission he was on vanc and zosyn for presumed HAP which was discontinued but restarted today (4/8) for empiric coverage given new fever, elevated WBC count.     Plan:  1. Neurovascular: No delirium  -Intubation requiring sedation  -Drips: precedex, propofol, fentanyl   -Monitor neuro status  - Continue seroquel 50 mg daily    2. Respiratory: Acute hypoxemic respiratory failure 2/2 COVID.    -Date Intubated: 4/2  -COVID pneumonia:     -Plaquenil and azithromycin completed 4/7    -Tocilizumab 8mg/kg for one dose if approved (not given)  - Solumedrol given 4/3-4/8	  -Vent settings: 100%, , RR 30, PEEP 10  - Increased to FIO2 100% from 60% for desaturation today   -Position: Supine, last proned 4/6  -CXR: stable vs. improved infiltrates today, no effusions   -Continue vent weaning as tolerated    3. Cardiovascular:   -Daily EKG's to assess for QT prolongation  -QTc: 4/8 WNL per telemetry   -Drips: not requiring pressors, stable BP  -Monitor HR/BP/Tele  - Hx of CAD s/p multiple stents, continue ASA and plavix  - Hx of HTN, cuff , arterial line 165, given 5 mg IV hydralazine    4. GI: NPO  -Nutrition: jevity, increase rate to goal of 40 cc/hr as tolerated  -Prophylaxis: famotidine 20 mg IV q12  -C/w bowel regimen as needed    5. /Renal: +Villa  -BUN/Cr: 40/0.86  -Trend Cr on AM labs  -Monitor UOP  -Replete electrolytes as needed for goal K+ 4.0, Phos 3.0, Mg 2.0    6. ID: Sepsis 2/2 COVID   -Plaquenil 400mg BID x 2 doses then 200mg BID x 4 days: Completed 4/7  -Azithromycin 250mg QD for hospital course, max 14-day dose: Completed 4/7  -Tocilizumab 8mg/kg for one dose if approved: Not given   - Solumedrol given 4/3-4/8  -WBC: 22.37, up from 16.63 yesterday    -Continue with regular surveillance labs including CBC with diff, CMP, Mg, Phos, CRP, ABG, Ferritin, CK, Triglycerides, Procalcitonin, D-Dimer, EKG  -Additional labs q3d: ESR, T-cell subset, LDH, Ferritin, CK, Troponin, Coags  -COVID isolation protocol     7. Endo:  - No known hx of diabetes or thyroid disease  - Continue MISS and to monitor finger sticks     8. Heme:   -H/H: 11.3/33.0  -Continue to monitor on daily and q3d labs as above.  -DVT ppx: Lovenox SQ and SCDs    Disposition: Full Code    Critical Care Time Spent: 70 mins

## 2020-04-08 NOTE — CHART NOTE - NSCHARTNOTEFT_GEN_A_CORE
Admitting Diagnosis:   Patient is a 75y old  Male who presents with a chief complaint of shortness of breath (08 Apr 2020 08:38)      PAST MEDICAL & SURGICAL HISTORY:  Essential hypertension: Hypertension  Transient ischemic attack (TIA), and cerebral infarction without residual deficits: History of CVA (cerebrovascular accident) without residual deficits  Hyperlipidemia: Hyperlipidemia  Benign prostatic hypertrophy without lower urinary tract symptoms: BPH (benign prostatic hypertrophy)  Chronic obstructive pulmonary disease: COPD (chronic obstructive pulmonary disease)  Atherosclerosis of coronary artery: Coronary artery disease  Disease of pericardium: Pericardial effusion without cardiac tamponade  Constipation: Constipation  H/O heart artery stent  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 Successful PTCA KARAN pLAD &amp; PTCA ostial D1.      Current Nutrition Order:  Jevity 1.5 Kristopher @ 40mL/hr x 24hrs via NG. Provides: 960mL TV, 1440kcal, 61g pro, 730ml free h2O, 96% RDI     PO Intake: Good (%) [   ]  Fair (50-75%) [   ] Poor (<25%) [   ]- NA NPO w/EN    GI Issues:   Unable to assess at this time 2/2 vent   Last BM recorded on 4/5- no bowel regimen ordered    Pain:  Unable to assess at this time 2/2 vent   Skin Integrity:  Ayush 9, intact pressure-wise     Labs:   04-08    143  |  107  |  40<H>  ----------------------------<  130<H>  4.3   |  22  |  0.86    Ca    8.2<L>      08 Apr 2020 03:00  Phos  2.5     04-08  Mg     2.6     04-08    TPro  6.4  /  Alb  2.5<L>  /  TBili  0.8  /  DBili  x   /  AST  139<H>  /  ALT  167<H>  /  AlkPhos  116  04-08    CAPILLARY BLOOD GLUCOSE      POCT Blood Glucose.: 131 mg/dL (08 Apr 2020 11:56)  POCT Blood Glucose.: 84 mg/dL (08 Apr 2020 05:23)  POCT Blood Glucose.: 115 mg/dL (07 Apr 2020 21:19)  POCT Blood Glucose.: 85 mg/dL (07 Apr 2020 17:47)      Medications:  MEDICATIONS  (STANDING):  aspirin enteric coated 81 milliGRAM(s) Oral daily  chlorhexidine 0.12% Liquid 15 milliLiter(s) Oral Mucosa every 12 hours  clopidogrel Tablet 75 milliGRAM(s) Oral daily  dexMEDEtomidine Infusion 0.067 MICROgram(s)/kG/Hr (1 mL/Hr) IV Continuous <Continuous>  dextrose 5%. 1000 milliLiter(s) (50 mL/Hr) IV Continuous <Continuous>  dextrose 50% Injectable 12.5 Gram(s) IV Push once  dextrose 50% Injectable 25 Gram(s) IV Push once  dextrose 50% Injectable 25 Gram(s) IV Push once  enoxaparin Injectable 40 milliGRAM(s) SubCutaneous every 24 hours  famotidine Injectable 20 milliGRAM(s) IV Push every 12 hours  fentaNYL   Infusion. 0.5 MICROgram(s)/kG/Hr (3 mL/Hr) IV Continuous <Continuous>  insulin lispro (HumaLOG) corrective regimen sliding scale   SubCutaneous every 6 hours  piperacillin/tazobactam IVPB. 3.375 Gram(s) IV Intermittent once  piperacillin/tazobactam IVPB.. 3.375 Gram(s) IV Intermittent every 6 hours  propofol Infusion 5 MICROgram(s)/kG/Min (1.8 mL/Hr) IV Continuous <Continuous>  QUEtiapine 50 milliGRAM(s) Oral daily  vancomycin  IVPB 750 milliGRAM(s) IV Intermittent once  vancomycin  IVPB        MEDICATIONS  (PRN):  acetaminophen    Suspension .. 650 milliGRAM(s) Oral every 6 hours PRN Temp greater or equal to 38C (100.4F)  ALBUTerol    90 MICROgram(s) HFA Inhaler 2 Puff(s) Inhalation every 4 hours PRN Shortness of Breath and/or Wheezing  dextrose 40% Gel 15 Gram(s) Oral once PRN Blood Glucose LESS THAN 70 milliGRAM(s)/deciliter  glucagon  Injectable 1 milliGRAM(s) IntraMuscular once PRN Glucose LESS THAN 70 milligrams/deciliter      Weight: 60kg  Daily     Daily     Weight Change: No new weights     Nutrition Focused Physical Exam: Completed [   ]  Not Pertinent [  X ]      Estimated energy needs: Height 62"; ABW 60kg; IBW 53.6kg; 112%IBW; BMI 24.2  IBW used to estimate needs 2/2 vent. needs estimated for age and adjusted for vent, COVID infection. fluids per team 2/2 respiratory distress  Calories: 25-30 kcal/kg = 7775-8180 kcal/day  Protein: 1.4-1.6 g/kg = 75-86g protein/day  Fluids per team     Subjective: 74 yo/male with PMHx COPD, HTN, CAD, presented w/SOB, cough, fever x 1 week. Septic likely 2/2 COVID and underlying bacterial pna. Pt initially on tele-unit but became tachypneic and distended while on BiPAP and was ultimately intubated for airway protection. Pt discussed w/PA. Unable to conduct a face to face interview or nutrition-focused physical exam due to limited contact restrictions related to the pt's medical condition and isolation precautions. Pt deproned on 4/7. Currently supine. Pt is intubated on VC/AC mode, sedated on propofol @ 18mL/hr (475kcal/day from lipids), fentanyl, and precedex. Also receiving seroquel. MAP 91-not requiring pressors at this time. NPO w/EN running at 10mL/hr. No BM since 4/5- not ordered for bowel regimen. Tmax 102F. Being diuresed w/lasix. All inflammatory markers uptrending. Will continue to follow per RD protocol.     Previous Nutrition Diagnosis:  Inadequate Energy Intake RT current NPO status AEB 0% of EER able to be met at this time.   Active [   ]  Resolved [ X  ]    If resolved, new PES: Increased protein-calorie needs RT increased demand for protein-calorie intake AEB ventilator, COVID infection  Goal: Pt will consistently meet % of EER via tolerated route     Recommendations:  1. At current rate of propofol, recommend changing feeds to Jevity 1.5 Kristopher @ 24mL/hr x 24hrs plus ProStat BID (200 kcal, 30g protein) via NGT. Provides: 576mL TV, 1064kcal (+475 from prop), 67g pro, 437mL free H2O. Monitor for s/s intolerance; maintain aspiration precautions at all times. Additional free H2O flushes per team   2. Monitor lytes and replete prn. POC BG q6hrs   3. Pain and bowel regimens per team     Education: NA- intubated, sedated    Risk Level: High [ X  ] Moderate [   ] Low [   ]

## 2020-04-09 LAB
ALBUMIN SERPL ELPH-MCNC: 2.1 G/DL — LOW (ref 3.3–5)
ALP SERPL-CCNC: 100 U/L — SIGNIFICANT CHANGE UP (ref 40–120)
ALT FLD-CCNC: 122 U/L — HIGH (ref 10–45)
ANION GAP SERPL CALC-SCNC: 12 MMOL/L — SIGNIFICANT CHANGE UP (ref 5–17)
AST SERPL-CCNC: 88 U/L — HIGH (ref 10–40)
BASE EXCESS BLDA CALC-SCNC: -0.9 MMOL/L — SIGNIFICANT CHANGE UP (ref -2–3)
BASE EXCESS BLDA CALC-SCNC: -2.4 MMOL/L — LOW (ref -2–3)
BASOPHILS # BLD AUTO: 0.05 K/UL — SIGNIFICANT CHANGE UP (ref 0–0.2)
BASOPHILS NFR BLD AUTO: 0.2 % — SIGNIFICANT CHANGE UP (ref 0–2)
BILIRUB SERPL-MCNC: 1.8 MG/DL — HIGH (ref 0.2–1.2)
BUN SERPL-MCNC: 41 MG/DL — HIGH (ref 7–23)
CALCIUM SERPL-MCNC: 8.1 MG/DL — LOW (ref 8.4–10.5)
CHLORIDE SERPL-SCNC: 110 MMOL/L — HIGH (ref 96–108)
CK SERPL-CCNC: 82 U/L — SIGNIFICANT CHANGE UP (ref 30–200)
CO2 SERPL-SCNC: 23 MMOL/L — SIGNIFICANT CHANGE UP (ref 22–31)
CREAT SERPL-MCNC: 0.96 MG/DL — SIGNIFICANT CHANGE UP (ref 0.5–1.3)
CRP SERPL-MCNC: 34.87 MG/DL — HIGH (ref 0–0.4)
CULTURE RESULTS: SIGNIFICANT CHANGE UP
D DIMER BLD IA.RAPID-MCNC: 1452 NG/ML DDU — HIGH
EOSINOPHIL # BLD AUTO: 0.03 K/UL — SIGNIFICANT CHANGE UP (ref 0–0.5)
EOSINOPHIL NFR BLD AUTO: 0.1 % — SIGNIFICANT CHANGE UP (ref 0–6)
FERRITIN SERPL-MCNC: 3458 NG/ML — HIGH (ref 30–400)
GAS PNL BLDA: SIGNIFICANT CHANGE UP
GAS PNL BLDA: SIGNIFICANT CHANGE UP
GLUCOSE SERPL-MCNC: 119 MG/DL — HIGH (ref 70–99)
HCO3 BLDA-SCNC: 22 MMOL/L — SIGNIFICANT CHANGE UP (ref 21–28)
HCO3 BLDA-SCNC: 24 MMOL/L — SIGNIFICANT CHANGE UP (ref 21–28)
HCT VFR BLD CALC: 31.7 % — LOW (ref 39–50)
HGB BLD-MCNC: 10.8 G/DL — LOW (ref 13–17)
IMM GRANULOCYTES NFR BLD AUTO: 5.2 % — HIGH (ref 0–1.5)
LYMPHOCYTES # BLD AUTO: 0.53 K/UL — LOW (ref 1–3.3)
LYMPHOCYTES # BLD AUTO: 2.6 % — LOW (ref 13–44)
MAGNESIUM SERPL-MCNC: 2.4 MG/DL — SIGNIFICANT CHANGE UP (ref 1.6–2.6)
MCHC RBC-ENTMCNC: 30.9 PG — SIGNIFICANT CHANGE UP (ref 27–34)
MCHC RBC-ENTMCNC: 34.1 GM/DL — SIGNIFICANT CHANGE UP (ref 32–36)
MCV RBC AUTO: 90.6 FL — SIGNIFICANT CHANGE UP (ref 80–100)
MONOCYTES # BLD AUTO: 0.41 K/UL — SIGNIFICANT CHANGE UP (ref 0–0.9)
MONOCYTES NFR BLD AUTO: 2 % — SIGNIFICANT CHANGE UP (ref 2–14)
NEUTROPHILS # BLD AUTO: 18.09 K/UL — HIGH (ref 1.8–7.4)
NEUTROPHILS NFR BLD AUTO: 89.9 % — HIGH (ref 43–77)
NRBC # BLD: 0 /100 WBCS — SIGNIFICANT CHANGE UP (ref 0–0)
PCO2 BLDA: 38 MMHG — SIGNIFICANT CHANGE UP (ref 35–48)
PCO2 BLDA: 38 MMHG — SIGNIFICANT CHANGE UP (ref 35–48)
PH BLDA: 7.38 — SIGNIFICANT CHANGE UP (ref 7.35–7.45)
PH BLDA: 7.41 — SIGNIFICANT CHANGE UP (ref 7.35–7.45)
PHOSPHATE SERPL-MCNC: 4.3 MG/DL — SIGNIFICANT CHANGE UP (ref 2.5–4.5)
PLATELET # BLD AUTO: 294 K/UL — SIGNIFICANT CHANGE UP (ref 150–400)
PO2 BLDA: 70 MMHG — LOW (ref 83–108)
PO2 BLDA: 73 MMHG — LOW (ref 83–108)
POTASSIUM SERPL-MCNC: 4 MMOL/L — SIGNIFICANT CHANGE UP (ref 3.5–5.3)
POTASSIUM SERPL-SCNC: 4 MMOL/L — SIGNIFICANT CHANGE UP (ref 3.5–5.3)
PROCALCITONIN SERPL-MCNC: 1.38 NG/ML — HIGH (ref 0.02–0.1)
PROT SERPL-MCNC: 6.1 G/DL — SIGNIFICANT CHANGE UP (ref 6–8.3)
RBC # BLD: 3.5 M/UL — LOW (ref 4.2–5.8)
RBC # FLD: 14.2 % — SIGNIFICANT CHANGE UP (ref 10.3–14.5)
SAO2 % BLDA: 93 % — LOW (ref 95–100)
SAO2 % BLDA: 95 % — SIGNIFICANT CHANGE UP (ref 95–100)
SODIUM SERPL-SCNC: 145 MMOL/L — SIGNIFICANT CHANGE UP (ref 135–145)
SPECIMEN SOURCE: SIGNIFICANT CHANGE UP
TRIGL SERPL-MCNC: 308 MG/DL — HIGH (ref 10–149)
WBC # BLD: 20.15 K/UL — HIGH (ref 3.8–10.5)
WBC # FLD AUTO: 20.15 K/UL — HIGH (ref 3.8–10.5)

## 2020-04-09 PROCEDURE — 93010 ELECTROCARDIOGRAM REPORT: CPT

## 2020-04-09 PROCEDURE — 99291 CRITICAL CARE FIRST HOUR: CPT

## 2020-04-09 PROCEDURE — 71045 X-RAY EXAM CHEST 1 VIEW: CPT | Mod: 26

## 2020-04-09 RX ORDER — CISATRACURIUM BESYLATE 2 MG/ML
2 INJECTION INTRAVENOUS
Qty: 200 | Refills: 0 | Status: DISCONTINUED | OUTPATIENT
Start: 2020-04-09 | End: 2020-04-15

## 2020-04-09 RX ORDER — NOREPINEPHRINE BITARTRATE/D5W 8 MG/250ML
0.05 PLASTIC BAG, INJECTION (ML) INTRAVENOUS
Qty: 16 | Refills: 0 | Status: DISCONTINUED | OUTPATIENT
Start: 2020-04-09 | End: 2020-04-11

## 2020-04-09 RX ORDER — METOCLOPRAMIDE HCL 10 MG
10 TABLET ORAL EVERY 8 HOURS
Refills: 0 | Status: DISCONTINUED | OUTPATIENT
Start: 2020-04-09 | End: 2020-04-09

## 2020-04-09 RX ORDER — LABETALOL HCL 100 MG
10 TABLET ORAL ONCE
Refills: 0 | Status: COMPLETED | OUTPATIENT
Start: 2020-04-09 | End: 2020-04-09

## 2020-04-09 RX ORDER — CHLORHEXIDINE GLUCONATE 213 G/1000ML
1 SOLUTION TOPICAL
Refills: 0 | Status: DISCONTINUED | OUTPATIENT
Start: 2020-04-09 | End: 2020-04-15

## 2020-04-09 RX ORDER — CISATRACURIUM BESYLATE 2 MG/ML
6 INJECTION INTRAVENOUS ONCE
Refills: 0 | Status: COMPLETED | OUTPATIENT
Start: 2020-04-09 | End: 2020-04-09

## 2020-04-09 RX ORDER — ALBUMIN HUMAN 25 %
250 VIAL (ML) INTRAVENOUS
Refills: 0 | Status: COMPLETED | OUTPATIENT
Start: 2020-04-09 | End: 2020-04-09

## 2020-04-09 RX ADMIN — PIPERACILLIN AND TAZOBACTAM 200 GRAM(S): 4; .5 INJECTION, POWDER, LYOPHILIZED, FOR SOLUTION INTRAVENOUS at 05:10

## 2020-04-09 RX ADMIN — ENOXAPARIN SODIUM 40 MILLIGRAM(S): 100 INJECTION SUBCUTANEOUS at 05:10

## 2020-04-09 RX ADMIN — Medication 250 MILLIGRAM(S): at 17:03

## 2020-04-09 RX ADMIN — Medication 2.81 MICROGRAM(S)/KG/MIN: at 17:55

## 2020-04-09 RX ADMIN — DEXMEDETOMIDINE HYDROCHLORIDE IN 0.9% SODIUM CHLORIDE 1 MICROGRAM(S)/KG/HR: 4 INJECTION INTRAVENOUS at 17:55

## 2020-04-09 RX ADMIN — DEXMEDETOMIDINE HYDROCHLORIDE IN 0.9% SODIUM CHLORIDE 1 MICROGRAM(S)/KG/HR: 4 INJECTION INTRAVENOUS at 21:06

## 2020-04-09 RX ADMIN — FENTANYL CITRATE 3 MICROGRAM(S)/KG/HR: 50 INJECTION INTRAVENOUS at 05:11

## 2020-04-09 RX ADMIN — CHLORHEXIDINE GLUCONATE 15 MILLILITER(S): 213 SOLUTION TOPICAL at 17:03

## 2020-04-09 RX ADMIN — FENTANYL CITRATE 3 MICROGRAM(S)/KG/HR: 50 INJECTION INTRAVENOUS at 17:55

## 2020-04-09 RX ADMIN — PIPERACILLIN AND TAZOBACTAM 200 GRAM(S): 4; .5 INJECTION, POWDER, LYOPHILIZED, FOR SOLUTION INTRAVENOUS at 12:00

## 2020-04-09 RX ADMIN — CISATRACURIUM BESYLATE 7.2 MICROGRAM(S)/KG/MIN: 2 INJECTION INTRAVENOUS at 01:14

## 2020-04-09 RX ADMIN — PROPOFOL 1.8 MICROGRAM(S)/KG/MIN: 10 INJECTION, EMULSION INTRAVENOUS at 21:06

## 2020-04-09 RX ADMIN — CISATRACURIUM BESYLATE 7.2 MICROGRAM(S)/KG/MIN: 2 INJECTION INTRAVENOUS at 17:55

## 2020-04-09 RX ADMIN — Medication 250 MILLIGRAM(S): at 05:09

## 2020-04-09 RX ADMIN — PIPERACILLIN AND TAZOBACTAM 200 GRAM(S): 4; .5 INJECTION, POWDER, LYOPHILIZED, FOR SOLUTION INTRAVENOUS at 21:05

## 2020-04-09 RX ADMIN — Medication 10 MILLIGRAM(S): at 13:53

## 2020-04-09 RX ADMIN — CHLORHEXIDINE GLUCONATE 1 APPLICATION(S): 213 SOLUTION TOPICAL at 17:56

## 2020-04-09 RX ADMIN — CHLORHEXIDINE GLUCONATE 15 MILLILITER(S): 213 SOLUTION TOPICAL at 05:11

## 2020-04-09 RX ADMIN — PIPERACILLIN AND TAZOBACTAM 200 GRAM(S): 4; .5 INJECTION, POWDER, LYOPHILIZED, FOR SOLUTION INTRAVENOUS at 01:14

## 2020-04-09 RX ADMIN — PROPOFOL 1.8 MICROGRAM(S)/KG/MIN: 10 INJECTION, EMULSION INTRAVENOUS at 17:55

## 2020-04-09 RX ADMIN — Medication 81 MILLIGRAM(S): at 12:00

## 2020-04-09 RX ADMIN — QUETIAPINE FUMARATE 50 MILLIGRAM(S): 200 TABLET, FILM COATED ORAL at 12:00

## 2020-04-09 RX ADMIN — CLOPIDOGREL BISULFATE 75 MILLIGRAM(S): 75 TABLET, FILM COATED ORAL at 12:00

## 2020-04-09 RX ADMIN — FAMOTIDINE 20 MILLIGRAM(S): 10 INJECTION INTRAVENOUS at 05:10

## 2020-04-09 RX ADMIN — DEXMEDETOMIDINE HYDROCHLORIDE IN 0.9% SODIUM CHLORIDE 1 MICROGRAM(S)/KG/HR: 4 INJECTION INTRAVENOUS at 05:09

## 2020-04-09 RX ADMIN — PIPERACILLIN AND TAZOBACTAM 200 GRAM(S): 4; .5 INJECTION, POWDER, LYOPHILIZED, FOR SOLUTION INTRAVENOUS at 17:03

## 2020-04-09 RX ADMIN — Medication 1 APPLICATION(S): at 21:09

## 2020-04-09 RX ADMIN — FAMOTIDINE 20 MILLIGRAM(S): 10 INJECTION INTRAVENOUS at 17:02

## 2020-04-09 RX ADMIN — Medication 10 MILLIGRAM(S): at 20:00

## 2020-04-09 RX ADMIN — Medication 125 MILLILITER(S): at 17:05

## 2020-04-09 RX ADMIN — Medication 125 MILLILITER(S): at 17:03

## 2020-04-09 RX ADMIN — CISATRACURIUM BESYLATE 6 MILLIGRAM(S): 2 INJECTION INTRAVENOUS at 00:42

## 2020-04-09 NOTE — PROGRESS NOTE ADULT - ASSESSMENT
75 year old M, PMHx  COPD (no hx exacerbations or intubations), CAD s/p multiple stents, hypertension who presented to [facility] ED on 4/2 due to SOB, cough, and fever for 1 week. Of note he had followed with Dr. Mera as an outpatient and completed a 5 day course of azithromycin and cefdinir prior to presentation. He was admitted for hypoxemic respiratory failure and respiratory distress and tested positive for COVID. The patient was intubated on 4/3 (current vent day 6). He completed course of plaquenil and azithromycin on 4/7. During his admission he was on vanc and zosyn for presumed HAP which was discontinued but restarted  on 4/8 for empiric coverage given new fever, elevated WBC count. Overnight patient desyncronous on vent, paralyzed with improvement.     Plan:  1. Neurovascular:  - Intubation requiring sedation  -Drips: precedex, propofol, fentanyl - Nimbex started overnight at 3mcg/kg/min, decreasing numbex 1mcg/kg/min check train of 4  - Monitor neuro status  - Continue seroquel 50 mg daily    2. Respiratory: Acute hypoxemic respiratory failure 2/2 COVID.    -  Date Intubated: 4/2  - _+COVID    -Plaquenil and azithromycin completed 4/7    -Tocilizumab 8mg/kg for one dose if approved (not given)  - Solumedrol given 4/3-4/8	  -Vent settings: 70%, , RR 30, PEEP 10  - Per Dr. Soliz- decreasing FiO2 to 50%, will follow saturations and get gas at noon.   -Position: Supine, last proned 4/6  -CXR: stable vs. improved infiltrates today, no effusions   -Continue vent weaning as tolerated    3. Cardiovascular:   -Plaquenil completed, QTc: 4/8 WNL per telemetry   -Drips: Levo 0.02 started this morning for SBP in 80s, currently pressure currently 110s, MAP in 80s, will wean as tolerated.   -Monitor HR/BP/Tele  - Hx of CAD s/p multiple stents, continue ASA and plavix  - Hx of HTN- currently on levo.     4. GI: NPO on tube feeds   -Nutrition: jevity 1.5, currently tolerating 10cc/hr, continue to titrate to goal of 40cc/hr  -Prophylaxis: famotidine 20 mg IV q12    5. /Renal: +Villa  -BUN/Cr: 41/0.96 from 40/0.86  -Trend Cr on AM labs  -Monitor UOP- negative 2L last 24 hours  -Replete electrolytes as needed for goal K+ 4.0, Phos 3.0, Mg 2.0    6. ID: Sepsis 2/2 COVID   -Plaquenil 400mg BID x 2 doses then 200mg BID x 4 days: Completed 4/7  -Azithromycin 250mg QD for hospital course, max 14-day dose: Completed 4/7  -Tocilizumab 8mg/kg for one dose if approved: Not given   - Solumedrol given 4/3-4/8  -WBC: 20 from 22.37  yesterday    -Emperic Vanc/zosyn started yesterday for fever 102, new IJ placed,  f/u vanc trough prior to tomorrow AM dose.   -Blood culture negative, f/u culture on IJ removed yesterday.   -Continue with regular surveillance labs including CBC with diff, CMP, Mg, Phos, CRP, ABG, Ferritin, CK, Triglycerides, Procalcitonin, D-Dimer, EKG  -Additional labs q3d: ESR, T-cell subset, LDH, Ferritin, CK, Troponin, Coags  -COVID isolation protocol     7. Endo:  - No known hx of diabetes or thyroid disease  - Continue MISS and to monitor finger sticks     8. Heme:   -H/H:  10/31   -Continue to monitor on daily  -DVT ppx: Lovenox SQ and SCDs    Disposition: Full Code    Critical Care Time Spent: 70 mins

## 2020-04-09 NOTE — PROGRESS NOTE ADULT - SUBJECTIVE AND OBJECTIVE BOX
Patient Discussed on Morning Rounds with Dr. Soliz    Primary Diagnosis: COVID Pneumonia    SUBJECTIVE:  75yMale intubated and sedated. NAD.     Interval Events:  Overnight, desyncronous on vent, hypoxia, paralyzed with nimbex, with improvement.     OBJECTIVE:  Vitals: ICU Vital Signs Last 24 Hrs  T(C): 36.9 (08 Apr 2020 16:00), Max: 37.1 (08 Apr 2020 12:00)  T(F): 98.4 (08 Apr 2020 16:00), Max: 98.8 (08 Apr 2020 12:00)  HR: 87 (09 Apr 2020 09:00) (69 - 124)  BP: --  BP(mean): --  ABP: 113/59 (09 Apr 2020 09:00) (89/50 - 168/78)  ABP(mean): 81 (09 Apr 2020 09:00) (66 - 117)  RR: 30 (09 Apr 2020 09:00) (30 - 32)  SpO2: 95% (09 Apr 2020 09:00) (88% - 100%)    I&O:  I&O's Detail    08 Apr 2020 07:01  -  09 Apr 2020 07:00  --------------------------------------------------------  IN:    cisatracurium Infusion: 42 mL    dexmedetomidine Infusion: 108 mL    fentaNYL Infusion.: 64.5 mL    propofol Infusion: 222 mL  Total IN: 436.5 mL    OUT:    Indwelling Catheter - Urethral: 2250 mL    Voided: 250 mL  Total OUT: 2500 mL    Total NET: -2063.5 mL      09 Apr 2020 07:01  -  09 Apr 2020 09:46  --------------------------------------------------------  IN:    cisatracurium Infusion: 21 mL    dexmedetomidine Infusion: 27 mL    fentaNYL Infusion.: 18 mL    norepinephrine Infusion: 3.6 mL    propofol Infusion: 51 mL  Total IN: 120.6 mL    OUT:    Indwelling Catheter - Urethral: 50 mL  Total OUT: 50 mL    Total NET: 70.6 mL        VENTILATOR SETTINGS:  Mode: AC/ CMV (Assist Control/ Continuous Mandatory Ventilation)  RR (machine): 30  TV (machine): 400  FiO2: 80  PEEP: 10  ITime: 1.4  MAP: 11  PIP: 14    ABG - ( 09 Apr 2020 04:04 )  pH, Arterial: 7.41  pH, Blood: x     /  pCO2: 38    /  pO2: 70    / HCO3: 24    / Base Excess: -0.9  /  SaO2: 93            PHYSICAL EXAM:   General: No acute distress, intubated in 9E ICU  Neuro: Sedated, not responding to commands  CV: RRR   Pulm: Lung sounds difficult to appreciate on exam  : Villa in place  GI: soft, NT/ND. +NGT  HEENT: Blisters noted on right ear lobe (likely from proning)  EXTREMITIES: WWP, No edema  VASCULAR: palpable b/l distal pulses    LABS:                         10.8   20.15 )-----------( 294      ( 09 Apr 2020 04:03 )             31.7   04-09    145  |  110<H>  |  41<H>  ----------------------------<  119<H>  4.0   |  23  |  0.96    Ca    8.1<L>      09 Apr 2020 04:03  Phos  4.3     04-09  Mg     2.4     04-09    TPro  6.1  /  Alb  2.1<L>  /  TBili  1.8<H>  /  DBili  x   /  AST  88<H>  /  ALT  122<H>  /  AlkPhos  100  04-09    Ferritin, Serum: 3458 ng/mL (04-09-20 @ 04:03)  Triglycerides, Serum: 308 mg/dL (04-09-20 @ 04:03)  D-Dimer Assay, Quantitative: 1452 ng/mL DDU (04-09-20 @ 04:03)  ABG - ( 09 Apr 2020 04:04 )  pH, Arterial: 7.41  pH, Blood: x     /  pCO2: 38    /  pO2: 70    / HCO3: 24    / Base Excess: -0.9  /  SaO2: 93                CAPILLARY BLOOD GLUCOSE      POCT Blood Glucose.: 105 mg/dL (09 Apr 2020 00:53)  POCT Blood Glucose.: 146 mg/dL (08 Apr 2020 17:07)  POCT Blood Glucose.: 131 mg/dL (08 Apr 2020 11:56)     MEDICATIONS  (STANDING):  aspirin enteric coated 81 milliGRAM(s) Oral daily  chlorhexidine 0.12% Liquid 15 milliLiter(s) Oral Mucosa every 12 hours  cisatracurium Infusion 2 MICROgram(s)/kG/Min (7.2 mL/Hr) IV Continuous <Continuous>  clopidogrel Tablet 75 milliGRAM(s) Oral daily  dexMEDEtomidine Infusion 0.067 MICROgram(s)/kG/Hr (1 mL/Hr) IV Continuous <Continuous>  dextrose 5%. 1000 milliLiter(s) (50 mL/Hr) IV Continuous <Continuous>  dextrose 50% Injectable 12.5 Gram(s) IV Push once  dextrose 50% Injectable 25 Gram(s) IV Push once  dextrose 50% Injectable 25 Gram(s) IV Push once  enoxaparin Injectable 40 milliGRAM(s) SubCutaneous every 24 hours  famotidine Injectable 20 milliGRAM(s) IV Push every 12 hours  fentaNYL   Infusion. 0.5 MICROgram(s)/kG/Hr (3 mL/Hr) IV Continuous <Continuous>  insulin lispro (HumaLOG) corrective regimen sliding scale   SubCutaneous every 6 hours  norepinephrine Infusion 0.05 MICROgram(s)/kG/Min (2.81 mL/Hr) IV Continuous <Continuous>  piperacillin/tazobactam IVPB.. 3.375 Gram(s) IV Intermittent every 6 hours  propofol Infusion 5 MICROgram(s)/kG/Min (1.8 mL/Hr) IV Continuous <Continuous>  QUEtiapine 50 milliGRAM(s) Oral daily  vancomycin  IVPB 750 milliGRAM(s) IV Intermittent every 12 hours  vancomycin  IVPB        MEDICATIONS  (PRN):  acetaminophen    Suspension .. 650 milliGRAM(s) Oral every 6 hours PRN Temp greater or equal to 38C (100.4F)  ALBUTerol    90 MICROgram(s) HFA Inhaler 2 Puff(s) Inhalation every 4 hours PRN Shortness of Breath and/or Wheezing  dextrose 40% Gel 15 Gram(s) Oral once PRN Blood Glucose LESS THAN 70 milliGRAM(s)/deciliter  glucagon  Injectable 1 milliGRAM(s) IntraMuscular once PRN Glucose LESS THAN 70 milligrams/deciliter      RADIOLOGY/OTHER STUDIES:  < from: Xray Chest 1 View- PORTABLE-Routine (04.09.20 @ 07:18) >  IMPRESSION:  No significant interval change in the lung findings..    < end of copied text > Patient Discussed on Morning Rounds with Dr. Soliz    Primary Diagnosis: COVID Pneumonia    SUBJECTIVE:  75yMale intubated and sedated. NAD.     Interval Events:  Overnight, desyncronous on vent, hypoxia, paralyzed with nimbex, with improvement.     OBJECTIVE:  Vitals: ICU Vital Signs Last 24 Hrs  T(C): 36.9 (08 Apr 2020 16:00), Max: 37.1 (08 Apr 2020 12:00)  T(F): 98.4 (08 Apr 2020 16:00), Max: 98.8 (08 Apr 2020 12:00)  HR: 87 (09 Apr 2020 09:00) (69 - 124)  BP: --  BP(mean): --  ABP: 113/59 (09 Apr 2020 09:00) (89/50 - 168/78)  ABP(mean): 81 (09 Apr 2020 09:00) (66 - 117)  RR: 30 (09 Apr 2020 09:00) (30 - 32)  SpO2: 95% (09 Apr 2020 09:00) (88% - 100%)    I&O:  I&O's Detail    08 Apr 2020 07:01  -  09 Apr 2020 07:00  --------------------------------------------------------  IN:    cisatracurium Infusion: 42 mL    dexmedetomidine Infusion: 108 mL    fentaNYL Infusion.: 64.5 mL    propofol Infusion: 222 mL  Total IN: 436.5 mL    OUT:    Indwelling Catheter - Urethral: 2250 mL    Voided: 250 mL  Total OUT: 2500 mL    Total NET: -2063.5 mL      09 Apr 2020 07:01  -  09 Apr 2020 09:46  --------------------------------------------------------  IN:    cisatracurium Infusion: 21 mL    dexmedetomidine Infusion: 27 mL    fentaNYL Infusion.: 18 mL    norepinephrine Infusion: 3.6 mL    propofol Infusion: 51 mL  Total IN: 120.6 mL    OUT:    Indwelling Catheter - Urethral: 50 mL  Total OUT: 50 mL    Total NET: 70.6 mL        VENTILATOR SETTINGS:  Mode: AC/ CMV (Assist Control/ Continuous Mandatory Ventilation)  RR (machine): 30  TV (machine): 400  FiO2: 80  PEEP: 10  ITime: 1.4  MAP: 11  PIP: 14    ABG - ( 09 Apr 2020 04:04 )  pH, Arterial: 7.41  pH, Blood: x     /  pCO2: 38    /  pO2: 70    / HCO3: 24    / Base Excess: -0.9  /  SaO2: 93            PHYSICAL EXAM:   General: No acute distress, intubated in 9E ICU  Neuro: Sedated, not responding to commands  CV: RRR   Pulm: Lung sounds difficult to appreciate on exam  : Vlila in place  GI: soft, NT/ND. +NGT  HEENT: Blisters noted on right ear lobe (likely from proning)  EXTREMITIES: WWP, 1+ b/l LE edema  VASCULAR: palpable b/l distal pulses    LABS:                         10.8   20.15 )-----------( 294      ( 09 Apr 2020 04:03 )             31.7   04-09    145  |  110<H>  |  41<H>  ----------------------------<  119<H>  4.0   |  23  |  0.96    Ca    8.1<L>      09 Apr 2020 04:03  Phos  4.3     04-09  Mg     2.4     04-09    TPro  6.1  /  Alb  2.1<L>  /  TBili  1.8<H>  /  DBili  x   /  AST  88<H>  /  ALT  122<H>  /  AlkPhos  100  04-09    Ferritin, Serum: 3458 ng/mL (04-09-20 @ 04:03)  Triglycerides, Serum: 308 mg/dL (04-09-20 @ 04:03)  D-Dimer Assay, Quantitative: 1452 ng/mL DDU (04-09-20 @ 04:03)  ABG - ( 09 Apr 2020 04:04 )  pH, Arterial: 7.41  pH, Blood: x     /  pCO2: 38    /  pO2: 70    / HCO3: 24    / Base Excess: -0.9  /  SaO2: 93                CAPILLARY BLOOD GLUCOSE      POCT Blood Glucose.: 105 mg/dL (09 Apr 2020 00:53)  POCT Blood Glucose.: 146 mg/dL (08 Apr 2020 17:07)  POCT Blood Glucose.: 131 mg/dL (08 Apr 2020 11:56)     MEDICATIONS  (STANDING):  aspirin enteric coated 81 milliGRAM(s) Oral daily  chlorhexidine 0.12% Liquid 15 milliLiter(s) Oral Mucosa every 12 hours  cisatracurium Infusion 2 MICROgram(s)/kG/Min (7.2 mL/Hr) IV Continuous <Continuous>  clopidogrel Tablet 75 milliGRAM(s) Oral daily  dexMEDEtomidine Infusion 0.067 MICROgram(s)/kG/Hr (1 mL/Hr) IV Continuous <Continuous>  dextrose 5%. 1000 milliLiter(s) (50 mL/Hr) IV Continuous <Continuous>  dextrose 50% Injectable 12.5 Gram(s) IV Push once  dextrose 50% Injectable 25 Gram(s) IV Push once  dextrose 50% Injectable 25 Gram(s) IV Push once  enoxaparin Injectable 40 milliGRAM(s) SubCutaneous every 24 hours  famotidine Injectable 20 milliGRAM(s) IV Push every 12 hours  fentaNYL   Infusion. 0.5 MICROgram(s)/kG/Hr (3 mL/Hr) IV Continuous <Continuous>  insulin lispro (HumaLOG) corrective regimen sliding scale   SubCutaneous every 6 hours  norepinephrine Infusion 0.05 MICROgram(s)/kG/Min (2.81 mL/Hr) IV Continuous <Continuous>  piperacillin/tazobactam IVPB.. 3.375 Gram(s) IV Intermittent every 6 hours  propofol Infusion 5 MICROgram(s)/kG/Min (1.8 mL/Hr) IV Continuous <Continuous>  QUEtiapine 50 milliGRAM(s) Oral daily  vancomycin  IVPB 750 milliGRAM(s) IV Intermittent every 12 hours  vancomycin  IVPB        MEDICATIONS  (PRN):  acetaminophen    Suspension .. 650 milliGRAM(s) Oral every 6 hours PRN Temp greater or equal to 38C (100.4F)  ALBUTerol    90 MICROgram(s) HFA Inhaler 2 Puff(s) Inhalation every 4 hours PRN Shortness of Breath and/or Wheezing  dextrose 40% Gel 15 Gram(s) Oral once PRN Blood Glucose LESS THAN 70 milliGRAM(s)/deciliter  glucagon  Injectable 1 milliGRAM(s) IntraMuscular once PRN Glucose LESS THAN 70 milligrams/deciliter      RADIOLOGY/OTHER STUDIES:  < from: Xray Chest 1 View- PORTABLE-Routine (04.09.20 @ 07:18) >  IMPRESSION:  No significant interval change in the lung findings..    < end of copied text >

## 2020-04-10 LAB
-  COAGULASE NEGATIVE STAPHYLOCOCCUS: SIGNIFICANT CHANGE UP
ALBUMIN SERPL ELPH-MCNC: 2.3 G/DL — LOW (ref 3.3–5)
ALP SERPL-CCNC: 76 U/L — SIGNIFICANT CHANGE UP (ref 40–120)
ALT FLD-CCNC: 86 U/L — HIGH (ref 10–45)
ANION GAP SERPL CALC-SCNC: 12 MMOL/L — SIGNIFICANT CHANGE UP (ref 5–17)
AST SERPL-CCNC: 57 U/L — HIGH (ref 10–40)
BASE EXCESS BLDA CALC-SCNC: -1.1 MMOL/L — SIGNIFICANT CHANGE UP (ref -2–3)
BASE EXCESS BLDA CALC-SCNC: -1.3 MMOL/L — SIGNIFICANT CHANGE UP (ref -2–3)
BASE EXCESS BLDA CALC-SCNC: -2.7 MMOL/L — LOW (ref -2–3)
BASOPHILS # BLD AUTO: 0 K/UL — SIGNIFICANT CHANGE UP (ref 0–0.2)
BASOPHILS NFR BLD AUTO: 0 % — SIGNIFICANT CHANGE UP (ref 0–2)
BILIRUB SERPL-MCNC: 1.7 MG/DL — HIGH (ref 0.2–1.2)
BUN SERPL-MCNC: 37 MG/DL — HIGH (ref 7–23)
CALCIUM SERPL-MCNC: 8.2 MG/DL — LOW (ref 8.4–10.5)
CHLORIDE SERPL-SCNC: 110 MMOL/L — HIGH (ref 96–108)
CO2 SERPL-SCNC: 23 MMOL/L — SIGNIFICANT CHANGE UP (ref 22–31)
CREAT SERPL-MCNC: 0.95 MG/DL — SIGNIFICANT CHANGE UP (ref 0.5–1.3)
CRP SERPL-MCNC: 41.44 MG/DL — HIGH (ref 0–0.4)
CULTURE RESULTS: NO GROWTH — SIGNIFICANT CHANGE UP
CULTURE RESULTS: NO GROWTH — SIGNIFICANT CHANGE UP
D DIMER BLD IA.RAPID-MCNC: 1224 NG/ML DDU — HIGH
EOSINOPHIL # BLD AUTO: 0 K/UL — SIGNIFICANT CHANGE UP (ref 0–0.5)
EOSINOPHIL NFR BLD AUTO: 0 % — SIGNIFICANT CHANGE UP (ref 0–6)
ERYTHROCYTE [SEDIMENTATION RATE] IN BLOOD: 130 MM/HR — HIGH
FERRITIN SERPL-MCNC: 3413 NG/ML — HIGH (ref 30–400)
GLUCOSE SERPL-MCNC: 168 MG/DL — HIGH (ref 70–99)
GRAM STN FLD: SIGNIFICANT CHANGE UP
HCO3 BLDA-SCNC: 24 MMOL/L — SIGNIFICANT CHANGE UP (ref 21–28)
HCO3 BLDA-SCNC: 24 MMOL/L — SIGNIFICANT CHANGE UP (ref 21–28)
HCO3 BLDA-SCNC: 25 MMOL/L — SIGNIFICANT CHANGE UP (ref 21–28)
HCT VFR BLD CALC: 28.2 % — LOW (ref 39–50)
HGB BLD-MCNC: 9.1 G/DL — LOW (ref 13–17)
LYMPHOCYTES # BLD AUTO: 0.2 K/UL — LOW (ref 1–3.3)
LYMPHOCYTES # BLD AUTO: 0.9 % — LOW (ref 13–44)
MAGNESIUM SERPL-MCNC: 2.6 MG/DL — SIGNIFICANT CHANGE UP (ref 1.6–2.6)
MCHC RBC-ENTMCNC: 30.2 PG — SIGNIFICANT CHANGE UP (ref 27–34)
MCHC RBC-ENTMCNC: 32.3 GM/DL — SIGNIFICANT CHANGE UP (ref 32–36)
MCV RBC AUTO: 93.7 FL — SIGNIFICANT CHANGE UP (ref 80–100)
METHOD TYPE: SIGNIFICANT CHANGE UP
MONOCYTES # BLD AUTO: 0.59 K/UL — SIGNIFICANT CHANGE UP (ref 0–0.9)
MONOCYTES NFR BLD AUTO: 2.6 % — SIGNIFICANT CHANGE UP (ref 2–14)
NEUTROPHILS # BLD AUTO: 21.37 K/UL — HIGH (ref 1.8–7.4)
NEUTROPHILS NFR BLD AUTO: 93 % — HIGH (ref 43–77)
PCO2 BLDA: 40 MMHG — SIGNIFICANT CHANGE UP (ref 35–48)
PCO2 BLDA: 46 MMHG — SIGNIFICANT CHANGE UP (ref 35–48)
PCO2 BLDA: 48 MMHG — SIGNIFICANT CHANGE UP (ref 35–48)
PH BLDA: 7.31 — LOW (ref 7.35–7.45)
PH BLDA: 7.35 — SIGNIFICANT CHANGE UP (ref 7.35–7.45)
PH BLDA: 7.39 — SIGNIFICANT CHANGE UP (ref 7.35–7.45)
PHOSPHATE SERPL-MCNC: 4.4 MG/DL — SIGNIFICANT CHANGE UP (ref 2.5–4.5)
PLATELET # BLD AUTO: 274 K/UL — SIGNIFICANT CHANGE UP (ref 150–400)
PO2 BLDA: 134 MMHG — HIGH (ref 83–108)
PO2 BLDA: 80 MMHG — LOW (ref 83–108)
PO2 BLDA: 85 MMHG — SIGNIFICANT CHANGE UP (ref 83–108)
POTASSIUM SERPL-MCNC: 4.6 MMOL/L — SIGNIFICANT CHANGE UP (ref 3.5–5.3)
POTASSIUM SERPL-SCNC: 4.6 MMOL/L — SIGNIFICANT CHANGE UP (ref 3.5–5.3)
PROT SERPL-MCNC: 5.8 G/DL — LOW (ref 6–8.3)
RBC # BLD: 3.01 M/UL — LOW (ref 4.2–5.8)
RBC # FLD: 14.6 % — HIGH (ref 10.3–14.5)
SAO2 % BLDA: 95 % — SIGNIFICANT CHANGE UP (ref 95–100)
SAO2 % BLDA: 96 % — SIGNIFICANT CHANGE UP (ref 95–100)
SAO2 % BLDA: 99 % — SIGNIFICANT CHANGE UP (ref 95–100)
SODIUM SERPL-SCNC: 145 MMOL/L — SIGNIFICANT CHANGE UP (ref 135–145)
SPECIMEN SOURCE: SIGNIFICANT CHANGE UP
SPECIMEN SOURCE: SIGNIFICANT CHANGE UP
VANCOMYCIN TROUGH SERPL-MCNC: 11.3 UG/ML — SIGNIFICANT CHANGE UP (ref 10–20)
WBC # BLD: 22.76 K/UL — HIGH (ref 3.8–10.5)
WBC # FLD AUTO: 22.76 K/UL — HIGH (ref 3.8–10.5)

## 2020-04-10 PROCEDURE — 71045 X-RAY EXAM CHEST 1 VIEW: CPT | Mod: 26,76

## 2020-04-10 PROCEDURE — 71045 X-RAY EXAM CHEST 1 VIEW: CPT | Mod: 26,77

## 2020-04-10 RX ORDER — FENTANYL CITRATE 50 UG/ML
25 INJECTION INTRAVENOUS DAILY
Refills: 0 | Status: DISCONTINUED | OUTPATIENT
Start: 2020-04-10 | End: 2020-04-12

## 2020-04-10 RX ORDER — METOPROLOL TARTRATE 50 MG
2.5 TABLET ORAL
Refills: 0 | Status: COMPLETED | OUTPATIENT
Start: 2020-04-10 | End: 2020-04-10

## 2020-04-10 RX ORDER — CISATRACURIUM BESYLATE 2 MG/ML
10 INJECTION INTRAVENOUS ONCE
Refills: 0 | Status: COMPLETED | OUTPATIENT
Start: 2020-04-10 | End: 2020-04-10

## 2020-04-10 RX ORDER — FENTANYL CITRATE 50 UG/ML
25 INJECTION INTRAVENOUS
Refills: 0 | Status: DISCONTINUED | OUTPATIENT
Start: 2020-04-10 | End: 2020-04-10

## 2020-04-10 RX ORDER — VANCOMYCIN HCL 1 G
1000 VIAL (EA) INTRAVENOUS EVERY 12 HOURS
Refills: 0 | Status: DISCONTINUED | OUTPATIENT
Start: 2020-04-10 | End: 2020-04-12

## 2020-04-10 RX ORDER — CLONAZEPAM 1 MG
1 TABLET ORAL EVERY 6 HOURS
Refills: 0 | Status: DISCONTINUED | OUTPATIENT
Start: 2020-04-10 | End: 2020-04-14

## 2020-04-10 RX ORDER — DEXMEDETOMIDINE HYDROCHLORIDE IN 0.9% SODIUM CHLORIDE 4 UG/ML
0.04 INJECTION INTRAVENOUS
Qty: 200 | Refills: 0 | Status: DISCONTINUED | OUTPATIENT
Start: 2020-04-10 | End: 2020-04-11

## 2020-04-10 RX ORDER — FENTANYL CITRATE 50 UG/ML
25 INJECTION INTRAVENOUS ONCE
Refills: 0 | Status: DISCONTINUED | OUTPATIENT
Start: 2020-04-10 | End: 2020-04-10

## 2020-04-10 RX ORDER — QUETIAPINE FUMARATE 200 MG/1
50 TABLET, FILM COATED ORAL
Refills: 0 | Status: DISCONTINUED | OUTPATIENT
Start: 2020-04-10 | End: 2020-04-15

## 2020-04-10 RX ADMIN — PIPERACILLIN AND TAZOBACTAM 200 GRAM(S): 4; .5 INJECTION, POWDER, LYOPHILIZED, FOR SOLUTION INTRAVENOUS at 11:04

## 2020-04-10 RX ADMIN — Medication 2: at 23:58

## 2020-04-10 RX ADMIN — FAMOTIDINE 20 MILLIGRAM(S): 10 INJECTION INTRAVENOUS at 17:24

## 2020-04-10 RX ADMIN — CHLORHEXIDINE GLUCONATE 15 MILLILITER(S): 213 SOLUTION TOPICAL at 06:03

## 2020-04-10 RX ADMIN — FENTANYL CITRATE 25 MICROGRAM(S): 50 INJECTION INTRAVENOUS at 10:39

## 2020-04-10 RX ADMIN — PROPOFOL 1.8 MICROGRAM(S)/KG/MIN: 10 INJECTION, EMULSION INTRAVENOUS at 22:14

## 2020-04-10 RX ADMIN — FAMOTIDINE 20 MILLIGRAM(S): 10 INJECTION INTRAVENOUS at 07:25

## 2020-04-10 RX ADMIN — ENOXAPARIN SODIUM 40 MILLIGRAM(S): 100 INJECTION SUBCUTANEOUS at 07:25

## 2020-04-10 RX ADMIN — Medication 1 APPLICATION(S): at 21:53

## 2020-04-10 RX ADMIN — CLOPIDOGREL BISULFATE 75 MILLIGRAM(S): 75 TABLET, FILM COATED ORAL at 11:14

## 2020-04-10 RX ADMIN — QUETIAPINE FUMARATE 50 MILLIGRAM(S): 200 TABLET, FILM COATED ORAL at 17:24

## 2020-04-10 RX ADMIN — Medication 1 MILLIGRAM(S): at 23:34

## 2020-04-10 RX ADMIN — Medication 1 APPLICATION(S): at 14:00

## 2020-04-10 RX ADMIN — Medication 2.5 MILLIGRAM(S): at 14:00

## 2020-04-10 RX ADMIN — PIPERACILLIN AND TAZOBACTAM 200 GRAM(S): 4; .5 INJECTION, POWDER, LYOPHILIZED, FOR SOLUTION INTRAVENOUS at 23:36

## 2020-04-10 RX ADMIN — FENTANYL CITRATE 25 MICROGRAM(S): 50 INJECTION INTRAVENOUS at 10:24

## 2020-04-10 RX ADMIN — FENTANYL CITRATE 25 MICROGRAM(S): 50 INJECTION INTRAVENOUS at 14:20

## 2020-04-10 RX ADMIN — Medication 40 MILLIGRAM(S): at 21:52

## 2020-04-10 RX ADMIN — Medication 1 APPLICATION(S): at 07:25

## 2020-04-10 RX ADMIN — PIPERACILLIN AND TAZOBACTAM 200 GRAM(S): 4; .5 INJECTION, POWDER, LYOPHILIZED, FOR SOLUTION INTRAVENOUS at 07:26

## 2020-04-10 RX ADMIN — Medication 81 MILLIGRAM(S): at 11:14

## 2020-04-10 RX ADMIN — PIPERACILLIN AND TAZOBACTAM 200 GRAM(S): 4; .5 INJECTION, POWDER, LYOPHILIZED, FOR SOLUTION INTRAVENOUS at 17:24

## 2020-04-10 RX ADMIN — FENTANYL CITRATE 3 MICROGRAM(S)/KG/HR: 50 INJECTION INTRAVENOUS at 19:13

## 2020-04-10 RX ADMIN — FENTANYL CITRATE 25 MICROGRAM(S): 50 INJECTION INTRAVENOUS at 14:00

## 2020-04-10 RX ADMIN — CISATRACURIUM BESYLATE 7.2 MICROGRAM(S)/KG/MIN: 2 INJECTION INTRAVENOUS at 19:11

## 2020-04-10 RX ADMIN — Medication 1 MILLIGRAM(S): at 11:14

## 2020-04-10 RX ADMIN — Medication 1 MILLIGRAM(S): at 17:25

## 2020-04-10 RX ADMIN — Medication 40 MILLIGRAM(S): at 14:34

## 2020-04-10 RX ADMIN — Medication 250 MILLIGRAM(S): at 10:34

## 2020-04-10 RX ADMIN — Medication 250 MILLIGRAM(S): at 21:52

## 2020-04-10 RX ADMIN — Medication 2: at 12:09

## 2020-04-10 RX ADMIN — CISATRACURIUM BESYLATE 10 MILLIGRAM(S): 2 INJECTION INTRAVENOUS at 10:25

## 2020-04-10 RX ADMIN — PROPOFOL 1.8 MICROGRAM(S)/KG/MIN: 10 INJECTION, EMULSION INTRAVENOUS at 17:13

## 2020-04-10 RX ADMIN — Medication 2.5 MILLIGRAM(S): at 18:19

## 2020-04-10 RX ADMIN — CHLORHEXIDINE GLUCONATE 15 MILLILITER(S): 213 SOLUTION TOPICAL at 17:24

## 2020-04-10 RX ADMIN — CHLORHEXIDINE GLUCONATE 1 APPLICATION(S): 213 SOLUTION TOPICAL at 06:03

## 2020-04-10 NOTE — PROGRESS NOTE ADULT - ASSESSMENT
75 year old M, PMHx  COPD (no hx exacerbations or intubations), CAD s/p multiple stents, hypertension who presented to [facility] ED on 4/2 due to SOB, cough, and fever for 1 week. Of note he had followed with Dr. Mera as an outpatient and completed a 5 day course of azithromycin and cefdinir prior to presentation. He was admitted for hypoxemic respiratory failure and respiratory distress and tested positive for COVID. The patient was intubated on 4/3 (current vent day 7). He completed course of plaquenil and azithromycin on 4/7. During his admission he was on vanc and zosyn for presumed HAP which was discontinued but restarted  on 4/8 for empiric coverage given new fever, elevated WBC count. Overnight patient was prone. This morning after supination he desated requiring 100% O2. ETTube reposition, with improvement of of O2 Sats.     1. Neurovascular: No delirium  -Intubation requiring sedation  -Drips:  fentanyl, propofol  seroquel increased to 50mg BID  -PRNs:  -Monitor neuro status    2. Respiratory: Acute hypoxemic respiratory failure 2/2 COVID.    -Date Intubated:4/3  -COVID pneumonia:     -Plaquenil 400mg BID x 2 doses then 200mg BID x 4 days  (completed 4/7)    -Azithromycin 250mg QD for hospital course, max 14-day dose (completed 4/7)     -Tocilizumab 8mg/kg for one dose if approved (not given)    -Ascorbic Acid and Thiamine  -Vent settings: 80%/rr30/400/peep 10  -Position: supine  -CXR:  -Continue vent weaning as tolerated    3. Cardiovascular: Pressor requirement 2/2 sedation/pain regimen.   -Daily EKG's to assess for QT prolongation    -QTc:   -asa and plavix  -Monitor HR/BP/Tele    4. GI: NPO  -Nutrition: Jevity 1.5 @20cc/hr--**on hold while prone  -Prophylaxis: Pepcid  -C/w bowel regimen    5. /Renal: +Villa  -BUN/Cr: 37/0.95  -Trend Cr on AM labs  -Monitor UOP  -Replete electrolytes as needed for goal K+ 4.0, Phos 3.0, Mg 2.0    6. ID:   -WBC:   27.7  --4/8 blood cultures w/gram + clusters  --continue vanco and zosyn  --check am vanco trough  -Continue with regular surveillance labs including CBC with diff, CMP, Mg, Phos, CRP, ABG, Ferritin, CK, Triglycerides, Procalcitonin, D-Dimer, EKG  -Additional labs q3d: ESR, T-cell subset, LDH, Ferritin, CK, Troponin, Coags  -COVID isolation protocol     7. Endo:  insulin ss    8. Heme:   -H/H:9/28  -Continue to monitor on daily and q3d labs as above.  -DVT ppx: Lovenox SQ and SCDs  [NO HEPARIN SQ]    Full Code    Critical Care Time Spent: 70mins

## 2020-04-10 NOTE — PROGRESS NOTE ADULT - SUBJECTIVE AND OBJECTIVE BOX
Patient Discussed on Morning Rounds with Dr. Bower    Primary Diagnosis: COVID Pneumonia    SUBJECTIVE:  76 yo male with PMH of COPD. Admitted with resp failure s/t covid.     Interval Events:  Prone over night. Supinated @ 9am.     OBJECTIVE:  Vitals: ICU Vital Signs Last 24 Hrs  T(C): 36.2 (10 Apr 2020 05:01), Max: 37.7 (09 Apr 2020 18:00)  T(F): 97.1 (10 Apr 2020 05:01), Max: 99.9 (09 Apr 2020 18:00)  HR: 133 (10 Apr 2020 10:00) (82 - 133)  BP: 95/55 (10 Apr 2020 00:15) (84/55 - 95/55)  BP(mean): 70 (10 Apr 2020 00:15) (65 - 70)  ABP: 185/88 (10 Apr 2020 10:00) (81/49 - 185/88)  ABP(mean): 128 (10 Apr 2020 10:00) (64 - 128)  RR: 30 (10 Apr 2020 08:23) (30 - 30)  SpO2: 90% (10 Apr 2020 10:00) (90% - 97%)    I&O:Even  I&O's Detail    09 Apr 2020 07:01  -  10 Apr 2020 07:00  --------------------------------------------------------  IN:    Albumin 5%  - 250 mL: 500 mL    cisatracurium Infusion: 67 mL    dexmedetomidine Infusion: 93.5 mL    fentaNYL Infusion.: 114 mL    IV PiggyBack: 200 mL    norepinephrine Infusion: 10.8 mL    ns in tub fed  [id number]: 260 mL    propofol Infusion: 232 mL    Solution: 250 mL  Total IN: 1727.3 mL    OUT:    Indwelling Catheter - Urethral: 1525 mL    Nasoenteral Tube: 100 mL  Total OUT: 1625 mL    Total NET: 102.3 mL      10 Apr 2020 07:01  -  10 Apr 2020 11:24  --------------------------------------------------------  IN:  Total IN: 0 mL    OUT:    Indwelling Catheter - Urethral: 485 mL  Total OUT: 485 mL    Total NET: -485 mL        VENTILATOR SETTINGS:  Mode: AC/ CMV (Assist Control/ Continuous Mandatory Ventilation)  RR (machine): 30  TV (machine): 400  FiO2: 80  PEEP: 10  ITime: 1  MAP: 23  PIP: 33    ABG - ( 10 Apr 2020 03:05 )  pH, Arterial: 7.39  pH, Blood: x     /  pCO2: 40    /  pO2: 134   / HCO3: 24    / Base Excess: -1.3  /  SaO2: 99        PHYSICAL EXAM: Physical Exam performed by Intensivist to minimize risk of exposure.  Please refer to Attending Attestation for comprehensive exam.   General: No acute distress  Neuro: [Sedated]  CV: RRR  Pulm: [INTUBATED]  : [Villa]  Psych: affect appropriate    LABS:                         9.1    22.76 )-----------( 274      ( 10 Apr 2020 03:10 )             28.2   04-10    145  |  110<H>  |  37<H>  ----------------------------<  168<H>  4.6   |  23  |  0.95    Ca    8.2<L>      10 Apr 2020 03:10  Phos  4.4     04-10  Mg     2.6     04-10    TPro  5.8<L>  /  Alb  2.3<L>  /  TBili  1.7<H>  /  DBili  x   /  AST  57<H>  /  ALT  86<H>  /  AlkPhos  76  04-10    D-Dimer Assay, Quantitative: 1224 ng/mL DDU (04-10-20 @ 03:11)  Ferritin, Serum: 3413 ng/mL (04-10-20 @ 03:10)  Sedimentation Rate, Erythrocyte: 130 mm/Hr (04-10-20 @ 03:10)  ABG - ( 10 Apr 2020 03:05 )  pH, Arterial: 7.39  pH, Blood: x     /  pCO2: 40    /  pO2: 134   / HCO3: 24    / Base Excess: -1.3  /  SaO2: 99        POCT Blood Glucose.: 132 mg/dL (10 Apr 2020 06:48)  POCT Blood Glucose.: 117 mg/dL (09 Apr 2020 21:14)  POCT Blood Glucose.: 120 mg/dL (09 Apr 2020 17:25)     MEDICATIONS  (STANDING):  aspirin enteric coated 81 milliGRAM(s) Oral daily  chlorhexidine 0.12% Liquid 15 milliLiter(s) Oral Mucosa every 12 hours  cisatracurium Infusion 2 MICROgram(s)/kG/Min (7.2 mL/Hr) IV Continuous <Continuous>  clonazePAM  Tablet 1 milliGRAM(s) Oral every 6 hours  clopidogrel Tablet 75 milliGRAM(s) Oral daily  enoxaparin Injectable 40 milliGRAM(s) SubCutaneous every 24 hours  famotidine Injectable 20 milliGRAM(s) IV Push every 12 hours  insulin lispro (HumaLOG) corrective regimen sliding scale   SubCutaneous every 6 hours  methylPREDNISolone sodium succinate Injectable 40 milliGRAM(s) IV Push every 8 hours  petrolatum Ophthalmic Ointment 1 Application(s) Both EYES three times a day  piperacillin/tazobactam IVPB.. 3.375 Gram(s) IV Intermittent every 6 hours  propofol Infusion 5 MICROgram(s)/kG/Min (1.8 mL/Hr) IV Continuous <Continuous>  QUEtiapine 50 milliGRAM(s) Oral two times a day  vancomycin  IVPB 1000 milliGRAM(s) IV Intermittent every 12 hours    MEDICATIONS  (PRN):  acetaminophen    Suspension .. 650 milliGRAM(s) Oral every 6 hours PRN Temp greater or equal to 38C (100.4F)  ALBUTerol    90 MICROgram(s) HFA Inhaler 2 Puff(s) Inhalation every 4 hours PRN Shortness of Breath and/or Wheezing  dextrose 40% Gel 15 Gram(s) Oral once PRN Blood Glucose LESS THAN 70 milliGRAM(s)/deciliter  glucagon  Injectable 1 milliGRAM(s) IntraMuscular once PRN Glucose LESS THAN 70 milligrams/deciliter      RADIOLOGY/OTHER STUDIES:  < from: Xray Chest 1 View- PORTABLE-Urgent (04.10.20 @ 10:01) >  Findings: Endotracheal tube in satisfactory position. Left-sided central line and NG tube remain in place. There has been improvement in extensive bilateral infiltrates, which appear less opaque than on prior study. No pleural effusions. No pneumothorax.    Impression: 1. Endotracheal tube in satisfactory position.    2. Improvement in extensive bilateral infiltrates.

## 2020-04-11 LAB
ALBUMIN SERPL ELPH-MCNC: 2 G/DL — LOW (ref 3.3–5)
ALP SERPL-CCNC: 79 U/L — SIGNIFICANT CHANGE UP (ref 40–120)
ALT FLD-CCNC: 63 U/L — HIGH (ref 10–45)
ANION GAP SERPL CALC-SCNC: 8 MMOL/L — SIGNIFICANT CHANGE UP (ref 5–17)
AST SERPL-CCNC: 44 U/L — HIGH (ref 10–40)
BASE EXCESS BLDA CALC-SCNC: -0.6 MMOL/L — SIGNIFICANT CHANGE UP (ref -2–3)
BASE EXCESS BLDA CALC-SCNC: 0.3 MMOL/L — SIGNIFICANT CHANGE UP (ref -2–3)
BASOPHILS # BLD AUTO: 0.02 K/UL — SIGNIFICANT CHANGE UP (ref 0–0.2)
BASOPHILS NFR BLD AUTO: 0.1 % — SIGNIFICANT CHANGE UP (ref 0–2)
BILIRUB SERPL-MCNC: 1 MG/DL — SIGNIFICANT CHANGE UP (ref 0.2–1.2)
BUN SERPL-MCNC: 34 MG/DL — HIGH (ref 7–23)
CALCIUM SERPL-MCNC: 8.2 MG/DL — LOW (ref 8.4–10.5)
CHLORIDE SERPL-SCNC: 111 MMOL/L — HIGH (ref 96–108)
CK SERPL-CCNC: 82 U/L — SIGNIFICANT CHANGE UP (ref 30–200)
CO2 SERPL-SCNC: 24 MMOL/L — SIGNIFICANT CHANGE UP (ref 22–31)
CREAT SERPL-MCNC: 0.88 MG/DL — SIGNIFICANT CHANGE UP (ref 0.5–1.3)
CRP SERPL-MCNC: 48.89 MG/DL — HIGH (ref 0–0.4)
D DIMER BLD IA.RAPID-MCNC: 1158 NG/ML DDU — HIGH
D DIMER BLD IA.RAPID-MCNC: 1217 NG/ML DDU — HIGH
EOSINOPHIL # BLD AUTO: 0 K/UL — SIGNIFICANT CHANGE UP (ref 0–0.5)
EOSINOPHIL NFR BLD AUTO: 0 % — SIGNIFICANT CHANGE UP (ref 0–6)
ERYTHROCYTE [SEDIMENTATION RATE] IN BLOOD: >150 MM/HR — HIGH
FERRITIN SERPL-MCNC: 2626 NG/ML — HIGH (ref 30–400)
FIBRINOGEN PPP-MCNC: 1023 MG/DL — HIGH (ref 258–438)
GLUCOSE SERPL-MCNC: 172 MG/DL — HIGH (ref 70–99)
HCO3 BLDA-SCNC: 25 MMOL/L — SIGNIFICANT CHANGE UP (ref 21–28)
HCO3 BLDA-SCNC: 26 MMOL/L — SIGNIFICANT CHANGE UP (ref 21–28)
HCT VFR BLD CALC: 27.3 % — LOW (ref 39–50)
HGB BLD-MCNC: 8.7 G/DL — LOW (ref 13–17)
IMM GRANULOCYTES NFR BLD AUTO: 1.5 % — SIGNIFICANT CHANGE UP (ref 0–1.5)
LDH SERPL L TO P-CCNC: 407 U/L — HIGH (ref 50–242)
LYMPHOCYTES # BLD AUTO: 0.21 K/UL — LOW (ref 1–3.3)
LYMPHOCYTES # BLD AUTO: 1.2 % — LOW (ref 13–44)
MAGNESIUM SERPL-MCNC: 2.7 MG/DL — HIGH (ref 1.6–2.6)
MCHC RBC-ENTMCNC: 29.8 PG — SIGNIFICANT CHANGE UP (ref 27–34)
MCHC RBC-ENTMCNC: 31.9 GM/DL — LOW (ref 32–36)
MCV RBC AUTO: 93.5 FL — SIGNIFICANT CHANGE UP (ref 80–100)
MONOCYTES # BLD AUTO: 0.22 K/UL — SIGNIFICANT CHANGE UP (ref 0–0.9)
MONOCYTES NFR BLD AUTO: 1.2 % — LOW (ref 2–14)
NEUTROPHILS # BLD AUTO: 17.11 K/UL — HIGH (ref 1.8–7.4)
NEUTROPHILS NFR BLD AUTO: 96 % — HIGH (ref 43–77)
NRBC # BLD: 0 /100 WBCS — SIGNIFICANT CHANGE UP (ref 0–0)
NT-PROBNP SERPL-SCNC: 1556 PG/ML — HIGH (ref 0–300)
PCO2 BLDA: 44 MMHG — SIGNIFICANT CHANGE UP (ref 35–48)
PCO2 BLDA: 45 MMHG — SIGNIFICANT CHANGE UP (ref 35–48)
PH BLDA: 7.36 — SIGNIFICANT CHANGE UP (ref 7.35–7.45)
PH BLDA: 7.38 — SIGNIFICANT CHANGE UP (ref 7.35–7.45)
PHOSPHATE SERPL-MCNC: 2.8 MG/DL — SIGNIFICANT CHANGE UP (ref 2.5–4.5)
PLATELET # BLD AUTO: 290 K/UL — SIGNIFICANT CHANGE UP (ref 150–400)
PO2 BLDA: 101 MMHG — SIGNIFICANT CHANGE UP (ref 83–108)
PO2 BLDA: 57 MMHG — CRITICAL LOW (ref 83–108)
POTASSIUM SERPL-MCNC: 4 MMOL/L — SIGNIFICANT CHANGE UP (ref 3.5–5.3)
POTASSIUM SERPL-SCNC: 4 MMOL/L — SIGNIFICANT CHANGE UP (ref 3.5–5.3)
PROCALCITONIN SERPL-MCNC: 1.24 NG/ML — HIGH (ref 0.02–0.1)
PROT SERPL-MCNC: 6 G/DL — SIGNIFICANT CHANGE UP (ref 6–8.3)
RBC # BLD: 2.92 M/UL — LOW (ref 4.2–5.8)
RBC # FLD: 14.7 % — HIGH (ref 10.3–14.5)
SAO2 % BLDA: 90 % — LOW (ref 95–100)
SAO2 % BLDA: 98 % — SIGNIFICANT CHANGE UP (ref 95–100)
SODIUM SERPL-SCNC: 143 MMOL/L — SIGNIFICANT CHANGE UP (ref 135–145)
TROPONIN T SERPL-MCNC: <0.01 NG/ML — SIGNIFICANT CHANGE UP (ref 0–0.01)
WBC # BLD: 17.82 K/UL — HIGH (ref 3.8–10.5)
WBC # FLD AUTO: 17.82 K/UL — HIGH (ref 3.8–10.5)

## 2020-04-11 PROCEDURE — 71045 X-RAY EXAM CHEST 1 VIEW: CPT | Mod: 26,77

## 2020-04-11 PROCEDURE — 71045 X-RAY EXAM CHEST 1 VIEW: CPT | Mod: 26

## 2020-04-11 PROCEDURE — 99233 SBSQ HOSP IP/OBS HIGH 50: CPT | Mod: CS

## 2020-04-11 RX ORDER — FUROSEMIDE 40 MG
20 TABLET ORAL ONCE
Refills: 0 | Status: COMPLETED | OUTPATIENT
Start: 2020-04-11 | End: 2020-04-12

## 2020-04-11 RX ORDER — SENNA PLUS 8.6 MG/1
2 TABLET ORAL AT BEDTIME
Refills: 0 | Status: DISCONTINUED | OUTPATIENT
Start: 2020-04-11 | End: 2020-04-15

## 2020-04-11 RX ADMIN — Medication 1 MILLIGRAM(S): at 05:25

## 2020-04-11 RX ADMIN — PROPOFOL 1.8 MICROGRAM(S)/KG/MIN: 10 INJECTION, EMULSION INTRAVENOUS at 09:01

## 2020-04-11 RX ADMIN — QUETIAPINE FUMARATE 50 MILLIGRAM(S): 200 TABLET, FILM COATED ORAL at 05:31

## 2020-04-11 RX ADMIN — Medication 2: at 05:19

## 2020-04-11 RX ADMIN — PROPOFOL 1.8 MICROGRAM(S)/KG/MIN: 10 INJECTION, EMULSION INTRAVENOUS at 18:34

## 2020-04-11 RX ADMIN — Medication 1 MILLIGRAM(S): at 11:04

## 2020-04-11 RX ADMIN — Medication 40 MILLIGRAM(S): at 21:09

## 2020-04-11 RX ADMIN — Medication 2: at 13:50

## 2020-04-11 RX ADMIN — CHLORHEXIDINE GLUCONATE 15 MILLILITER(S): 213 SOLUTION TOPICAL at 05:24

## 2020-04-11 RX ADMIN — FAMOTIDINE 20 MILLIGRAM(S): 10 INJECTION INTRAVENOUS at 05:31

## 2020-04-11 RX ADMIN — FENTANYL CITRATE 3 MICROGRAM(S)/KG/HR: 50 INJECTION INTRAVENOUS at 09:01

## 2020-04-11 RX ADMIN — Medication 250 MILLIGRAM(S): at 21:09

## 2020-04-11 RX ADMIN — Medication 1 APPLICATION(S): at 21:05

## 2020-04-11 RX ADMIN — Medication 1 MILLIGRAM(S): at 18:12

## 2020-04-11 RX ADMIN — Medication 40 MILLIGRAM(S): at 05:25

## 2020-04-11 RX ADMIN — Medication 81 MILLIGRAM(S): at 11:06

## 2020-04-11 RX ADMIN — PIPERACILLIN AND TAZOBACTAM 200 GRAM(S): 4; .5 INJECTION, POWDER, LYOPHILIZED, FOR SOLUTION INTRAVENOUS at 05:24

## 2020-04-11 RX ADMIN — Medication 1 APPLICATION(S): at 13:51

## 2020-04-11 RX ADMIN — Medication 250 MILLIGRAM(S): at 09:02

## 2020-04-11 RX ADMIN — FENTANYL CITRATE 3 MICROGRAM(S)/KG/HR: 50 INJECTION INTRAVENOUS at 23:53

## 2020-04-11 RX ADMIN — Medication 1 APPLICATION(S): at 05:28

## 2020-04-11 RX ADMIN — ENOXAPARIN SODIUM 40 MILLIGRAM(S): 100 INJECTION SUBCUTANEOUS at 05:25

## 2020-04-11 RX ADMIN — CLOPIDOGREL BISULFATE 75 MILLIGRAM(S): 75 TABLET, FILM COATED ORAL at 11:04

## 2020-04-11 RX ADMIN — CHLORHEXIDINE GLUCONATE 15 MILLILITER(S): 213 SOLUTION TOPICAL at 18:13

## 2020-04-11 RX ADMIN — PROPOFOL 1.8 MICROGRAM(S)/KG/MIN: 10 INJECTION, EMULSION INTRAVENOUS at 13:54

## 2020-04-11 RX ADMIN — Medication 40 MILLIGRAM(S): at 13:55

## 2020-04-11 RX ADMIN — PROPOFOL 1.8 MICROGRAM(S)/KG/MIN: 10 INJECTION, EMULSION INTRAVENOUS at 01:58

## 2020-04-11 RX ADMIN — Medication 62.5 MILLIMOLE(S): at 18:13

## 2020-04-11 RX ADMIN — QUETIAPINE FUMARATE 50 MILLIGRAM(S): 200 TABLET, FILM COATED ORAL at 18:12

## 2020-04-11 RX ADMIN — PIPERACILLIN AND TAZOBACTAM 200 GRAM(S): 4; .5 INJECTION, POWDER, LYOPHILIZED, FOR SOLUTION INTRAVENOUS at 11:04

## 2020-04-11 RX ADMIN — FAMOTIDINE 20 MILLIGRAM(S): 10 INJECTION INTRAVENOUS at 18:12

## 2020-04-11 RX ADMIN — CISATRACURIUM BESYLATE 7.2 MICROGRAM(S)/KG/MIN: 2 INJECTION INTRAVENOUS at 10:53

## 2020-04-11 RX ADMIN — CHLORHEXIDINE GLUCONATE 1 APPLICATION(S): 213 SOLUTION TOPICAL at 05:20

## 2020-04-11 NOTE — PROGRESS NOTE ADULT - SUBJECTIVE AND OBJECTIVE BOX
SUBJECTIVE:  76 yo male with PMH of COPD. Admitted with resp failure s/t covid.     OVERNIGHT EVENTS:  SUZANNA overnight. Proned. On propofol, fentanyl and nimbex      Vital Signs Last 24 Hrs  T(C): 36.1 (11 Apr 2020 09:00), Max: 37.4 (10 Apr 2020 13:00)  T(F): 97 (11 Apr 2020 09:00), Max: 99.4 (10 Apr 2020 13:00)  HR: 77 (11 Apr 2020 08:00) (76 - 142)  BP: --  BP(mean): --  RR: 30 (11 Apr 2020 08:00) (30 - 30)  SpO2: 100% (11 Apr 2020 08:00) (92% - 100%)    04-10 @ 07:01  -  04-11 @ 07:00  --------------------------------------------------------  IN: 1503.6 mL / OUT: 2285 mL / NET: -781.4 mL    04-11 @ 07:01 - 04-11 @ 11:07  --------------------------------------------------------  IN: 46.8 mL / OUT: 50 mL / NET: -3.2 mL      04-10 @ 07:01  -  04-11 @ 07:00  --------------------------------------------------------  IN: 1503.6 mL / OUT: 2285 mL / NET: -781.4 mL    04-11 @ 07:01  -  04-11 @ 11:07  --------------------------------------------------------  IN: 46.8 mL / OUT: 50 mL / NET: -3.2 mL        PHYSICAL EXAM:   Physical Exam performed by Intensivist to minimize risk of exposure.  Please refer to Attending Attestation for comprehensive exam.   General: No acute distress  Neuro: [Sedated]  CV: RRR  Pulm: [INTUBATED]  : [Villa]  Psych: affect appropriate        LABS:  ABG - ( 11 Apr 2020 10:13 )  pH, Arterial: 7.36  pH, Blood: x     /  pCO2: 45    /  pO2: 57    / HCO3: 25    / Base Excess: -0.6  /  SaO2: 90            CBC Full  -  ( 11 Apr 2020 03:55 )  WBC Count : 17.82 K/uL  RBC Count : 2.92 M/uL  Hemoglobin : 8.7 g/dL  Hematocrit : 27.3 %  Platelet Count - Automated : 290 K/uL  Mean Cell Volume : 93.5 fl  Mean Cell Hemoglobin : 29.8 pg  Mean Cell Hemoglobin Concentration : 31.9 gm/dL  Auto Neutrophil # : 17.11 K/uL  Auto Lymphocyte # : 0.21 K/uL  Auto Monocyte # : 0.22 K/uL  Auto Eosinophil # : 0.00 K/uL  Auto Basophil # : 0.02 K/uL  Auto Neutrophil % : 96.0 %  Auto Lymphocyte % : 1.2 %  Auto Monocyte % : 1.2 %  Auto Eosinophil % : 0.0 %  Auto Basophil % : 0.1 %    04-11    143  |  111<H>  |  34<H>  ----------------------------<  172<H>  4.0   |  24  |  0.88    Ca    8.2<L>      11 Apr 2020 03:55  Phos  2.8     04-11  Mg     2.7     04-11    TPro  6.0  /  Alb  2.0<L>  /  TBili  1.0  /  DBili  x   /  AST  44<H>  /  ALT  63<H>  /  AlkPhos  79  04-11              RADIOLOGY & ADDITIONAL STUDIES:    ASSESSMENT AND PLAN:    75 year old M, PMHx  COPD (no hx exacerbations or intubations), CAD s/p multiple stents, hypertension who presented to [facility] ED on 4/2 due to SOB, cough, and fever for 1 week. Of note he had followed with Dr. Mera as an outpatient and completed a 5 day course of azithromycin and cefdinir prior to presentation. He was admitted for hypoxemic respiratory failure and respiratory distress and tested positive for COVID. The patient was intubated on 4/3 (current vent day 8). He completed course of plaquenil and azithromycin on 4/7. During his admission he was on vanc and zosyn for presumed HAP which was discontinued but restarted on 4/8 for empiric coverage given new fever, elevated WBC count.     1. Neurovascular:   -Intubation requiring sedation  -propofol, fentanyl, nimbex  -seroquel 50mg bid, klonopin 1mg q6    2. Respiratory: Acute hypoxemic respiratory failure 2/2 COVID.    -Date Intubated:4/3  -COVID pneumonia:     -Plaquenil 400mg BID x 2 doses then 200mg BID x 4 days  (completed 4/7)    -Azithromycin 250mg QD for hospital course, max 14-day dose (completed 4/7)     -Tocilizumab 8mg/kg for one dose if approved (not given)  -Vent settings: 60%/rr30/400/peep 10  -Continue vent weaning as tolerated    3. Cardiovascular: Pressor requirement 2/2 sedation/pain regimen.   -Daily EKG's to assess for QT prolongation  -SBP normotensive  -asa and plavix    4. GI:   -Nutrition: start bolus NGT feeds today  -Prophylaxis: Pepcid  -C/w bowel regimen    5. /Renal: +Villa  -BUN/Cr stable   -Monitor UOP  -Replete electrolytes as needed for goal K+ 4.0, Phos 3.0, Mg 2.0    6. ID:   -WBC downtrending  -4/8 blood cultures w/gram + clusters (coag neg staph)  -continue vanco and zosyn- f/u stop dates   -Continue with regular surveillance labs including CBC with diff, CMP, Mg, Phos, CRP, ABG, Ferritin, CK, Triglycerides, Procalcitonin, D-Dimer, EKG  -Additional labs q3d: ESR, T-cell subset, LDH, Ferritin, CK, Troponin, Coags  -COVID isolation protocol     7. Endo:  insulin ss    8. Heme:   -h/h stable   -DVT ppx: Lovenox SQ and SCDs    Full Code  Dispo: MICU  d/w Dr. Bower

## 2020-04-12 LAB
ALBUMIN SERPL ELPH-MCNC: 1.9 G/DL — LOW (ref 3.3–5)
ALP SERPL-CCNC: 89 U/L — SIGNIFICANT CHANGE UP (ref 40–120)
ALT FLD-CCNC: 48 U/L — HIGH (ref 10–45)
ANION GAP SERPL CALC-SCNC: 11 MMOL/L — SIGNIFICANT CHANGE UP (ref 5–17)
AST SERPL-CCNC: 31 U/L — SIGNIFICANT CHANGE UP (ref 10–40)
BASE EXCESS BLDA CALC-SCNC: -4 MMOL/L — LOW (ref -2–3)
BASE EXCESS BLDA CALC-SCNC: -5 MMOL/L — LOW (ref -2–3)
BASE EXCESS BLDA CALC-SCNC: -7.1 MMOL/L — LOW (ref -2–3)
BASOPHILS # BLD AUTO: 0 K/UL — SIGNIFICANT CHANGE UP (ref 0–0.2)
BASOPHILS NFR BLD AUTO: 0 % — SIGNIFICANT CHANGE UP (ref 0–2)
BILIRUB SERPL-MCNC: 0.8 MG/DL — SIGNIFICANT CHANGE UP (ref 0.2–1.2)
BUN SERPL-MCNC: 43 MG/DL — HIGH (ref 7–23)
CALCIUM SERPL-MCNC: 7.8 MG/DL — LOW (ref 8.4–10.5)
CHLORIDE SERPL-SCNC: 108 MMOL/L — SIGNIFICANT CHANGE UP (ref 96–108)
CO2 SERPL-SCNC: 23 MMOL/L — SIGNIFICANT CHANGE UP (ref 22–31)
CREAT SERPL-MCNC: 1.04 MG/DL — SIGNIFICANT CHANGE UP (ref 0.5–1.3)
CRP SERPL-MCNC: 33.78 MG/DL — HIGH (ref 0–0.4)
D DIMER BLD IA.RAPID-MCNC: 1517 NG/ML DDU — HIGH
EOSINOPHIL # BLD AUTO: 0 K/UL — SIGNIFICANT CHANGE UP (ref 0–0.5)
EOSINOPHIL NFR BLD AUTO: 0 % — SIGNIFICANT CHANGE UP (ref 0–6)
FERRITIN SERPL-MCNC: 2377 NG/ML — HIGH (ref 30–400)
GLUCOSE SERPL-MCNC: 284 MG/DL — HIGH (ref 70–99)
HCO3 BLDA-SCNC: 20 MMOL/L — LOW (ref 21–28)
HCO3 BLDA-SCNC: 21 MMOL/L — SIGNIFICANT CHANGE UP (ref 21–28)
HCO3 BLDA-SCNC: 22 MMOL/L — SIGNIFICANT CHANGE UP (ref 21–28)
HCT VFR BLD CALC: 26.4 % — LOW (ref 39–50)
HGB BLD-MCNC: 8.6 G/DL — LOW (ref 13–17)
LYMPHOCYTES # BLD AUTO: 0 % — LOW (ref 13–44)
LYMPHOCYTES # BLD AUTO: 0 K/UL — LOW (ref 1–3.3)
MAGNESIUM SERPL-MCNC: 2.7 MG/DL — HIGH (ref 1.6–2.6)
MCHC RBC-ENTMCNC: 30 PG — SIGNIFICANT CHANGE UP (ref 27–34)
MCHC RBC-ENTMCNC: 32.6 GM/DL — SIGNIFICANT CHANGE UP (ref 32–36)
MCV RBC AUTO: 92 FL — SIGNIFICANT CHANGE UP (ref 80–100)
MONOCYTES # BLD AUTO: 0.36 K/UL — SIGNIFICANT CHANGE UP (ref 0–0.9)
MONOCYTES NFR BLD AUTO: 2 % — SIGNIFICANT CHANGE UP (ref 2–14)
NEUTROPHILS # BLD AUTO: 17.71 K/UL — HIGH (ref 1.8–7.4)
NEUTROPHILS NFR BLD AUTO: 94.1 % — HIGH (ref 43–77)
PCO2 BLDA: 37 MMHG — SIGNIFICANT CHANGE UP (ref 35–48)
PCO2 BLDA: 46 MMHG — SIGNIFICANT CHANGE UP (ref 35–48)
PCO2 BLDA: 48 MMHG — SIGNIFICANT CHANGE UP (ref 35–48)
PH BLDA: 7.26 — LOW (ref 7.35–7.45)
PH BLDA: 7.28 — LOW (ref 7.35–7.45)
PH BLDA: 7.37 — SIGNIFICANT CHANGE UP (ref 7.35–7.45)
PHOSPHATE SERPL-MCNC: 4 MG/DL — SIGNIFICANT CHANGE UP (ref 2.5–4.5)
PLATELET # BLD AUTO: 302 K/UL — SIGNIFICANT CHANGE UP (ref 150–400)
PO2 BLDA: 57 MMHG — CRITICAL LOW (ref 83–108)
PO2 BLDA: 66 MMHG — LOW (ref 83–108)
PO2 BLDA: 77 MMHG — LOW (ref 83–108)
POTASSIUM SERPL-MCNC: 3.3 MMOL/L — LOW (ref 3.5–5.3)
POTASSIUM SERPL-SCNC: 3.3 MMOL/L — LOW (ref 3.5–5.3)
PROT SERPL-MCNC: 5.9 G/DL — LOW (ref 6–8.3)
RBC # BLD: 2.87 M/UL — LOW (ref 4.2–5.8)
RBC # FLD: 14.6 % — HIGH (ref 10.3–14.5)
SAO2 % BLDA: 87 % — LOW (ref 95–100)
SAO2 % BLDA: 90 % — LOW (ref 95–100)
SAO2 % BLDA: 96 % — SIGNIFICANT CHANGE UP (ref 95–100)
SODIUM SERPL-SCNC: 142 MMOL/L — SIGNIFICANT CHANGE UP (ref 135–145)
VANCOMYCIN TROUGH SERPL-MCNC: 22.3 UG/ML — HIGH (ref 10–20)
VANCOMYCIN TROUGH SERPL-MCNC: 26.8 UG/ML — CRITICAL HIGH (ref 10–20)
WBC # BLD: 18.07 K/UL — HIGH (ref 3.8–10.5)
WBC # FLD AUTO: 18.07 K/UL — HIGH (ref 3.8–10.5)

## 2020-04-12 PROCEDURE — 99291 CRITICAL CARE FIRST HOUR: CPT | Mod: 25

## 2020-04-12 PROCEDURE — 99292 CRITICAL CARE ADDL 30 MIN: CPT | Mod: 25

## 2020-04-12 PROCEDURE — 71045 X-RAY EXAM CHEST 1 VIEW: CPT | Mod: 26

## 2020-04-12 PROCEDURE — 31500 INSERT EMERGENCY AIRWAY: CPT

## 2020-04-12 RX ORDER — NOREPINEPHRINE BITARTRATE/D5W 8 MG/250ML
0.05 PLASTIC BAG, INJECTION (ML) INTRAVENOUS
Qty: 8 | Refills: 0 | Status: DISCONTINUED | OUTPATIENT
Start: 2020-04-12 | End: 2020-04-14

## 2020-04-12 RX ORDER — NOREPINEPHRINE BITARTRATE/D5W 8 MG/250ML
0.05 PLASTIC BAG, INJECTION (ML) INTRAVENOUS
Qty: 16 | Refills: 0 | Status: DISCONTINUED | OUTPATIENT
Start: 2020-04-12 | End: 2020-04-12

## 2020-04-12 RX ORDER — POTASSIUM CHLORIDE 20 MEQ
20 PACKET (EA) ORAL ONCE
Refills: 0 | Status: COMPLETED | OUTPATIENT
Start: 2020-04-12 | End: 2020-04-12

## 2020-04-12 RX ORDER — ENOXAPARIN SODIUM 100 MG/ML
60 INJECTION SUBCUTANEOUS EVERY 12 HOURS
Refills: 0 | Status: DISCONTINUED | OUTPATIENT
Start: 2020-04-12 | End: 2020-04-13

## 2020-04-12 RX ORDER — VASOPRESSIN 20 [USP'U]/ML
0.05 INJECTION INTRAVENOUS
Qty: 50 | Refills: 0 | Status: DISCONTINUED | OUTPATIENT
Start: 2020-04-12 | End: 2020-04-14

## 2020-04-12 RX ORDER — POTASSIUM CHLORIDE 20 MEQ
40 PACKET (EA) ORAL
Refills: 0 | Status: COMPLETED | OUTPATIENT
Start: 2020-04-12 | End: 2020-04-12

## 2020-04-12 RX ORDER — FUROSEMIDE 40 MG
20 TABLET ORAL ONCE
Refills: 0 | Status: COMPLETED | OUTPATIENT
Start: 2020-04-12 | End: 2020-04-12

## 2020-04-12 RX ADMIN — Medication 40 MILLIEQUIVALENT(S): at 05:20

## 2020-04-12 RX ADMIN — CISATRACURIUM BESYLATE 7.2 MICROGRAM(S)/KG/MIN: 2 INJECTION INTRAVENOUS at 19:12

## 2020-04-12 RX ADMIN — FENTANYL CITRATE 3 MICROGRAM(S)/KG/HR: 50 INJECTION INTRAVENOUS at 23:45

## 2020-04-12 RX ADMIN — PROPOFOL 1.8 MICROGRAM(S)/KG/MIN: 10 INJECTION, EMULSION INTRAVENOUS at 00:42

## 2020-04-12 RX ADMIN — Medication 1 MILLIGRAM(S): at 23:05

## 2020-04-12 RX ADMIN — Medication 1 MILLIGRAM(S): at 12:59

## 2020-04-12 RX ADMIN — Medication 100 MILLIEQUIVALENT(S): at 09:21

## 2020-04-12 RX ADMIN — Medication 40 MILLIGRAM(S): at 22:06

## 2020-04-12 RX ADMIN — CLOPIDOGREL BISULFATE 75 MILLIGRAM(S): 75 TABLET, FILM COATED ORAL at 12:59

## 2020-04-12 RX ADMIN — Medication 1 MILLIGRAM(S): at 00:41

## 2020-04-12 RX ADMIN — FAMOTIDINE 20 MILLIGRAM(S): 10 INJECTION INTRAVENOUS at 18:59

## 2020-04-12 RX ADMIN — Medication 650 MILLIGRAM(S): at 22:42

## 2020-04-12 RX ADMIN — SENNA PLUS 2 TABLET(S): 8.6 TABLET ORAL at 22:06

## 2020-04-12 RX ADMIN — Medication 6: at 00:39

## 2020-04-12 RX ADMIN — CISATRACURIUM BESYLATE 7.2 MICROGRAM(S)/KG/MIN: 2 INJECTION INTRAVENOUS at 06:53

## 2020-04-12 RX ADMIN — Medication 40 MILLIGRAM(S): at 05:00

## 2020-04-12 RX ADMIN — PROPOFOL 1.8 MICROGRAM(S)/KG/MIN: 10 INJECTION, EMULSION INTRAVENOUS at 19:12

## 2020-04-12 RX ADMIN — FENTANYL CITRATE 3 MICROGRAM(S)/KG/HR: 50 INJECTION INTRAVENOUS at 11:48

## 2020-04-12 RX ADMIN — Medication 1 APPLICATION(S): at 05:03

## 2020-04-12 RX ADMIN — Medication 1 MILLIGRAM(S): at 05:02

## 2020-04-12 RX ADMIN — Medication 40 MILLIGRAM(S): at 14:27

## 2020-04-12 RX ADMIN — Medication 40 MILLIEQUIVALENT(S): at 18:58

## 2020-04-12 RX ADMIN — CHLORHEXIDINE GLUCONATE 1 APPLICATION(S): 213 SOLUTION TOPICAL at 05:02

## 2020-04-12 RX ADMIN — ENOXAPARIN SODIUM 40 MILLIGRAM(S): 100 INJECTION SUBCUTANEOUS at 05:02

## 2020-04-12 RX ADMIN — Medication 5.63 MICROGRAM(S)/KG/MIN: at 06:30

## 2020-04-12 RX ADMIN — CHLORHEXIDINE GLUCONATE 15 MILLILITER(S): 213 SOLUTION TOPICAL at 18:59

## 2020-04-12 RX ADMIN — Medication 4: at 05:07

## 2020-04-12 RX ADMIN — QUETIAPINE FUMARATE 50 MILLIGRAM(S): 200 TABLET, FILM COATED ORAL at 05:02

## 2020-04-12 RX ADMIN — Medication 1 APPLICATION(S): at 16:30

## 2020-04-12 RX ADMIN — Medication 20 MILLIGRAM(S): at 09:21

## 2020-04-12 RX ADMIN — Medication 20 MILLIGRAM(S): at 00:41

## 2020-04-12 RX ADMIN — QUETIAPINE FUMARATE 50 MILLIGRAM(S): 200 TABLET, FILM COATED ORAL at 22:06

## 2020-04-12 RX ADMIN — PROPOFOL 1.8 MICROGRAM(S)/KG/MIN: 10 INJECTION, EMULSION INTRAVENOUS at 05:45

## 2020-04-12 RX ADMIN — Medication 1 MILLIGRAM(S): at 18:58

## 2020-04-12 RX ADMIN — ENOXAPARIN SODIUM 60 MILLIGRAM(S): 100 INJECTION SUBCUTANEOUS at 18:59

## 2020-04-12 RX ADMIN — FAMOTIDINE 20 MILLIGRAM(S): 10 INJECTION INTRAVENOUS at 05:02

## 2020-04-12 RX ADMIN — CHLORHEXIDINE GLUCONATE 15 MILLILITER(S): 213 SOLUTION TOPICAL at 05:02

## 2020-04-12 RX ADMIN — Medication 1 APPLICATION(S): at 21:47

## 2020-04-12 NOTE — PROGRESS NOTE ADULT - SUBJECTIVE AND OBJECTIVE BOX
SUBJECTIVE:  76 yo male with PMH of COPD. Admitted with resp failure s/t covid.     OVERNIGHT EVENTS:  Proned. On propofol, fentanyl and nimbex. Episode of hypotension, tachycardia- levophed started and sedation increased.        Vital Signs Last 24 Hrs  T(C): 36.4 (12 Apr 2020 09:00), Max: 37 (12 Apr 2020 05:00)  T(F): 97.5 (12 Apr 2020 09:00), Max: 98.6 (12 Apr 2020 05:00)  HR: 118 (12 Apr 2020 10:20) (71 - 123)  BP: --  BP(mean): --  RR: 30 (12 Apr 2020 09:00) (30 - 30)  SpO2: 90% (12 Apr 2020 10:20) (90% - 99%)    04-11 @ 07:01  -  04-12 @ 07:00  --------------------------------------------------------  IN: 1859.2 mL / OUT: 1737 mL / NET: 122.2 mL    04-12 @ 07:01 - 04-12 @ 11:20  --------------------------------------------------------  IN: 131.4 mL / OUT: 45 mL / NET: 86.4 mL      04-11 @ 07:01 - 04-12 @ 07:00  --------------------------------------------------------  IN: 1859.2 mL / OUT: 1737 mL / NET: 122.2 mL    04-12 @ 07:01  -  04-12 @ 11:20  --------------------------------------------------------  IN: 131.4 mL / OUT: 45 mL / NET: 86.4 mL      PHYSICAL EXAM:   Physical Exam performed by Intensivist to minimize risk of exposure.  Please refer to Attending Attestation for comprehensive exam.   General: No acute distress  Neuro: [Sedated]  CV: RRR  Pulm: [INTUBATED]  : [Villa]  Psych: affect appropriate        LABS:  ABG - ( 12 Apr 2020 02:47 )  pH, Arterial: 7.37  pH, Blood: x     /  pCO2: 37    /  pO2: 77    / HCO3: 21    / Base Excess: -4.0  /  SaO2: 96              CBC Full  -  ( 12 Apr 2020 03:04 )  WBC Count : 18.07 K/uL  RBC Count : 2.87 M/uL  Hemoglobin : 8.6 g/dL  Hematocrit : 26.4 %  Platelet Count - Automated : 302 K/uL  Mean Cell Volume : 92.0 fl  Mean Cell Hemoglobin : 30.0 pg  Mean Cell Hemoglobin Concentration : 32.6 gm/dL  Auto Neutrophil # : 17.71 K/uL  Auto Lymphocyte # : 0.00 K/uL  Auto Monocyte # : 0.36 K/uL  Auto Eosinophil # : 0.00 K/uL  Auto Basophil # : 0.00 K/uL  Auto Neutrophil % : 94.1 %  Auto Lymphocyte % : 0.0 %  Auto Monocyte % : 2.0 %  Auto Eosinophil % : 0.0 %  Auto Basophil % : 0.0 %    04-12    142  |  108  |  43<H>  ----------------------------<  284<H>  3.3<L>   |  23  |  1.04    Ca    7.8<L>      12 Apr 2020 03:04  Phos  4.0     04-12  Mg     2.7     04-12    TPro  5.9<L>  /  Alb  1.9<L>  /  TBili  0.8  /  DBili  x   /  AST  31  /  ALT  48<H>  /  AlkPhos  89  04-12                    RADIOLOGY & ADDITIONAL STUDIES:        ASSESSMENT AND PLAN:    75 year old M, PMHx  COPD (no hx exacerbations or intubations), CAD s/p multiple stents, hypertension who presented to [facility] ED on 4/2 due to SOB, cough, and fever for 1 week. Of note he had followed with Dr. Mera as an outpatient and completed a 5 day course of azithromycin and cefdinir prior to presentation. He was admitted for hypoxemic respiratory failure and respiratory distress and tested positive for COVID. The patient was intubated on 4/3 (current vent day 8). He completed course of plaquenil and azithromycin on 4/7. During his admission he was on vanc and zosyn for presumed HAP which was discontinued but restarted on 4/8 for empiric coverage given new fever, elevated WBC count.     1. Neurovascular:   -Intubation requiring sedation  -propofol, fentanyl, nimbex  -seroquel 50mg bid, klonopin 1mg q6    2. Respiratory: Acute hypoxemic respiratory failure 2/2 COVID.    -Date Intubated:4/3  -COVID pneumonia:     -Plaquenil 400mg BID x 2 doses then 200mg BID x 4 days  (completed 4/7)    -Azithromycin 250mg QD for hospital course, max 14-day dose (completed 4/7)     -Tocilizumab 8mg/kg for one dose if approved (not given)    3. Cardiovascular: Pressor requirement 2/2 sedation/pain regimen.   -Daily EKG's to assess for QT prolongation  -SBP normotensive- Levophed prn  -plavix 75  -ASA dc'd (starting therapeutic SQL)    4. GI:   -Nutrition: NGT feeds  -Prophylaxis: Pepcid  -C/w bowel regimen    5. /Renal: +Villa  -s/p Lasix  -BUN/Cr stable   -Monitor UOP  -Replete electrolytes as needed for goal K+ 4.0, Phos 3.0, Mg 2.0    6. ID:   -WBC stable  -4/8 blood cultures w/gram + clusters (coag neg staph)- repeat blood cultures today  -continue vanco (zosyn dc'd)- f/u am vanco trough  -Continue with regular surveillance labs including CBC with diff, CMP, Mg, Phos, CRP, ABG, Ferritin, CK, Triglycerides, Procalcitonin, D-Dimer, EKG  -Additional labs q3d: ESR, T-cell subset, LDH, Ferritin, CK, Troponin, Coags  -COVID isolation protocol     7. Endo:  insulin ss    8. Heme:   -h/h stable   -switch SQL to therapeutic based on D Dimer   -f/u LE dopplers     Full Code  Dispo: [facility]  d/w Dr. Ibanez

## 2020-04-13 LAB
-  CANDIDA ALBICANS: SIGNIFICANT CHANGE UP
ALBUMIN SERPL ELPH-MCNC: 1.7 G/DL — LOW (ref 3.3–5)
ALBUMIN SERPL ELPH-MCNC: 2.1 G/DL — LOW (ref 3.3–5)
ALP SERPL-CCNC: 105 U/L — SIGNIFICANT CHANGE UP (ref 40–120)
ALP SERPL-CCNC: 97 U/L — SIGNIFICANT CHANGE UP (ref 40–120)
ALT FLD-CCNC: 49 U/L — HIGH (ref 10–45)
ALT FLD-CCNC: 63 U/L — HIGH (ref 10–45)
ANION GAP SERPL CALC-SCNC: 10 MMOL/L — SIGNIFICANT CHANGE UP (ref 5–17)
ANION GAP SERPL CALC-SCNC: 10 MMOL/L — SIGNIFICANT CHANGE UP (ref 5–17)
AST SERPL-CCNC: 34 U/L — SIGNIFICANT CHANGE UP (ref 10–40)
AST SERPL-CCNC: 56 U/L — HIGH (ref 10–40)
BASE EXCESS BLDA CALC-SCNC: -3.4 MMOL/L — LOW (ref -2–3)
BASE EXCESS BLDA CALC-SCNC: -5.6 MMOL/L — LOW (ref -2–3)
BASOPHILS # BLD AUTO: 0 K/UL — SIGNIFICANT CHANGE UP (ref 0–0.2)
BASOPHILS NFR BLD AUTO: 0 % — SIGNIFICANT CHANGE UP (ref 0–2)
BILIRUB SERPL-MCNC: 1.1 MG/DL — SIGNIFICANT CHANGE UP (ref 0.2–1.2)
BILIRUB SERPL-MCNC: 1.8 MG/DL — HIGH (ref 0.2–1.2)
BUN SERPL-MCNC: 57 MG/DL — HIGH (ref 7–23)
BUN SERPL-MCNC: 59 MG/DL — HIGH (ref 7–23)
CALCIUM SERPL-MCNC: 7.7 MG/DL — LOW (ref 8.4–10.5)
CALCIUM SERPL-MCNC: 8 MG/DL — LOW (ref 8.4–10.5)
CHLORIDE SERPL-SCNC: 112 MMOL/L — HIGH (ref 96–108)
CHLORIDE SERPL-SCNC: 112 MMOL/L — HIGH (ref 96–108)
CO2 SERPL-SCNC: 22 MMOL/L — SIGNIFICANT CHANGE UP (ref 22–31)
CO2 SERPL-SCNC: 22 MMOL/L — SIGNIFICANT CHANGE UP (ref 22–31)
CREAT SERPL-MCNC: 1.64 MG/DL — HIGH (ref 0.5–1.3)
CREAT SERPL-MCNC: 1.65 MG/DL — HIGH (ref 0.5–1.3)
CRP SERPL-MCNC: 26.71 MG/DL — HIGH (ref 0–0.4)
D DIMER BLD IA.RAPID-MCNC: 1885 NG/ML DDU — HIGH
EOSINOPHIL # BLD AUTO: 0 K/UL — SIGNIFICANT CHANGE UP (ref 0–0.5)
EOSINOPHIL NFR BLD AUTO: 0 % — SIGNIFICANT CHANGE UP (ref 0–6)
FERRITIN SERPL-MCNC: 2231 NG/ML — HIGH (ref 30–400)
GAS PNL BLDA: SIGNIFICANT CHANGE UP
GAS PNL BLDA: SIGNIFICANT CHANGE UP
GLUCOSE SERPL-MCNC: 183 MG/DL — HIGH (ref 70–99)
GLUCOSE SERPL-MCNC: 213 MG/DL — HIGH (ref 70–99)
GRAM STN FLD: SIGNIFICANT CHANGE UP
GRAM STN FLD: SIGNIFICANT CHANGE UP
HCO3 BLDA-SCNC: 21 MMOL/L — SIGNIFICANT CHANGE UP (ref 21–28)
HCO3 BLDA-SCNC: 23 MMOL/L — SIGNIFICANT CHANGE UP (ref 21–28)
HCT VFR BLD CALC: 25.1 % — LOW (ref 39–50)
HCT VFR BLD CALC: 27.3 % — LOW (ref 39–50)
HGB BLD-MCNC: 7.7 G/DL — LOW (ref 13–17)
HGB BLD-MCNC: 8.4 G/DL — LOW (ref 13–17)
LYMPHOCYTES # BLD AUTO: 0.16 K/UL — LOW (ref 1–3.3)
LYMPHOCYTES # BLD AUTO: 0.9 % — LOW (ref 13–44)
MAGNESIUM SERPL-MCNC: 2.6 MG/DL — SIGNIFICANT CHANGE UP (ref 1.6–2.6)
MCHC RBC-ENTMCNC: 29.4 PG — SIGNIFICANT CHANGE UP (ref 27–34)
MCHC RBC-ENTMCNC: 29.6 PG — SIGNIFICANT CHANGE UP (ref 27–34)
MCHC RBC-ENTMCNC: 30.7 GM/DL — LOW (ref 32–36)
MCHC RBC-ENTMCNC: 30.8 GM/DL — LOW (ref 32–36)
MCV RBC AUTO: 95.8 FL — SIGNIFICANT CHANGE UP (ref 80–100)
MCV RBC AUTO: 96.1 FL — SIGNIFICANT CHANGE UP (ref 80–100)
METHOD TYPE: SIGNIFICANT CHANGE UP
MONOCYTES # BLD AUTO: 0 K/UL — SIGNIFICANT CHANGE UP (ref 0–0.9)
MONOCYTES NFR BLD AUTO: 0 % — LOW (ref 2–14)
NEUTROPHILS # BLD AUTO: 17.35 K/UL — HIGH (ref 1.8–7.4)
NEUTROPHILS NFR BLD AUTO: 98.2 % — HIGH (ref 43–77)
NRBC # BLD: 0 /100 WBCS — SIGNIFICANT CHANGE UP (ref 0–0)
PCO2 BLDA: 49 MMHG — HIGH (ref 35–48)
PCO2 BLDA: 50 MMHG — HIGH (ref 35–48)
PH BLDA: 7.26 — LOW (ref 7.35–7.45)
PH BLDA: 7.28 — LOW (ref 7.35–7.45)
PHOSPHATE SERPL-MCNC: 4.5 MG/DL — SIGNIFICANT CHANGE UP (ref 2.5–4.5)
PLATELET # BLD AUTO: 291 K/UL — SIGNIFICANT CHANGE UP (ref 150–400)
PLATELET # BLD AUTO: 331 K/UL — SIGNIFICANT CHANGE UP (ref 150–400)
PO2 BLDA: 59 MMHG — LOW (ref 83–108)
PO2 BLDA: 77 MMHG — LOW (ref 83–108)
POTASSIUM SERPL-MCNC: 5.2 MMOL/L — SIGNIFICANT CHANGE UP (ref 3.5–5.3)
POTASSIUM SERPL-MCNC: 5.5 MMOL/L — HIGH (ref 3.5–5.3)
POTASSIUM SERPL-SCNC: 5.2 MMOL/L — SIGNIFICANT CHANGE UP (ref 3.5–5.3)
POTASSIUM SERPL-SCNC: 5.5 MMOL/L — HIGH (ref 3.5–5.3)
PROCALCITONIN SERPL-MCNC: 2.29 NG/ML — HIGH (ref 0.02–0.1)
PROT SERPL-MCNC: 5.8 G/DL — LOW (ref 6–8.3)
PROT SERPL-MCNC: 6.1 G/DL — SIGNIFICANT CHANGE UP (ref 6–8.3)
RBC # BLD: 2.62 M/UL — LOW (ref 4.2–5.8)
RBC # BLD: 2.84 M/UL — LOW (ref 4.2–5.8)
RBC # FLD: 15.1 % — HIGH (ref 10.3–14.5)
RBC # FLD: 15.2 % — HIGH (ref 10.3–14.5)
SAO2 % BLDA: 87 % — LOW (ref 95–100)
SAO2 % BLDA: 94 % — LOW (ref 95–100)
SODIUM SERPL-SCNC: 144 MMOL/L — SIGNIFICANT CHANGE UP (ref 135–145)
SODIUM SERPL-SCNC: 144 MMOL/L — SIGNIFICANT CHANGE UP (ref 135–145)
TRIGL SERPL-MCNC: 448 MG/DL — HIGH (ref 10–149)
VANCOMYCIN FLD-MCNC: 17.5 UG/ML — SIGNIFICANT CHANGE UP
WBC # BLD: 14.4 K/UL — HIGH (ref 3.8–10.5)
WBC # BLD: 17.51 K/UL — HIGH (ref 3.8–10.5)
WBC # FLD AUTO: 14.4 K/UL — HIGH (ref 3.8–10.5)
WBC # FLD AUTO: 17.51 K/UL — HIGH (ref 3.8–10.5)

## 2020-04-13 PROCEDURE — 93970 EXTREMITY STUDY: CPT | Mod: 26,CS

## 2020-04-13 RX ORDER — VANCOMYCIN HCL 1 G
1000 VIAL (EA) INTRAVENOUS ONCE
Refills: 0 | Status: COMPLETED | OUTPATIENT
Start: 2020-04-13 | End: 2020-04-13

## 2020-04-13 RX ORDER — CASPOFUNGIN ACETATE 7 MG/ML
50 INJECTION, POWDER, LYOPHILIZED, FOR SOLUTION INTRAVENOUS ONCE
Refills: 0 | Status: COMPLETED | OUTPATIENT
Start: 2020-04-13 | End: 2020-04-13

## 2020-04-13 RX ORDER — CASPOFUNGIN ACETATE 7 MG/ML
50 INJECTION, POWDER, LYOPHILIZED, FOR SOLUTION INTRAVENOUS EVERY 24 HOURS
Refills: 0 | Status: DISCONTINUED | OUTPATIENT
Start: 2020-04-14 | End: 2020-04-15

## 2020-04-13 RX ORDER — ALBUMIN HUMAN 25 %
250 VIAL (ML) INTRAVENOUS EVERY 4 HOURS
Refills: 0 | Status: COMPLETED | OUTPATIENT
Start: 2020-04-13 | End: 2020-04-13

## 2020-04-13 RX ORDER — HEPARIN SODIUM 5000 [USP'U]/ML
5000 INJECTION INTRAVENOUS; SUBCUTANEOUS EVERY 8 HOURS
Refills: 0 | Status: DISCONTINUED | OUTPATIENT
Start: 2020-04-13 | End: 2020-04-14

## 2020-04-13 RX ADMIN — FAMOTIDINE 20 MILLIGRAM(S): 10 INJECTION INTRAVENOUS at 05:08

## 2020-04-13 RX ADMIN — QUETIAPINE FUMARATE 50 MILLIGRAM(S): 200 TABLET, FILM COATED ORAL at 17:31

## 2020-04-13 RX ADMIN — Medication 1 MILLIGRAM(S): at 12:21

## 2020-04-13 RX ADMIN — PROPOFOL 1.8 MICROGRAM(S)/KG/MIN: 10 INJECTION, EMULSION INTRAVENOUS at 12:20

## 2020-04-13 RX ADMIN — CISATRACURIUM BESYLATE 7.2 MICROGRAM(S)/KG/MIN: 2 INJECTION INTRAVENOUS at 23:32

## 2020-04-13 RX ADMIN — Medication 2: at 17:54

## 2020-04-13 RX ADMIN — Medication 40 MILLIGRAM(S): at 05:08

## 2020-04-13 RX ADMIN — CASPOFUNGIN ACETATE 260 MILLIGRAM(S): 7 INJECTION, POWDER, LYOPHILIZED, FOR SOLUTION INTRAVENOUS at 21:40

## 2020-04-13 RX ADMIN — PROPOFOL 1.8 MICROGRAM(S)/KG/MIN: 10 INJECTION, EMULSION INTRAVENOUS at 17:31

## 2020-04-13 RX ADMIN — PROPOFOL 1.8 MICROGRAM(S)/KG/MIN: 10 INJECTION, EMULSION INTRAVENOUS at 00:34

## 2020-04-13 RX ADMIN — Medication 2: at 12:48

## 2020-04-13 RX ADMIN — CHLORHEXIDINE GLUCONATE 15 MILLILITER(S): 213 SOLUTION TOPICAL at 05:08

## 2020-04-13 RX ADMIN — CLOPIDOGREL BISULFATE 75 MILLIGRAM(S): 75 TABLET, FILM COATED ORAL at 12:20

## 2020-04-13 RX ADMIN — PROPOFOL 1.8 MICROGRAM(S)/KG/MIN: 10 INJECTION, EMULSION INTRAVENOUS at 05:47

## 2020-04-13 RX ADMIN — Medication 40 MILLIGRAM(S): at 12:49

## 2020-04-13 RX ADMIN — SENNA PLUS 2 TABLET(S): 8.6 TABLET ORAL at 21:42

## 2020-04-13 RX ADMIN — CHLORHEXIDINE GLUCONATE 1 APPLICATION(S): 213 SOLUTION TOPICAL at 05:09

## 2020-04-13 RX ADMIN — HEPARIN SODIUM 5000 UNIT(S): 5000 INJECTION INTRAVENOUS; SUBCUTANEOUS at 21:42

## 2020-04-13 RX ADMIN — CHLORHEXIDINE GLUCONATE 15 MILLILITER(S): 213 SOLUTION TOPICAL at 17:31

## 2020-04-13 RX ADMIN — ENOXAPARIN SODIUM 60 MILLIGRAM(S): 100 INJECTION SUBCUTANEOUS at 05:08

## 2020-04-13 RX ADMIN — Medication 40 MILLIGRAM(S): at 21:40

## 2020-04-13 RX ADMIN — Medication 1 APPLICATION(S): at 21:42

## 2020-04-13 RX ADMIN — HEPARIN SODIUM 5000 UNIT(S): 5000 INJECTION INTRAVENOUS; SUBCUTANEOUS at 12:48

## 2020-04-13 RX ADMIN — Medication 1 MILLIGRAM(S): at 05:09

## 2020-04-13 RX ADMIN — Medication 1 APPLICATION(S): at 12:49

## 2020-04-13 RX ADMIN — Medication 1 MILLIGRAM(S): at 17:31

## 2020-04-13 RX ADMIN — QUETIAPINE FUMARATE 50 MILLIGRAM(S): 200 TABLET, FILM COATED ORAL at 05:08

## 2020-04-13 RX ADMIN — Medication 4: at 05:47

## 2020-04-13 RX ADMIN — FAMOTIDINE 20 MILLIGRAM(S): 10 INJECTION INTRAVENOUS at 17:31

## 2020-04-13 RX ADMIN — Medication 50 MILLILITER(S): at 09:40

## 2020-04-13 RX ADMIN — CISATRACURIUM BESYLATE 7.2 MICROGRAM(S)/KG/MIN: 2 INJECTION INTRAVENOUS at 12:20

## 2020-04-13 RX ADMIN — Medication 250 MILLIGRAM(S): at 12:20

## 2020-04-13 RX ADMIN — Medication 5 MILLIGRAM(S): at 21:42

## 2020-04-13 RX ADMIN — Medication 1 MILLIGRAM(S): at 23:33

## 2020-04-13 RX ADMIN — FENTANYL CITRATE 3 MICROGRAM(S)/KG/HR: 50 INJECTION INTRAVENOUS at 22:01

## 2020-04-13 RX ADMIN — Medication 50 MILLILITER(S): at 14:29

## 2020-04-13 RX ADMIN — Medication 1 APPLICATION(S): at 05:09

## 2020-04-13 RX ADMIN — FENTANYL CITRATE 3 MICROGRAM(S)/KG/HR: 50 INJECTION INTRAVENOUS at 14:13

## 2020-04-13 NOTE — CHART NOTE - NSCHARTNOTEFT_GEN_A_CORE
Admitting Diagnosis:   Patient is a 75y old  Male who presents with a chief complaint of shortness of breath s/t covid (13 Apr 2020 12:29)      PAST MEDICAL & SURGICAL HISTORY:  Essential hypertension: Hypertension  Transient ischemic attack (TIA), and cerebral infarction without residual deficits: History of CVA (cerebrovascular accident) without residual deficits  Hyperlipidemia: Hyperlipidemia  Benign prostatic hypertrophy without lower urinary tract symptoms: BPH (benign prostatic hypertrophy)  Chronic obstructive pulmonary disease: COPD (chronic obstructive pulmonary disease)  Atherosclerosis of coronary artery: Coronary artery disease  Disease of pericardium: Pericardial effusion without cardiac tamponade  Constipation: Constipation  H/O heart artery stent  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 Successful PTCA KARAN pLAD &amp; PTCA ostial D1.      Current Nutrition Order:  Jevity 1.5 Kristopher @ 24mL/hr x 24hrs plus ProStat BID (200 kcal, 30g protein) via NGT. Provides: 576mL TV, 1064kcal (+1587kcal incl. prop), 67g pro, 437mL free H2O.      PO Intake: Good (%) [   ]  Fair (50-75%) [   ] Poor (<25%) [   ]- NA NPO w/EN    GI Issues: Unable to assess at this time 2/2 vent  BM 4/12    Pain: Unable to assess at this time 2/2 vent; sedated    Skin Integrity: Ayush 10, intact pressure-wise     Labs:   04-13    144  |  112<H>  |  57<H>  ----------------------------<  183<H>  5.5<H>   |  22  |  1.65<H>    Ca    7.7<L>      13 Apr 2020 02:39  Phos  4.5     04-13  Mg     2.6     04-13    TPro  5.8<L>  /  Alb  1.7<L>  /  TBili  1.8<H>  /  DBili  x   /  AST  34  /  ALT  49<H>  /  AlkPhos  97  04-13    CAPILLARY BLOOD GLUCOSE      POCT Blood Glucose.: 193 mg/dL (13 Apr 2020 12:26)  POCT Blood Glucose.: 204 mg/dL (13 Apr 2020 05:10)  POCT Blood Glucose.: 80 mg/dL (12 Apr 2020 22:53)      Medications:  MEDICATIONS  (STANDING):  bisacodyl 5 milliGRAM(s) Oral at bedtime  chlorhexidine 0.12% Liquid 15 milliLiter(s) Oral Mucosa every 12 hours  chlorhexidine 2% Cloths 1 Application(s) Topical <User Schedule>  cisatracurium Infusion 2 MICROgram(s)/kG/Min (7.2 mL/Hr) IV Continuous <Continuous>  clonazePAM  Tablet 1 milliGRAM(s) Oral every 6 hours  clopidogrel Tablet 75 milliGRAM(s) Oral daily  dextrose 5%. 1000 milliLiter(s) (50 mL/Hr) IV Continuous <Continuous>  dextrose 50% Injectable 12.5 Gram(s) IV Push once  dextrose 50% Injectable 25 Gram(s) IV Push once  dextrose 50% Injectable 25 Gram(s) IV Push once  famotidine Injectable 20 milliGRAM(s) IV Push every 12 hours  fentaNYL   Infusion. 0.5 MICROgram(s)/kG/Hr (3 mL/Hr) IV Continuous <Continuous>  heparin  Injectable 5000 Unit(s) SubCutaneous every 8 hours  insulin lispro (HumaLOG) corrective regimen sliding scale   SubCutaneous every 6 hours  methylPREDNISolone sodium succinate Injectable 40 milliGRAM(s) IV Push every 8 hours  norepinephrine Infusion 0.05 MICROgram(s)/kG/Min (5.63 mL/Hr) IV Continuous <Continuous>  petrolatum Ophthalmic Ointment 1 Application(s) Both EYES three times a day  propofol Infusion 5 MICROgram(s)/kG/Min (1.8 mL/Hr) IV Continuous <Continuous>  QUEtiapine 50 milliGRAM(s) Oral two times a day  senna 2 Tablet(s) Oral at bedtime  vasopressin Infusion 0.05 Unit(s)/Min (3 mL/Hr) IV Continuous <Continuous>    MEDICATIONS  (PRN):  acetaminophen    Suspension .. 650 milliGRAM(s) Oral every 6 hours PRN Temp greater or equal to 38C (100.4F)  ALBUTerol    90 MICROgram(s) HFA Inhaler 2 Puff(s) Inhalation every 4 hours PRN Shortness of Breath and/or Wheezing  dextrose 40% Gel 15 Gram(s) Oral once PRN Blood Glucose LESS THAN 70 milliGRAM(s)/deciliter  glucagon  Injectable 1 milliGRAM(s) IntraMuscular once PRN Glucose LESS THAN 70 milligrams/deciliter      Weight: 60kg  Daily     Daily     Weight Change: No new weights     Nutrition Focused Physical Exam: Completed [   ]  Not Pertinent [  X ]    Estimated energy needs: Height 62"; ABW 60kg; IBW 53.6kg; 112%IBW; BMI 24.2  IBW used to estimate needs 2/2 vent. needs estimated for age and adjusted for vent, COVID infection. fluids per team 2/2 respiratory distress  Calories: 25-30 kcal/kg = 2834-2159 kcal/day  Protein: 1.4-1.6 g/kg = 75-86g protein/day  Fluids per team     Subjective: 76 yo/male with PMHx COPD, HTN, CAD, presented w/SOB, cough, fever x 1 week. Septic likely 2/2 COVID and underlying bacterial pna. Pt initially on tele-unit but became tachypneic and distended while on BiPAP and was ultimately intubated for airway protection. Pt deproned on 4/7; proned on 4/12 and de-proned again this morning. Pt discussed w/NP. Unable to conduct a face to face interview or nutrition-focused physical exam due to limited contact restrictions related to the pt's medical condition and isolation precautions. Currently supine. Pt is intubated on VC/AC mode, sedated on propofol @ 19.8mL/hr (523kcal/day from lipids) and fentanyl. -not requiring pressors at this time. Paralyzed on nimbex to improve vent synchrony. NPO w/EN running at 24mL/hr. BM 4/12. Tmax 1001.F overnight; trending down. Noted K 5.5 (H), BUN 57/Cr 1.65,  (H). Will continue to follow per RD protocol.     Previous Nutrition Diagnosis:  Increased protein-calorie needs RT increased demand for protein-calorie intake AEB ventilator, COVID infection  Active [X   ]  Resolved [   ]    If resolved, new PES:     Goal: Pt will consistently meet % of EER via tolerated route     Recommendations:  1. Continue w/current EN order. Monitor for s/s intolerance; maintain aspiration precautions at all times. Additional free H2O flushes per team.  *will continue to closely monitor labs and will change formula as necessary   2. Monitor lytes and replete prn. POC BG q6hrs   3. Pain and bowel regimens per team     Education: NA- intubated, sedated    Risk Level: High [ X  ] Moderate [   ] Low [   ].

## 2020-04-13 NOTE — PROGRESS NOTE ADULT - SUBJECTIVE AND OBJECTIVE BOX
Patient Discussed on Morning Rounds with Dr. Bower    Primary Diagnosis: COVID Pneumonia    SUBJECTIVE:  75yMale with PMH of CAD s/p stent and COPD, Covid positive, intubated o 4/3.     Interval Events:  Patient proned overnight. Supinated at 9am today. Remains off vasopressors.   OBJECTIVE:  Vitals: ICU Vital Signs Last 24 Hrs  T(C): 36.4 (13 Apr 2020 11:11), Max: 37.8 (13 Apr 2020 01:00)  T(F): 97.6 (13 Apr 2020 11:11), Max: 100.1 (13 Apr 2020 01:00)  HR: 116 (13 Apr 2020 09:00) (116 - 141)  BP: --  BP(mean): --  ABP: 111/63 (13 Apr 2020 09:00) (94/57 - 170/86)  ABP(mean): 84 (13 Apr 2020 09:00) (74 - 119)  RR: 30 (13 Apr 2020 09:00) (14 - 30)  SpO2: 94% (13 Apr 2020 09:00) (88% - 96%)    I&O:  I&O's Detail    12 Apr 2020 07:01  -  13 Apr 2020 07:00  --------------------------------------------------------  IN:    cisatracurium Infusion: 302.4 mL    fentaNYL Infusion.: 441 mL    norepinephrine Infusion: 23 mL    ns in tub fed  [id number]: 420 mL    propofol Infusion: 378 mL  Total IN: 1564.4 mL    OUT:    Indwelling Catheter - Urethral: 1875 mL  Total OUT: 1875 mL    Total NET: -310.6 mL      13 Apr 2020 07:01  -  13 Apr 2020 12:29  --------------------------------------------------------  IN:    Albumin 5%  - 250 mL: 200 mL    cisatracurium Infusion: 72 mL    fentaNYL Infusion.: 105 mL    ns in tub fed  [id number]: 100 mL    propofol Infusion: 95.4 mL  Total IN: 572.4 mL    OUT:  Total OUT: 0 mL    Total NET: 572.4 mL        VENTILATOR SETTINGS:  Mode: AC   60%//RR 30/Peep 10      ABG - ( 13 Apr 2020 03:03 )  pH, Arterial: 7.26  pH, Blood: x     /  pCO2: 49    /  pO2: 77    / HCO3: 21    / Base Excess: -5.6  /  SaO2: 94          PHYSICAL EXAM: Physical Exam performed by Intensivist to minimize risk of exposure.  Please refer to Attending Attestation for comprehensive exam.   General: No acute distress  Neuro: [Sedated]  CV: RRR  Pulm: [INTUBATED]  : [Villa]  Psych: affect appropriate    LABS:                         8.4    17.51 )-----------( 331      ( 13 Apr 2020 02:40 )             27.3   04-13    144  |  112<H>  |  57<H>  ----------------------------<  183<H>  5.5<H>   |  22  |  1.65<H>    Ca    7.7<L>      13 Apr 2020 02:39  Phos  4.5     04-13  Mg     2.6     04-13    TPro  5.8<L>  /  Alb  1.7<L>  /  TBili  1.8<H>  /  DBili  x   /  AST  34  /  ALT  49<H>  /  AlkPhos  97  04-13    Ferritin, Serum: 2231 ng/mL (04-13-20 @ 02:39)  Triglycerides, Serum: 448 mg/dL (04-13-20 @ 02:39)  D-Dimer Assay, Quantitative: 1885 ng/mL DDU (04-13-20 @ 02:38)  ABG - ( 13 Apr 2020 03:03 )  pH, Arterial: 7.26  pH, Blood: x     /  pCO2: 49    /  pO2: 77    / HCO3: 21    / Base Excess: -5.6  /  SaO2: 94        CAPILLARY BLOOD GLUCOSE    POCT Blood Glucose.: 193 mg/dL (13 Apr 2020 12:26)  POCT Blood Glucose.: 204 mg/dL (13 Apr 2020 05:10)  POCT Blood Glucose.: 80 mg/dL (12 Apr 2020 22:53)  POCT Blood Glucose.: 110 mg/dL (12 Apr 2020 13:05)     MEDICATIONS  (STANDING):  albumin human  5% IVPB 250 milliLiter(s) IV Intermittent every 4 hours  bisacodyl 5 milliGRAM(s) Oral at bedtime  chlorhexidine 0.12% Liquid 15 milliLiter(s) Oral Mucosa every 12 hours  chlorhexidine 2% Cloths 1 Application(s) Topical <User Schedule>  cisatracurium Infusion 2 MICROgram(s)/kG/Min (7.2 mL/Hr) IV Continuous <Continuous>  clonazePAM  Tablet 1 milliGRAM(s) Oral every 6 hours  clopidogrel Tablet 75 milliGRAM(s) Oral daily  dextrose 5%. 1000 milliLiter(s) (50 mL/Hr) IV Continuous <Continuous>  dextrose 50% Injectable 12.5 Gram(s) IV Push once  dextrose 50% Injectable 25 Gram(s) IV Push once  dextrose 50% Injectable 25 Gram(s) IV Push once  famotidine Injectable 20 milliGRAM(s) IV Push every 12 hours  fentaNYL   Infusion. 0.5 MICROgram(s)/kG/Hr (3 mL/Hr) IV Continuous <Continuous>  heparin  Injectable 5000 Unit(s) SubCutaneous every 8 hours  insulin lispro (HumaLOG) corrective regimen sliding scale   SubCutaneous every 6 hours  methylPREDNISolone sodium succinate Injectable 40 milliGRAM(s) IV Push every 8 hours  norepinephrine Infusion 0.05 MICROgram(s)/kG/Min (5.63 mL/Hr) IV Continuous <Continuous>  petrolatum Ophthalmic Ointment 1 Application(s) Both EYES three times a day  propofol Infusion 5 MICROgram(s)/kG/Min (1.8 mL/Hr) IV Continuous <Continuous>  QUEtiapine 50 milliGRAM(s) Oral two times a day  senna 2 Tablet(s) Oral at bedtime  vasopressin Infusion 0.05 Unit(s)/Min (3 mL/Hr) IV Continuous <Continuous>    MEDICATIONS  (PRN):  acetaminophen    Suspension .. 650 milliGRAM(s) Oral every 6 hours PRN Temp greater or equal to 38C (100.4F)  ALBUTerol    90 MICROgram(s) HFA Inhaler 2 Puff(s) Inhalation every 4 hours PRN Shortness of Breath and/or Wheezing  dextrose 40% Gel 15 Gram(s) Oral once PRN Blood Glucose LESS THAN 70 milliGRAM(s)/deciliter  glucagon  Injectable 1 milliGRAM(s) IntraMuscular once PRN Glucose LESS THAN 70 milligrams/deciliter      RADIOLOGY/OTHER STUDIES:    < from: Xray Chest 1 View- PORTABLE-Urgent (04.12.20 @ 15:55) >  A portable frontal view of the chest is compared to the prior study dated 4/11/2020. Tip of ET tube overlies the ashkan. Left IJ line overlies the SVC. Gastric tube extends the abdomen. Stable heart size. Persistent hazy and patchy bilateral pulmonary infiltrates with basilar predominance. Cannot exclude small effusions. No pneumothorax.    IMPRESSION: ET tube overlies the ashkan and may be pulled back about 1.5 cm.

## 2020-04-13 NOTE — PROGRESS NOTE ADULT - ASSESSMENT
75 year old M, PMHx  COPD (no hx exacerbations or intubations), CAD s/p multiple stents, hypertension who presented to [facility] ED on 4/2 due to SOB, cough, and fever for 1 week. Of note he had followed with Dr. Mera as an outpatient and completed a 5 day course of azithromycin and cefdinir prior to presentation. He was admitted for hypoxemic respiratory failure and respiratory distress and tested positive for COVID. The patient was intubated on 4/3 (current vent day 7). He completed course of plaquenil and azithromycin on 4/7. During his admission he was on vanc and zosyn for presumed HAP which was discontinued but restarted  on 4/8 for empiric coverage given new fever, elevated WBC count. Overnight patient was prone. This morning  ETTube reposition, with improvement of of O2 Sats.     1. Neurovascular: No delirium  -Intubation requiring sedation  -Drips:  fentanyl, propofol  seroquel 50mg BID  -PRNs:  -Monitor neuro status    2. Respiratory: Acute hypoxemic respiratory failure 2/2 COVID.    -Date Intubated:4/3  -COVID pneumonia:     -Plaquenil 400mg BID x 2 doses then 200mg BID x 4 days  (completed 4/7)    -Azithromycin 250mg QD for hospital course, max 14-day dose (completed 4/7)     -Tocilizumab 8mg/kg for one dose if approved (not given)    -Ascorbic Acid and Thiamine  -Vent settings: 60%/rr30/400/peep 10  -Position: supine  -CXR:  -Continue vent weaning as tolerated    3. Cardiovascular: Pressor requirement 2/2 sedation/pain regimen.   -Daily EKG's to assess for QT prolongation    -QTc:   -asa and plavix  -Monitor HR/BP/Tele    4. GI: NPO  -Nutrition: Jevity 1.5 @20cc/hr--**on hold while prone  -Prophylaxis: Pepcid  -C/w bowel regimen    5. /Renal: +Villa  -BUN/Cr: 37/0.95  -Trend Cr on AM labs  -Monitor UOP  -Replete electrolytes as needed for goal K+ 4.0, Phos 3.0, Mg 2.0    6. ID:   -WBC:   27.7  --4/8 blood cultures w/gram + clusters  --continue vanco and zosyn  --check am vanco trough  -Continue with regular surveillance labs including CBC with diff, CMP, Mg, Phos, CRP, ABG, Ferritin, CK, Triglycerides, Procalcitonin, D-Dimer, EKG  -Additional labs q3d: ESR, T-cell subset, LDH, Ferritin, CK, Troponin, Coags  -COVID isolation protocol     7. Endo:  insulin ss    8. Heme:   -H/H:9/28  -Continue to monitor on daily and q3d labs as above.  -DVT ppx: Lovenox SQ and SCDs  [NO HEPARIN SQ]    Full Code    Critical Care Time Spent: 70mins 75 year old M, PMHx  COPD (no hx exacerbations or intubations), CAD s/p multiple stents, hypertension who presented to [facility] ED on 4/2 due to SOB, cough, and fever for 1 week. Of note he had followed with Dr. Mera as an outpatient and completed a 5 day course of azithromycin and cefdinir prior to presentation. He was admitted for hypoxemic respiratory failure and respiratory distress and tested positive for COVID. The patient was intubated on 4/3 (current vent day 7). He completed course of plaquenil and azithromycin on 4/7. During his admission he was on vanc and zosyn for presumed HAP which was discontinued but restarted  on 4/8 for empiric coverage given new fever, elevated WBC count. Overnight patient was prone. This morning  ETTube reposition, with improvement of of O2 Sats.     1. Neurovascular: No delirium  -Intubation requiring sedation  -Drips:  fentanyl, propofol  seroquel 50mg BID  -PRNs:  -Monitor neuro status    2. Respiratory: Acute hypoxemic respiratory failure 2/2 COVID.    -Date Intubated:4/3  -COVID pneumonia:     -Plaquenil 400mg BID x 2 doses then 200mg BID x 4 days  (completed 4/7)    -Azithromycin 250mg QD for hospital course, max 14-day dose (completed 4/7)     -Tocilizumab 8mg/kg for one dose if approved (not given)    -Ascorbic Acid and Thiamine  -Vent settings: 60%/rr30/400/peep 10  -Position: supine  -CXR:  -Continue vent weaning as tolerated    3. Cardiovascular: Pressor requirement 2/2 sedation/pain regimen.   -Daily EKG's to assess for QT prolongation    -QTc:   -asa and plavix  -Monitor HR/BP/Tele    4. GI: NPO  -Nutrition: Jevity 1.5 @24cc/hr  -Prophylaxis: Pepcid  -C/w bowel regimen  -check pre albumin in AM    5. /Renal: +Villa  -BUN/Cr: 57/1.6  -Trend Cr on AM labs  -Monitor UOP  -Replete electrolytes as needed for goal K+ 4.0, Phos 3.0, Mg 2.0    6. ID:   -WBC:   17.5  --4/8 blood cultures w/gram + clusters  --continue vanco by level  --check am vanco trough  -Continue with regular surveillance labs including CBC with diff, CMP, Mg, Phos, CRP, ABG, Ferritin, CK, Triglycerides, Procalcitonin, D-Dimer, EKG  -Additional labs q3d: ESR, T-cell subset, LDH, Ferritin, CK, Troponin, Coags  -COVID isolation protocol     7. Endo:  insulin ss  Albumin 50cc/hr x 10 hours    8. Heme:   -H/H: 8.4/27  -Continue to monitor on daily and q3d labs as above.  -DVT ppx: dc Lovenox--start Heparin 5000u SQ q12h  --Venous duplex today negative for DVT    Full Code    Critical Care Time Spent: 70mins

## 2020-04-14 LAB
ALBUMIN SERPL ELPH-MCNC: 2.2 G/DL — LOW (ref 3.3–5)
ALBUMIN SERPL ELPH-MCNC: 2.3 G/DL — LOW (ref 3.3–5)
ALP SERPL-CCNC: 108 U/L — SIGNIFICANT CHANGE UP (ref 40–120)
ALP SERPL-CCNC: 89 U/L — SIGNIFICANT CHANGE UP (ref 40–120)
ALT FLD-CCNC: 59 U/L — HIGH (ref 10–45)
ALT FLD-CCNC: 62 U/L — HIGH (ref 10–45)
ANION GAP SERPL CALC-SCNC: 11 MMOL/L — SIGNIFICANT CHANGE UP (ref 5–17)
ANION GAP SERPL CALC-SCNC: 9 MMOL/L — SIGNIFICANT CHANGE UP (ref 5–17)
AST SERPL-CCNC: 51 U/L — HIGH (ref 10–40)
AST SERPL-CCNC: 51 U/L — HIGH (ref 10–40)
BASE EXCESS BLDA CALC-SCNC: -2.7 MMOL/L — LOW (ref -2–3)
BASOPHILS # BLD AUTO: 0 K/UL — SIGNIFICANT CHANGE UP (ref 0–0.2)
BASOPHILS # BLD AUTO: 0.02 K/UL — SIGNIFICANT CHANGE UP (ref 0–0.2)
BASOPHILS NFR BLD AUTO: 0 % — SIGNIFICANT CHANGE UP (ref 0–2)
BASOPHILS NFR BLD AUTO: 0.1 % — SIGNIFICANT CHANGE UP (ref 0–2)
BILIRUB SERPL-MCNC: 0.7 MG/DL — SIGNIFICANT CHANGE UP (ref 0.2–1.2)
BILIRUB SERPL-MCNC: 0.8 MG/DL — SIGNIFICANT CHANGE UP (ref 0.2–1.2)
BLD GP AB SCN SERPL QL: NEGATIVE — SIGNIFICANT CHANGE UP
BUN SERPL-MCNC: 55 MG/DL — HIGH (ref 7–23)
BUN SERPL-MCNC: 59 MG/DL — HIGH (ref 7–23)
CALCIUM SERPL-MCNC: 7.8 MG/DL — LOW (ref 8.4–10.5)
CALCIUM SERPL-MCNC: 7.9 MG/DL — LOW (ref 8.4–10.5)
CHLORIDE SERPL-SCNC: 114 MMOL/L — HIGH (ref 96–108)
CHLORIDE SERPL-SCNC: 114 MMOL/L — HIGH (ref 96–108)
CO2 SERPL-SCNC: 21 MMOL/L — LOW (ref 22–31)
CO2 SERPL-SCNC: 22 MMOL/L — SIGNIFICANT CHANGE UP (ref 22–31)
CREAT SERPL-MCNC: 1.47 MG/DL — HIGH (ref 0.5–1.3)
CREAT SERPL-MCNC: 1.56 MG/DL — HIGH (ref 0.5–1.3)
CRP SERPL-MCNC: 29.19 MG/DL — HIGH (ref 0–0.4)
D DIMER BLD IA.RAPID-MCNC: 2223 NG/ML DDU — HIGH
EOSINOPHIL # BLD AUTO: 0.01 K/UL — SIGNIFICANT CHANGE UP (ref 0–0.5)
EOSINOPHIL # BLD AUTO: 0.18 K/UL — SIGNIFICANT CHANGE UP (ref 0–0.5)
EOSINOPHIL NFR BLD AUTO: 0.1 % — SIGNIFICANT CHANGE UP (ref 0–6)
EOSINOPHIL NFR BLD AUTO: 1 % — SIGNIFICANT CHANGE UP (ref 0–6)
ERYTHROCYTE [SEDIMENTATION RATE] IN BLOOD: >130 MM/HR — HIGH
FERRITIN SERPL-MCNC: 1896 NG/ML — HIGH (ref 30–400)
FIBRINOGEN PPP-MCNC: 789 MG/DL — HIGH (ref 258–438)
GAS PNL BLDA: SIGNIFICANT CHANGE UP
GAS PNL BLDA: SIGNIFICANT CHANGE UP
GLUCOSE SERPL-MCNC: 107 MG/DL — HIGH (ref 70–99)
GLUCOSE SERPL-MCNC: 154 MG/DL — HIGH (ref 70–99)
GRAM STN FLD: SIGNIFICANT CHANGE UP
GRAM STN FLD: SIGNIFICANT CHANGE UP
HCO3 BLDA-SCNC: 23 MMOL/L — SIGNIFICANT CHANGE UP (ref 21–28)
HCT VFR BLD CALC: 23.2 % — LOW (ref 39–50)
HCT VFR BLD CALC: 28.3 % — LOW (ref 39–50)
HGB BLD-MCNC: 7.5 G/DL — LOW (ref 13–17)
HGB BLD-MCNC: 8.8 G/DL — LOW (ref 13–17)
IMM GRANULOCYTES NFR BLD AUTO: 1.7 % — HIGH (ref 0–1.5)
LDH SERPL L TO P-CCNC: 348 U/L — HIGH (ref 50–242)
LYMPHOCYTES # BLD AUTO: 0.25 K/UL — LOW (ref 1–3.3)
LYMPHOCYTES # BLD AUTO: 0.72 K/UL — LOW (ref 1–3.3)
LYMPHOCYTES # BLD AUTO: 1.7 % — LOW (ref 13–44)
LYMPHOCYTES # BLD AUTO: 4 % — LOW (ref 13–44)
MAGNESIUM SERPL-MCNC: 2.7 MG/DL — HIGH (ref 1.6–2.6)
MAGNESIUM SERPL-MCNC: 2.8 MG/DL — HIGH (ref 1.6–2.6)
MCHC RBC-ENTMCNC: 29.7 PG — SIGNIFICANT CHANGE UP (ref 27–34)
MCHC RBC-ENTMCNC: 30.4 PG — SIGNIFICANT CHANGE UP (ref 27–34)
MCHC RBC-ENTMCNC: 31.1 GM/DL — LOW (ref 32–36)
MCHC RBC-ENTMCNC: 32.3 GM/DL — SIGNIFICANT CHANGE UP (ref 32–36)
MCV RBC AUTO: 93.9 FL — SIGNIFICANT CHANGE UP (ref 80–100)
MCV RBC AUTO: 95.6 FL — SIGNIFICANT CHANGE UP (ref 80–100)
MONOCYTES # BLD AUTO: 0.25 K/UL — SIGNIFICANT CHANGE UP (ref 0–0.9)
MONOCYTES # BLD AUTO: 0.72 K/UL — SIGNIFICANT CHANGE UP (ref 0–0.9)
MONOCYTES NFR BLD AUTO: 1.7 % — LOW (ref 2–14)
MONOCYTES NFR BLD AUTO: 4 % — SIGNIFICANT CHANGE UP (ref 2–14)
NEUTROPHILS # BLD AUTO: 13.52 K/UL — HIGH (ref 1.8–7.4)
NEUTROPHILS # BLD AUTO: 15.56 K/UL — HIGH (ref 1.8–7.4)
NEUTROPHILS NFR BLD AUTO: 85 % — HIGH (ref 43–77)
NEUTROPHILS NFR BLD AUTO: 94.7 % — HIGH (ref 43–77)
NRBC # BLD: 0 /100 WBCS — SIGNIFICANT CHANGE UP (ref 0–0)
NRBC # BLD: SIGNIFICANT CHANGE UP /100 WBCS (ref 0–0)
PCO2 BLDA: 44 MMHG — SIGNIFICANT CHANGE UP (ref 35–48)
PH BLDA: 7.33 — LOW (ref 7.35–7.45)
PHOSPHATE SERPL-MCNC: 3.5 MG/DL — SIGNIFICANT CHANGE UP (ref 2.5–4.5)
PHOSPHATE SERPL-MCNC: 3.6 MG/DL — SIGNIFICANT CHANGE UP (ref 2.5–4.5)
PLATELET # BLD AUTO: 274 K/UL — SIGNIFICANT CHANGE UP (ref 150–400)
PLATELET # BLD AUTO: 342 K/UL — SIGNIFICANT CHANGE UP (ref 150–400)
PO2 BLDA: 87 MMHG — SIGNIFICANT CHANGE UP (ref 83–108)
POTASSIUM SERPL-MCNC: 5.4 MMOL/L — HIGH (ref 3.5–5.3)
POTASSIUM SERPL-MCNC: 5.4 MMOL/L — HIGH (ref 3.5–5.3)
POTASSIUM SERPL-SCNC: 5.4 MMOL/L — HIGH (ref 3.5–5.3)
POTASSIUM SERPL-SCNC: 5.4 MMOL/L — HIGH (ref 3.5–5.3)
PREALB SERPL-MCNC: 10 MG/DL — LOW (ref 20–40)
PROCALCITONIN SERPL-MCNC: 1.95 NG/ML — HIGH (ref 0.02–0.1)
PROT SERPL-MCNC: 5.7 G/DL — LOW (ref 6–8.3)
PROT SERPL-MCNC: 6.2 G/DL — SIGNIFICANT CHANGE UP (ref 6–8.3)
RBC # BLD: 2.47 M/UL — LOW (ref 4.2–5.8)
RBC # BLD: 2.96 M/UL — LOW (ref 4.2–5.8)
RBC # FLD: 15.3 % — HIGH (ref 10.3–14.5)
RBC # FLD: 15.3 % — HIGH (ref 10.3–14.5)
RH IG SCN BLD-IMP: POSITIVE — SIGNIFICANT CHANGE UP
SAO2 % BLDA: 97 % — SIGNIFICANT CHANGE UP (ref 95–100)
SODIUM SERPL-SCNC: 145 MMOL/L — SIGNIFICANT CHANGE UP (ref 135–145)
SODIUM SERPL-SCNC: 146 MMOL/L — HIGH (ref 135–145)
SPECIMEN SOURCE: SIGNIFICANT CHANGE UP
VANCOMYCIN FLD-MCNC: 22.3 UG/ML — SIGNIFICANT CHANGE UP
WBC # BLD: 14.29 K/UL — HIGH (ref 3.8–10.5)
WBC # BLD: 17.88 K/UL — HIGH (ref 3.8–10.5)
WBC # FLD AUTO: 14.29 K/UL — HIGH (ref 3.8–10.5)
WBC # FLD AUTO: 17.88 K/UL — HIGH (ref 3.8–10.5)

## 2020-04-14 PROCEDURE — 71045 X-RAY EXAM CHEST 1 VIEW: CPT | Mod: 26

## 2020-04-14 PROCEDURE — 99233 SBSQ HOSP IP/OBS HIGH 50: CPT | Mod: CS

## 2020-04-14 PROCEDURE — 71045 X-RAY EXAM CHEST 1 VIEW: CPT | Mod: 26,77

## 2020-04-14 RX ORDER — ALBUMIN HUMAN 25 %
250 VIAL (ML) INTRAVENOUS ONCE
Refills: 0 | Status: COMPLETED | OUTPATIENT
Start: 2020-04-14 | End: 2020-04-14

## 2020-04-14 RX ORDER — CALCIUM GLUCONATE 100 MG/ML
2 VIAL (ML) INTRAVENOUS ONCE
Refills: 0 | Status: COMPLETED | OUTPATIENT
Start: 2020-04-14 | End: 2020-04-14

## 2020-04-14 RX ORDER — ACETAMINOPHEN 500 MG
1000 TABLET ORAL ONCE
Refills: 0 | Status: COMPLETED | OUTPATIENT
Start: 2020-04-14 | End: 2020-04-14

## 2020-04-14 RX ORDER — FENTANYL CITRATE 50 UG/ML
0.5 INJECTION INTRAVENOUS
Qty: 2500 | Refills: 0 | Status: DISCONTINUED | OUTPATIENT
Start: 2020-04-15 | End: 2020-04-15

## 2020-04-14 RX ORDER — METOPROLOL TARTRATE 50 MG
5 TABLET ORAL ONCE
Refills: 0 | Status: COMPLETED | OUTPATIENT
Start: 2020-04-14 | End: 2020-04-14

## 2020-04-14 RX ORDER — SODIUM BICARBONATE 1 MEQ/ML
50 SYRINGE (ML) INTRAVENOUS ONCE
Refills: 0 | Status: COMPLETED | OUTPATIENT
Start: 2020-04-14 | End: 2020-04-14

## 2020-04-14 RX ORDER — VASOPRESSIN 20 [USP'U]/ML
0.02 INJECTION INTRAVENOUS
Qty: 50 | Refills: 0 | Status: DISCONTINUED | OUTPATIENT
Start: 2020-04-14 | End: 2020-04-15

## 2020-04-14 RX ORDER — AMIODARONE HYDROCHLORIDE 400 MG/1
150 TABLET ORAL ONCE
Refills: 0 | Status: COMPLETED | OUTPATIENT
Start: 2020-04-14 | End: 2020-04-14

## 2020-04-14 RX ORDER — ENOXAPARIN SODIUM 100 MG/ML
40 INJECTION SUBCUTANEOUS EVERY 24 HOURS
Refills: 0 | Status: DISCONTINUED | OUTPATIENT
Start: 2020-04-14 | End: 2020-04-15

## 2020-04-14 RX ADMIN — Medication 2: at 06:24

## 2020-04-14 RX ADMIN — VASOPRESSIN 1 UNIT(S)/MIN: 20 INJECTION INTRAVENOUS at 19:15

## 2020-04-14 RX ADMIN — CISATRACURIUM BESYLATE 7.2 MICROGRAM(S)/KG/MIN: 2 INJECTION INTRAVENOUS at 14:05

## 2020-04-14 RX ADMIN — PROPOFOL 1.8 MICROGRAM(S)/KG/MIN: 10 INJECTION, EMULSION INTRAVENOUS at 14:03

## 2020-04-14 RX ADMIN — FENTANYL CITRATE 3 MICROGRAM(S)/KG/HR: 50 INJECTION INTRAVENOUS at 14:03

## 2020-04-14 RX ADMIN — CASPOFUNGIN ACETATE 260 MILLIGRAM(S): 7 INJECTION, POWDER, LYOPHILIZED, FOR SOLUTION INTRAVENOUS at 17:16

## 2020-04-14 RX ADMIN — AMIODARONE HYDROCHLORIDE 600 MILLIGRAM(S): 400 TABLET ORAL at 14:38

## 2020-04-14 RX ADMIN — Medication 200 GRAM(S): at 19:15

## 2020-04-14 RX ADMIN — FAMOTIDINE 20 MILLIGRAM(S): 10 INJECTION INTRAVENOUS at 19:15

## 2020-04-14 RX ADMIN — Medication 400 MILLIGRAM(S): at 17:14

## 2020-04-14 RX ADMIN — Medication 5 MILLIGRAM(S): at 17:14

## 2020-04-14 RX ADMIN — Medication 50 MILLIEQUIVALENT(S): at 19:15

## 2020-04-14 RX ADMIN — HEPARIN SODIUM 5000 UNIT(S): 5000 INJECTION INTRAVENOUS; SUBCUTANEOUS at 07:06

## 2020-04-14 RX ADMIN — CLOPIDOGREL BISULFATE 75 MILLIGRAM(S): 75 TABLET, FILM COATED ORAL at 12:42

## 2020-04-14 RX ADMIN — CHLORHEXIDINE GLUCONATE 15 MILLILITER(S): 213 SOLUTION TOPICAL at 07:06

## 2020-04-14 RX ADMIN — PROPOFOL 1.8 MICROGRAM(S)/KG/MIN: 10 INJECTION, EMULSION INTRAVENOUS at 09:25

## 2020-04-14 RX ADMIN — Medication 1 APPLICATION(S): at 22:14

## 2020-04-14 RX ADMIN — CISATRACURIUM BESYLATE 7.2 MICROGRAM(S)/KG/MIN: 2 INJECTION INTRAVENOUS at 09:24

## 2020-04-14 RX ADMIN — SENNA PLUS 2 TABLET(S): 8.6 TABLET ORAL at 22:14

## 2020-04-14 RX ADMIN — CHLORHEXIDINE GLUCONATE 1 APPLICATION(S): 213 SOLUTION TOPICAL at 07:06

## 2020-04-14 RX ADMIN — FAMOTIDINE 20 MILLIGRAM(S): 10 INJECTION INTRAVENOUS at 07:06

## 2020-04-14 RX ADMIN — Medication 1 APPLICATION(S): at 12:43

## 2020-04-14 RX ADMIN — QUETIAPINE FUMARATE 50 MILLIGRAM(S): 200 TABLET, FILM COATED ORAL at 17:15

## 2020-04-14 RX ADMIN — QUETIAPINE FUMARATE 50 MILLIGRAM(S): 200 TABLET, FILM COATED ORAL at 07:06

## 2020-04-14 RX ADMIN — Medication 2: at 00:52

## 2020-04-14 RX ADMIN — ENOXAPARIN SODIUM 40 MILLIGRAM(S): 100 INJECTION SUBCUTANEOUS at 12:43

## 2020-04-14 RX ADMIN — CHLORHEXIDINE GLUCONATE 15 MILLILITER(S): 213 SOLUTION TOPICAL at 17:15

## 2020-04-14 RX ADMIN — Medication 125 MILLILITER(S): at 19:15

## 2020-04-14 RX ADMIN — Medication 1 MILLIGRAM(S): at 07:06

## 2020-04-14 NOTE — ADVANCED PRACTICE NURSE CONSULT - RECOMMEDATIONS
Recommended Triad to sites on cheeks and ears, let dry to protect areas, foam dressings over shoulders and other bony prominences when being proned. Spoke with EASTON Brown and house staff.

## 2020-04-14 NOTE — CONSULT NOTE ADULT - ASSESSMENT
IMPRESSION:  Candida albicans fungemia.  Unclear source. Could be line related vs UTI    Recommend:  1.  Continue Caspofungin 50 mg IV daily  2.  Check repeat blood cultures tomorrow  3.  Check TTE  4. Agree with line removal. Please send cath tip for culture  5.  Will need optho eval next week  6.  Check UA with urine culture    ID team 1 will follow

## 2020-04-14 NOTE — PROCEDURE NOTE - NSSITEPREP_SKIN_A_CORE
chlorhexidine/Triple site preped, air dry for three minutes,  sterile technique maintained/Adherence to aseptic technique: hand hygiene prior to donning barriers (gown, gloves), don cap and mask, sterile drape over patient chlorhexidine/Adherence to aseptic technique: hand hygiene prior to donning barriers (gown, gloves), don cap and mask, sterile drape over patient/Triple site prepped, air dried for three minutes,  sterile technique maintained, vital signs remained stable throughout procedure.

## 2020-04-14 NOTE — PROCEDURE NOTE - NSPOSTPRCRAD_GEN_A_CORE
central line located in the superior vena cava/post-procedure radiography performed/15 cm/no pneumothorax/depth of insertion central line located in the superior vena cava/post-procedure radiography performed/no pneumothorax/depth of insertion

## 2020-04-14 NOTE — CONSULT NOTE ADULT - SUBJECTIVE AND OBJECTIVE BOX
HPI:  75M PMH of COPD (no history of exacerbations or intubations), CAD s/p multiple stents, hypertension who presents due to shortness of breath, cough, and fever for 1 week. Patient follows with Dr. Mera as an outpatient and completed 5 days of azithromycin and cefdinir, last dose 2 days ago and his shortness of breath worsened.    ED:   Vitals: T 100.8 /84  RR 38 O2 86% on 4L NC  Labs: Wbc 13.87 Lymphopenic 0.61 Hb 12.8 Plt 238 Na 139 K 3.0 Cl 86 Anion gap 21 BUN/Cr 25/1.22 AST/ALT 62/97 CRP 32.07 Ferritin 1214  Procal 0.35 Lactate 3.4  D-dimer 542, COVID positive  EKG: sinus tachycardia, no acute ischemic changes,   Chest XRAY: bilateral patchy consolidations  Interventions: Azithromycin 500mg IVx1, Zosyn 3.375gx1, Solumedrol 125mg IVx1, Tylenol 650mg x1, Mg Sulfate 2g, Potassium chloride 49qwly0, 750cc normal saline (02 Apr 2020 23:15)      Patient presented on 4/2. He was intubated on 4/3.  A central line and A-line were placed on 4/3.  He was started on plaquenil on 4/3 (had completed azithromycin prior to arrival).  Tocilizumab was not given.  On 4/8 he began to spike fevers.  Vancomycin and Zosyn were started for possible VAP.  Zosyn was continued until 4/11.  Vancomycin continued until 4/13.  Blood cultures from 4/8 showed coag negative staph in one bottle.  Blood cultures from 4/12 showed yeast.  ID consulted for further recommendations.  Central line being changed on 4/14      PAST MEDICAL & SURGICAL HISTORY:  Essential hypertension: Hypertension  Transient ischemic attack (TIA), and cerebral infarction without residual deficits: History of CVA (cerebrovascular accident) without residual deficits  Hyperlipidemia: Hyperlipidemia  Benign prostatic hypertrophy without lower urinary tract symptoms: BPH (benign prostatic hypertrophy)  Chronic obstructive pulmonary disease: COPD (chronic obstructive pulmonary disease)  Atherosclerosis of coronary artery: Coronary artery disease  Disease of pericardium: Pericardial effusion without cardiac tamponade  Constipation: Constipation  H/O heart artery stent  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 Successful PTCA KARAN pLAD &amp; PTCA ostial D1.        REVIEW OF SYSTEMS:    unable to obtain     ANTIBIOTICS:  MEDICATIONS  (STANDING):  bisacodyl 5 milliGRAM(s) Oral at bedtime  caspofungin IVPB 50 milliGRAM(s) IV Intermittent every 24 hours  chlorhexidine 0.12% Liquid 15 milliLiter(s) Oral Mucosa every 12 hours  chlorhexidine 2% Cloths 1 Application(s) Topical <User Schedule>  cisatracurium Infusion 2 MICROgram(s)/kG/Min (7.2 mL/Hr) IV Continuous <Continuous>  clopidogrel Tablet 75 milliGRAM(s) Oral daily  dextrose 5%. 1000 milliLiter(s) (50 mL/Hr) IV Continuous <Continuous>  dextrose 50% Injectable 12.5 Gram(s) IV Push once  dextrose 50% Injectable 25 Gram(s) IV Push once  dextrose 50% Injectable 25 Gram(s) IV Push once  enoxaparin Injectable 40 milliGRAM(s) SubCutaneous every 24 hours  famotidine Injectable 20 milliGRAM(s) IV Push every 12 hours  fentaNYL   Infusion. 0.5 MICROgram(s)/kG/Hr (3 mL/Hr) IV Continuous <Continuous>  insulin lispro (HumaLOG) corrective regimen sliding scale   SubCutaneous every 6 hours  petrolatum Ophthalmic Ointment 1 Application(s) Both EYES three times a day  propofol Infusion 5 MICROgram(s)/kG/Min (1.8 mL/Hr) IV Continuous <Continuous>  QUEtiapine 50 milliGRAM(s) Oral two times a day  senna 2 Tablet(s) Oral at bedtime    MEDICATIONS  (PRN):  acetaminophen    Suspension .. 650 milliGRAM(s) Oral every 6 hours PRN Temp greater or equal to 38C (100.4F)  ALBUTerol    90 MICROgram(s) HFA Inhaler 2 Puff(s) Inhalation every 4 hours PRN Shortness of Breath and/or Wheezing  dextrose 40% Gel 15 Gram(s) Oral once PRN Blood Glucose LESS THAN 70 milliGRAM(s)/deciliter  glucagon  Injectable 1 milliGRAM(s) IntraMuscular once PRN Glucose LESS THAN 70 milligrams/deciliter      Allergies    sulfa drugs (Unknown)  sulfADIAZINE (Rash)    Intolerances        SOCIAL HISTORY:    FAMILY HISTORY:  No pertinent family history in first degree relatives      Vital Signs Last 24 Hrs  T(C): 37.5 (14 Apr 2020 09:00), Max: 37.5 (14 Apr 2020 09:00)  T(F): 99.5 (14 Apr 2020 09:00), Max: 99.5 (14 Apr 2020 09:00)  HR: 93 (14 Apr 2020 09:21) (93 - 116)  BP: --  BP(mean): --  RR: 30 (14 Apr 2020 09:00) (30 - 30)  SpO2: 98% (14 Apr 2020 09:21) (93% - 100%)    PHYSICAL EXAM:  see primary team           LABS:                        7.5    14.29 )-----------( 274      ( 14 Apr 2020 03:43 )             23.2     04-14    145  |  114<H>  |  59<H>  ----------------------------<  154<H>  5.4<H>   |  22  |  1.56<H>    Ca    7.8<L>      14 Apr 2020 03:43  Phos  3.5     04-14  Mg     2.8     04-14    TPro  5.7<L>  /  Alb  2.2<L>  /  TBili  0.7  /  DBili  x   /  AST  51<H>  /  ALT  59<H>  /  AlkPhos  89  04-14          MICROBIOLOGY:    Culture - Blood (04.12.20 @ 15:14)    Gram Stain:   Aerobic Bottle: Yeast  Result called to and read back by_ Dev Wing Shine RN([id number]) 04/13/2020  21:52:20    Specimen Source: .Blood Blood    Culture Results:   Culture in progress    Culture - Blood (04.12.20 @ 13:01)    -  Candida albicans: Detec    Gram Stain:   Aerobic Bottle: Yeast  Result called to and read back by_ SUSANNAH Hein RN ([id number])  04/13/2020 20:22:21  ***Blood Panel PCR results on this specimen are available  approximately 3 hours after the Gram stain result.***  Gram stain, PCR, and/or culture results may not always  correspond due to difference in methodologies.  ************************************************************  This PCR assay was performed using [employer].  The following targets are tested for: Enterococcus,  vancomycin resistant enterococci, Listeria monocytogenes,  coagulase negative staphylococci, S. aureus,  methicillin resistant S. aureus, Streptococcus agalactiae  (Group B), S. pneumoniae, S. pyogenes (Group A),  Acinetobacter baumannii, Enterobacter cloacae, E.coli,  Klebsiella oxytoca, K. pneumoniae, Proteus sp.,  Serratia marcescens, Haemophilus influenzae,  Neisseria meningitidis, Pseudomonas aeruginosa, Candida  albicans, C. glabrata, C krusei, C parapsilosis,  C. tropicalis and the KPC resistance gene.  Anaerobic Bottle: Yeast    Specimen Source: .Blood Blood-Peripheral    Organism: Blood Culture PCR    Culture Results:   Culture in progress    Organism Identification: Blood Culture PCR    Method Type: PCR      RADIOLOGY & ADDITIONAL STUDIES:    < from: Xray Chest 1 View- PORTABLE-Routine (04.14.20 @ 02:49) >    An AP portable view of the chest reveals mild worsening of the bilateral interstitial and hazy pulmonary opacities. Tip of endotracheal tube 4.6 cm above the [place]. No pleural effusions. Tip of NG tube below the diaphragm but not included on this film. Left central venous catheter tip at the SVC/left brachycephalic vein junction.    IMPRESSION:  Mild worsening of bilateral infiltrates

## 2020-04-14 NOTE — ADVANCED PRACTICE NURSE CONSULT - ASSESSMENT
75M PMH of COPD (no history of exacerbations or intubations), CAD s/p multiple stents, hypertension who presents due to shortness of breath, cough, and fever for 1 week. Patient follows with Dr. Mera as an outpatient and completed 5 days of azithromycin and cefdinir, last dose 2 days ago and his shortness of breath worsened. COVID+, being proned daily. Eschar noted over bilat cheeks and bilat earlobes, skin tear to left upper shoulder. Foam dressings being applied over bilat knees and acromial processes.

## 2020-04-14 NOTE — PROCEDURE NOTE - NSSITEPREP_SKIN_A_CORE
Adherence to aseptic technique: hand hygiene prior to donning barriers (gown, gloves), don cap and mask, sterile drape over patient/Triple site prepped, air dried for three minutes, sterile technique maintained, vital signs remain stable throughout procedure./chlorhexidine

## 2020-04-14 NOTE — PROGRESS NOTE ADULT - SUBJECTIVE AND OBJECTIVE BOX
Patient Discussed on Morning Rounds with Dr. Soliz    Primary Diagnosis: COVID Pneumonia    SUBJECTIVE:  75yMale seen by intensivist at the bedside. OVN +yeast in blood cultures     Interval Events: +yeast in blood cultures     OBJECTIVE:  Vitals: ICU Vital Signs Last 24 Hrs  T(C): 37.5 (14 Apr 2020 09:00), Max: 37.5 (14 Apr 2020 09:00)  T(F): 99.5 (14 Apr 2020 09:00), Max: 99.5 (14 Apr 2020 09:00)  HR: 110 (14 Apr 2020 12:00) (93 - 110)  BP: --  BP(mean): --  ABP: 140/70 (14 Apr 2020 12:00) (105/57 - 140/70)  ABP(mean): 94 (14 Apr 2020 12:00) (77 - 94)  RR: 30 (14 Apr 2020 12:00) (30 - 30)  SpO2: 97% (14 Apr 2020 12:00) (93% - 100%)    I&O:  I&O's Detail    13 Apr 2020 07:01  -  14 Apr 2020 07:00  --------------------------------------------------------  IN:    Albumin 5%  - 250 mL: 500 mL    cisatracurium Infusion: 331.2 mL    fentaNYL Infusion.: 483 mL    IV PiggyBack: 510 mL    ns in tub fed  [id number]: 516 mL    propofol Infusion: 426.6 mL  Total IN: 2766.8 mL    OUT:    Indwelling Catheter - Urethral: 2325 mL  Total OUT: 2325 mL    Total NET: 441.8 mL      14 Apr 2020 07:01  -  14 Apr 2020 13:58  --------------------------------------------------------  IN:    cisatracurium Infusion: 86.4 mL    fentaNYL Infusion.: 126 mL    ns in tub fed  [id number]: 96 mL    propofol Infusion: 108 mL  Total IN: 416.4 mL    OUT:    Indwelling Catheter - Urethral: 625 mL  Total OUT: 625 mL    Total NET: -208.6 mL        VENTILATOR SETTINGS:  Mode: AC/ CMV (Assist Control/ Continuous Mandatory Ventilation)  RR (machine): 30  TV (machine): 400  FiO2: 60  PEEP: 10  ITime: 0.3  MAP: 28  PIP: 38    ABG - ( 14 Apr 2020 03:51 )  pH, Arterial: 7.33  pH, Blood: x     /  pCO2: 44    /  pO2: 87    / HCO3: 23    / Base Excess: -2.7  /  SaO2: 97                  PHYSICAL EXAM: Physical Exam performed by Intensivist to minimize risk of exposure.  Please refer to Attending Attestation for comprehensive exam.   General: No acute distress  Neuro: [Sedated]  CV: RRR  Pulm: [INTUBATED]  : [Villa]  Psych: affect appropriate    LABS:                         7.5    14.29 )-----------( 274      ( 14 Apr 2020 03:43 )             23.2   04-14    145  |  114<H>  |  59<H>  ----------------------------<  154<H>  5.4<H>   |  22  |  1.56<H>    Ca    7.8<L>      14 Apr 2020 03:43  Phos  3.5     04-14  Mg     2.8     04-14    TPro  5.7<L>  /  Alb  2.2<L>  /  TBili  0.7  /  DBili  x   /  AST  51<H>  /  ALT  59<H>  /  AlkPhos  89  04-14    Sedimentation Rate, Erythrocyte: >130 mm/Hr (04-14-20 @ 03:43)  Ferritin, Serum: 1896 ng/mL (04-14-20 @ 03:43)  D-Dimer Assay, Quantitative: 2223 ng/mL DDU (04-14-20 @ 03:43)  ABG - ( 14 Apr 2020 03:51 )  pH, Arterial: 7.33  pH, Blood: x     /  pCO2: 44    /  pO2: 87    / HCO3: 23    / Base Excess: -2.7  /  SaO2: 97                CAPILLARY BLOOD GLUCOSE      POCT Blood Glucose.: 149 mg/dL (14 Apr 2020 12:00)  POCT Blood Glucose.: 176 mg/dL (14 Apr 2020 06:19)  POCT Blood Glucose.: 168 mg/dL (14 Apr 2020 00:51)  POCT Blood Glucose.: 175 mg/dL (13 Apr 2020 17:37)     MEDICATIONS  (STANDING):  bisacodyl 5 milliGRAM(s) Oral at bedtime  caspofungin IVPB 50 milliGRAM(s) IV Intermittent every 24 hours  chlorhexidine 0.12% Liquid 15 milliLiter(s) Oral Mucosa every 12 hours  chlorhexidine 2% Cloths 1 Application(s) Topical <User Schedule>  cisatracurium Infusion 2 MICROgram(s)/kG/Min (7.2 mL/Hr) IV Continuous <Continuous>  clopidogrel Tablet 75 milliGRAM(s) Oral daily  dextrose 5%. 1000 milliLiter(s) (50 mL/Hr) IV Continuous <Continuous>  dextrose 50% Injectable 12.5 Gram(s) IV Push once  dextrose 50% Injectable 25 Gram(s) IV Push once  dextrose 50% Injectable 25 Gram(s) IV Push once  enoxaparin Injectable 40 milliGRAM(s) SubCutaneous every 24 hours  famotidine Injectable 20 milliGRAM(s) IV Push every 12 hours  fentaNYL   Infusion. 0.5 MICROgram(s)/kG/Hr (3 mL/Hr) IV Continuous <Continuous>  insulin lispro (HumaLOG) corrective regimen sliding scale   SubCutaneous every 6 hours  petrolatum Ophthalmic Ointment 1 Application(s) Both EYES three times a day  propofol Infusion 5 MICROgram(s)/kG/Min (1.8 mL/Hr) IV Continuous <Continuous>  QUEtiapine 50 milliGRAM(s) Oral two times a day  senna 2 Tablet(s) Oral at bedtime    MEDICATIONS  (PRN):  acetaminophen    Suspension .. 650 milliGRAM(s) Oral every 6 hours PRN Temp greater or equal to 38C (100.4F)  ALBUTerol    90 MICROgram(s) HFA Inhaler 2 Puff(s) Inhalation every 4 hours PRN Shortness of Breath and/or Wheezing  dextrose 40% Gel 15 Gram(s) Oral once PRN Blood Glucose LESS THAN 70 milliGRAM(s)/deciliter  glucagon  Injectable 1 milliGRAM(s) IntraMuscular once PRN Glucose LESS THAN 70 milligrams/deciliter    Assessment: 74 y/o M, PMHx  COPD (no hx exacerbations or intubations), CAD s/p multiple stents, hypertension who presented to [facility] ED on 4/2 due to SOB, cough, and fever for 1 week. Of note he had followed with Dr. Mera as an outpatient and completed a 5 day course of azithromycin and cefdinir prior to presentation. He was admitted for hypoxemic respiratory failure and respiratory distress and tested positive for COVID. The patient was intubated on 4/3 (current vent day 7). He completed course of plaquenil and azithromycin on 4/7. During his admission he was on vanc and zosyn for presumed HAP which was discontinued but restarted on 4/8 for empiric coverage fever and leukocytosis. Cx from 4/12 +Yeast, started on caspofungin and lines were changed.    NEURO: continue to monitor Neuro Status as per ICU protocol  - Sedation/Paralytics: Seroquel PO, Nimbex gtt, propofol gtt,   - Pain Control: PRN tylenol and fentanyl gtt     RESPIRATORY: Acute hypoxic respiratory failure d/t +COVID-19     - Continue to trend daily CXR        - Ventilator Settings: CMV 60%/400/30/100  - Continue ventilator weaning as tolerated   -continue nebulizer     CARDIOVASCULAR: Continue To monitor BP/HR/Tele   -Echo (TTE) ordered 2/2 +Yeast   - Tele Rhythm:  NSR  - continue Daily EKGs, Current QTc: 456   - Gtts: NA    GI:   - ppx: pepcid   - TFs/Nutrition: Jevity 24mL/hr   - Bowel Regimen:       /RENAL: Continue to monitor I/Os and CMP  - Fluid Balance   - Diuresis:  - BUN/Cr  - Lytes repleted if K <4 or Mg<2    HEME  - H/H: 7.5/23.2  -A/C: NA-     PV:  DVT Prophylaxis      - Lovenox / SC Heparin      - +/- SCDs   Lines:     ID: COVID-19     - Date of intubation:      - Date of extubation:      - Current O2 therapy:     - Proning: Yes/No          Date of last proning:      - Temperature:      - Azithromycin start date:                      End date:     - Plaquenil start date:                             End date:      - Tociluzamab:  Yes/No                           Date:      - Steroids:     - Sputum Culture :     - Abx:     - Blood Culture:      - Abx     ENDO:  - Glucose Regimen:   - Triglyceride level     DISPOSITION:   - Code Status:   - Continue current ICU care Patient Discussed on Morning Rounds with Dr. Soliz    Primary Diagnosis: COVID Pneumonia    SUBJECTIVE:  75yMale seen by intensivist at the bedside. OVN +yeast in blood cultures     Interval Events: +yeast in blood cultures     OBJECTIVE:  Vitals: ICU Vital Signs Last 24 Hrs  T(C): 37.5 (2020 09:00), Max: 37.5 (2020 09:00)  T(F): 99.5 (2020 09:00), Max: 99.5 (2020 09:00)  HR: 110 (2020 12:00) (93 - 110)  BP: --  BP(mean): --  ABP: 140/70 (:) (105/57 - 140/70)  ABP(mean): 94 (:) (77 - 94)  RR: 30 (2020 12:) (30 - 30)  SpO2: 97% (2020 12:00) (93% - 100%)    I&O:  I&O's Detail    2020 07:01  -  2020 07:00  --------------------------------------------------------  IN:    Albumin 5%  - 250 mL: 500 mL    cisatracurium Infusion: 331.2 mL    fentaNYL Infusion.: 483 mL    IV PiggyBack: 510 mL    ns in tub fed  [id number]: 516 mL    propofol Infusion: 426.6 mL  Total IN: 2766.8 mL    OUT:    Indwelling Catheter - Urethral: 2325 mL  Total OUT: 2325 mL    Total NET: 441.8 mL      2020 07:01  -  2020 13:58  --------------------------------------------------------  IN:    cisatracurium Infusion: 86.4 mL    fentaNYL Infusion.: 126 mL    ns in tub fed  [id number]: 96 mL    propofol Infusion: 108 mL  Total IN: 416.4 mL    OUT:    Indwelling Catheter - Urethral: 625 mL  Total OUT: 625 mL    Total NET: -208.6 mL        VENTILATOR SETTINGS:  Mode: AC/ CMV (Assist Control/ Continuous Mandatory Ventilation)  RR (machine): 30  TV (machine): 400  FiO2: 60  PEEP: 10  ITime: 0.3  MAP: 28  PIP: 38    ABG - ( 2020 03:51 )  pH, Arterial: 7.33  pH, Blood: x     /  pCO2: 44    /  pO2: 87    / HCO3: 23    / Base Excess: -2.7  /  SaO2: 97                  PHYSICAL EXAM: Physical Exam performed by Intensivist to minimize risk of exposure.  Please refer to Attending Attestation for comprehensive exam.   General: No acute distress  Neuro: [Sedated]  CV: RRR  Pulm: [INTUBATED]  : [Villa]  Psych: affect appropriate    LABS:                         7.5    14.29 )-----------( 274      ( 2020 03:43 )             23.2   04-14    145  |  114<H>  |  59<H>  ----------------------------<  154<H>  5.4<H>   |  22  |  1.56<H>    Ca    7.8<L>      2020 03:43  Phos  3.5     -  Mg     2.8     -14    TPro  5.7<L>  /  Alb  2.2<L>  /  TBili  0.7  /  DBili  x   /  AST  51<H>  /  ALT  59<H>  /  AlkPhos  89  04-14    Sedimentation Rate, Erythrocyte: >130 mm/Hr (20 @ 03:43)  Ferritin, Serum: 1896 ng/mL (20 @ 03:43)  D-Dimer Assay, Quantitative: 2223 ng/mL DDU (20 @ 03:43)  ABG - ( 2020 03:51 )  pH, Arterial: 7.33  pH, Blood: x     /  pCO2: 44    /  pO2: 87    / HCO3: 23    / Base Excess: -2.7  /  SaO2: 97                CAPILLARY BLOOD GLUCOSE      POCT Blood Glucose.: 149 mg/dL (2020 12:00)  POCT Blood Glucose.: 176 mg/dL (2020 06:19)  POCT Blood Glucose.: 168 mg/dL (2020 00:51)  POCT Blood Glucose.: 175 mg/dL (2020 17:37)     MEDICATIONS  (STANDING):  bisacodyl 5 milliGRAM(s) Oral at bedtime  caspofungin IVPB 50 milliGRAM(s) IV Intermittent every 24 hours  chlorhexidine 0.12% Liquid 15 milliLiter(s) Oral Mucosa every 12 hours  chlorhexidine 2% Cloths 1 Application(s) Topical <User Schedule>  cisatracurium Infusion 2 MICROgram(s)/kG/Min (7.2 mL/Hr) IV Continuous <Continuous>  clopidogrel Tablet 75 milliGRAM(s) Oral daily  dextrose 5%. 1000 milliLiter(s) (50 mL/Hr) IV Continuous <Continuous>  dextrose 50% Injectable 12.5 Gram(s) IV Push once  dextrose 50% Injectable 25 Gram(s) IV Push once  dextrose 50% Injectable 25 Gram(s) IV Push once  enoxaparin Injectable 40 milliGRAM(s) SubCutaneous every 24 hours  famotidine Injectable 20 milliGRAM(s) IV Push every 12 hours  fentaNYL   Infusion. 0.5 MICROgram(s)/kG/Hr (3 mL/Hr) IV Continuous <Continuous>  insulin lispro (HumaLOG) corrective regimen sliding scale   SubCutaneous every 6 hours  petrolatum Ophthalmic Ointment 1 Application(s) Both EYES three times a day  propofol Infusion 5 MICROgram(s)/kG/Min (1.8 mL/Hr) IV Continuous <Continuous>  QUEtiapine 50 milliGRAM(s) Oral two times a day  senna 2 Tablet(s) Oral at bedtime    MEDICATIONS  (PRN):  acetaminophen    Suspension .. 650 milliGRAM(s) Oral every 6 hours PRN Temp greater or equal to 38C (100.4F)  ALBUTerol    90 MICROgram(s) HFA Inhaler 2 Puff(s) Inhalation every 4 hours PRN Shortness of Breath and/or Wheezing  dextrose 40% Gel 15 Gram(s) Oral once PRN Blood Glucose LESS THAN 70 milliGRAM(s)/deciliter  glucagon  Injectable 1 milliGRAM(s) IntraMuscular once PRN Glucose LESS THAN 70 milligrams/deciliter    Assessment: 74 y/o M, PMHx  COPD (no hx exacerbations or intubations), CAD s/p multiple stents, hypertension who presented to [facility] ED on  due to SOB, cough, and fever for 1 week. Of note he had followed with Dr. Mera as an outpatient and completed a 5 day course of azithromycin and cefdinir prior to presentation. He was admitted for hypoxemic respiratory failure and respiratory distress and tested positive for COVID. The patient was intubated on 4/3 (current vent day 7). He completed course of plaquenil and azithromycin on . During his admission he was on vanc and zosyn for presumed HAP which was discontinued but restarted on  for empiric coverage fever and leukocytosis. Cx from  +Yeast, started on caspofungin and lines were changed.    NEURO: continue to monitor Neuro Status as per ICU protocol  - Sedation/Paralytics: Seroquel PO, Nimbex gtt, propofol gtt,   - Pain Control: PRN tylenol and fentanyl gtt     HEENT: will need optho consult in 1 week     RESPIRATORY: Acute hypoxic respiratory failure d/t +COVID-19     - Continue to trend daily CXR        - Ventilator Settings: CMV 60%/400/30/100  - Continue ventilator weaning as tolerated   -continue nebulizer     CARDIOVASCULAR: Continue To monitor BP/HR/Tele   -Echo (TTE) ordered  +Yeast   - Tele Rhythm:  NSR  - continue Daily EKGs, Current QTc: 456   - Gtts: NA    GI:   - ppx: pepcid   - TFs/Nutrition: Jevity 24mL/hr       /RENAL: Continue to monitor I/Os and CMP improving JUANITA continue to trend   - Fluid Balance +22  - BUN/Cr: 59/1.56  - Lytes repleted if K <4 or Mg<2    HEME  - H/H: 7.5/23.2  -A/C: NA     PV:  DVT Prophylaxis      - Lovenox as per hospital protocol for ICU pt     Lines:  [place] and TLC    ID: COVID-19     - Date of intubation: 4/3     - Date of extubation: NA     - Current O2 therapy: CMV     - Proning: Yes          Date of last pronin/14     - Temperature: afebrile     - Azithromycin start date:            - Plaquenil start date:               - Tociluzamab:  No          - Steroids: yes    - Sputum Culture : f/u this AM     - Blood Culture:  +yeast, f/u sensitivies      - Caspofungin     ENDO: RISS  - Glucose Regimen: RISS  - Triglyceride level: 488, f/u tomorrow AM     DISPOSITION:   - Code Status: FULL CODE   - Continue current ICU care

## 2020-04-14 NOTE — PROCEDURE NOTE - NSICDXPROCEDURE_GEN_ALL_CORE_FT
PROCEDURES:  Percutaneous arterial cannulation for monitoring 14-Apr-2020 17:16:15  Vaughn Mccullough S
PROCEDURES:  Central venous catheter placement with ultrasound guidance 14-Apr-2020 13:30:34  Vaughn Mccullough S

## 2020-04-14 NOTE — PROGRESS NOTE ADULT - SUBJECTIVE AND OBJECTIVE BOX
Left Axillary [place] discontinued, pressure held for 10 minutes, tip intact, no bleeding noted, no hematoma noted, pt VSS remain stable, Dressing applied. Left Axillary [place] discontinued, pressure held for 10 minutes, tip intact, no bleeding noted, no hematoma noted, pt Vital signs remain stable, Dressing applied.

## 2020-04-15 VITALS — OXYGEN SATURATION: 91 % | RESPIRATION RATE: 32 BRPM

## 2020-04-15 LAB
-  AMIKACIN: SIGNIFICANT CHANGE UP
-  CEFAZOLIN: SIGNIFICANT CHANGE UP
-  CLINDAMYCIN: SIGNIFICANT CHANGE UP
-  ERYTHROMYCIN: SIGNIFICANT CHANGE UP
-  K. PNEUMONIAE GROUP: SIGNIFICANT CHANGE UP
-  LINEZOLID: SIGNIFICANT CHANGE UP
-  PENICILLIN: SIGNIFICANT CHANGE UP
-  TRIMETHOPRIM/SULFAMETHOXAZOLE: SIGNIFICANT CHANGE UP
-  VANCOMYCIN: SIGNIFICANT CHANGE UP
-  VANCOMYCIN: SIGNIFICANT CHANGE UP
ALBUMIN SERPL ELPH-MCNC: 2.4 G/DL — LOW (ref 3.3–5)
ALP SERPL-CCNC: 116 U/L — SIGNIFICANT CHANGE UP (ref 40–120)
ALT FLD-CCNC: 60 U/L — HIGH (ref 10–45)
ANION GAP SERPL CALC-SCNC: 10 MMOL/L — SIGNIFICANT CHANGE UP (ref 5–17)
APPEARANCE UR: CLEAR — SIGNIFICANT CHANGE UP
AST SERPL-CCNC: 50 U/L — HIGH (ref 10–40)
BASE EXCESS BLDA CALC-SCNC: -0.7 MMOL/L — SIGNIFICANT CHANGE UP (ref -2–3)
BASE EXCESS BLDA CALC-SCNC: -0.9 MMOL/L — SIGNIFICANT CHANGE UP (ref -2–3)
BASE EXCESS BLDA CALC-SCNC: -1 MMOL/L — SIGNIFICANT CHANGE UP (ref -2–3)
BASE EXCESS BLDA CALC-SCNC: -15.1 MMOL/L — LOW (ref -2–3)
BASE EXCESS BLDA CALC-SCNC: -2.9 MMOL/L — LOW (ref -2–3)
BASE EXCESS BLDA CALC-SCNC: -7.7 MMOL/L — LOW (ref -2–3)
BASOPHILS # BLD AUTO: 0 K/UL — SIGNIFICANT CHANGE UP (ref 0–0.2)
BASOPHILS NFR BLD AUTO: 0 % — SIGNIFICANT CHANGE UP (ref 0–2)
BILIRUB SERPL-MCNC: 1.8 MG/DL — HIGH (ref 0.2–1.2)
BILIRUB UR-MCNC: NEGATIVE — SIGNIFICANT CHANGE UP
BUN SERPL-MCNC: 53 MG/DL — HIGH (ref 7–23)
CALCIUM SERPL-MCNC: 8.6 MG/DL — SIGNIFICANT CHANGE UP (ref 8.4–10.5)
CHLORIDE SERPL-SCNC: 112 MMOL/L — HIGH (ref 96–108)
CO2 SERPL-SCNC: 24 MMOL/L — SIGNIFICANT CHANGE UP (ref 22–31)
COLOR SPEC: YELLOW — SIGNIFICANT CHANGE UP
CREAT SERPL-MCNC: 1.46 MG/DL — HIGH (ref 0.5–1.3)
CRP SERPL-MCNC: 22.38 MG/DL — HIGH (ref 0–0.4)
CULTURE RESULTS: SIGNIFICANT CHANGE UP
CULTURE RESULTS: SIGNIFICANT CHANGE UP
DIFF PNL FLD: ABNORMAL
EOSINOPHIL # BLD AUTO: 0.25 K/UL — SIGNIFICANT CHANGE UP (ref 0–0.5)
EOSINOPHIL NFR BLD AUTO: 1.9 % — SIGNIFICANT CHANGE UP (ref 0–6)
FERRITIN SERPL-MCNC: 1918 NG/ML — HIGH (ref 30–400)
GAS PNL BLDA: SIGNIFICANT CHANGE UP
GAS PNL BLDA: SIGNIFICANT CHANGE UP
GLUCOSE SERPL-MCNC: 113 MG/DL — HIGH (ref 70–99)
GLUCOSE UR QL: NEGATIVE — SIGNIFICANT CHANGE UP
GRAM STN FLD: SIGNIFICANT CHANGE UP
GRAM STN FLD: SIGNIFICANT CHANGE UP
HCO3 BLDA-SCNC: 18 MMOL/L — LOW (ref 21–28)
HCO3 BLDA-SCNC: 25 MMOL/L — SIGNIFICANT CHANGE UP (ref 21–28)
HCO3 BLDA-SCNC: 26 MMOL/L — SIGNIFICANT CHANGE UP (ref 21–28)
HCO3 BLDA-SCNC: 28 MMOL/L — SIGNIFICANT CHANGE UP (ref 21–28)
HCT VFR BLD CALC: 30.4 % — LOW (ref 39–50)
HGB BLD-MCNC: 9.6 G/DL — LOW (ref 13–17)
KETONES UR-MCNC: NEGATIVE — SIGNIFICANT CHANGE UP
LEUKOCYTE ESTERASE UR-ACNC: ABNORMAL
LYMPHOCYTES # BLD AUTO: 0.37 K/UL — LOW (ref 1–3.3)
LYMPHOCYTES # BLD AUTO: 2.8 % — LOW (ref 13–44)
MAGNESIUM SERPL-MCNC: 2.5 MG/DL — SIGNIFICANT CHANGE UP (ref 1.6–2.6)
MCHC RBC-ENTMCNC: 28.8 PG — SIGNIFICANT CHANGE UP (ref 27–34)
MCHC RBC-ENTMCNC: 31.6 GM/DL — LOW (ref 32–36)
MCV RBC AUTO: 91.3 FL — SIGNIFICANT CHANGE UP (ref 80–100)
METHOD TYPE: SIGNIFICANT CHANGE UP
METHOD TYPE: SIGNIFICANT CHANGE UP
MONOCYTES # BLD AUTO: 0.12 K/UL — SIGNIFICANT CHANGE UP (ref 0–0.9)
MONOCYTES NFR BLD AUTO: 0.9 % — LOW (ref 2–14)
NEUTROPHILS # BLD AUTO: 12.26 K/UL — HIGH (ref 1.8–7.4)
NEUTROPHILS NFR BLD AUTO: 90.8 % — HIGH (ref 43–77)
NITRITE UR-MCNC: NEGATIVE — SIGNIFICANT CHANGE UP
NRBC # BLD: SIGNIFICANT CHANGE UP /100 WBCS (ref 0–0)
ORGANISM # SPEC MICROSCOPIC CNT: SIGNIFICANT CHANGE UP
PCO2 BLDA: 128 MMHG — CRITICAL HIGH (ref 35–48)
PCO2 BLDA: 48 MMHG — SIGNIFICANT CHANGE UP (ref 35–48)
PCO2 BLDA: 51 MMHG — HIGH (ref 35–48)
PCO2 BLDA: 54 MMHG — HIGH (ref 35–48)
PCO2 BLDA: 55 MMHG — HIGH (ref 35–48)
PCO2 BLDA: 90 MMHG — CRITICAL HIGH (ref 35–48)
PH BLDA: 6.92 — CRITICAL LOW (ref 7.35–7.45)
PH BLDA: 6.97 — CRITICAL LOW (ref 7.35–7.45)
PH BLDA: 7.27 — LOW (ref 7.35–7.45)
PH BLDA: 7.3 — LOW (ref 7.35–7.45)
PH BLDA: 7.32 — LOW (ref 7.35–7.45)
PH BLDA: 7.33 — LOW (ref 7.35–7.45)
PH UR: 6 — SIGNIFICANT CHANGE UP (ref 5–8)
PHOSPHATE SERPL-MCNC: 4 MG/DL — SIGNIFICANT CHANGE UP (ref 2.5–4.5)
PLATELET # BLD AUTO: 286 K/UL — SIGNIFICANT CHANGE UP (ref 150–400)
PO2 BLDA: 31 MMHG — CRITICAL LOW (ref 83–108)
PO2 BLDA: 32 MMHG — CRITICAL LOW (ref 83–108)
PO2 BLDA: 47 MMHG — CRITICAL LOW (ref 83–108)
PO2 BLDA: 55 MMHG — CRITICAL LOW (ref 83–108)
PO2 BLDA: 88 MMHG — SIGNIFICANT CHANGE UP (ref 83–108)
PO2 BLDA: 90 MMHG — SIGNIFICANT CHANGE UP (ref 83–108)
POTASSIUM SERPL-MCNC: 5.2 MMOL/L — SIGNIFICANT CHANGE UP (ref 3.5–5.3)
POTASSIUM SERPL-SCNC: 5.2 MMOL/L — SIGNIFICANT CHANGE UP (ref 3.5–5.3)
PROT SERPL-MCNC: 6 G/DL — SIGNIFICANT CHANGE UP (ref 6–8.3)
PROT UR-MCNC: NEGATIVE MG/DL — SIGNIFICANT CHANGE UP
RBC # BLD: 3.33 M/UL — LOW (ref 4.2–5.8)
RBC # FLD: 16.7 % — HIGH (ref 10.3–14.5)
SAO2 % BLDA: 39 % — LOW (ref 95–100)
SAO2 % BLDA: 79 % — LOW (ref 95–100)
SAO2 % BLDA: 88 % — LOW (ref 95–100)
SAO2 % BLDA: 96 % — SIGNIFICANT CHANGE UP (ref 95–100)
SAO2 % BLDA: 97 % — SIGNIFICANT CHANGE UP (ref 95–100)
SODIUM SERPL-SCNC: 146 MMOL/L — HIGH (ref 135–145)
SP GR SPEC: 1.02 — SIGNIFICANT CHANGE UP (ref 1–1.03)
SPECIMEN SOURCE: SIGNIFICANT CHANGE UP
SPECIMEN SOURCE: SIGNIFICANT CHANGE UP
UROBILINOGEN FLD QL: 0.2 E.U./DL — SIGNIFICANT CHANGE UP
VANCOMYCIN FLD-MCNC: 14.4 UG/ML — SIGNIFICANT CHANGE UP
WBC # BLD: 13.24 K/UL — HIGH (ref 3.8–10.5)
WBC # FLD AUTO: 13.24 K/UL — HIGH (ref 3.8–10.5)

## 2020-04-15 PROCEDURE — 71045 X-RAY EXAM CHEST 1 VIEW: CPT | Mod: 26

## 2020-04-15 PROCEDURE — 99285 EMERGENCY DEPT VISIT HI MDM: CPT | Mod: 25

## 2020-04-15 PROCEDURE — 83605 ASSAY OF LACTIC ACID: CPT

## 2020-04-15 PROCEDURE — 85027 COMPLETE CBC AUTOMATED: CPT

## 2020-04-15 PROCEDURE — 94003 VENT MGMT INPAT SUBQ DAY: CPT

## 2020-04-15 PROCEDURE — 83930 ASSAY OF BLOOD OSMOLALITY: CPT

## 2020-04-15 PROCEDURE — 87581 M.PNEUMON DNA AMP PROBE: CPT

## 2020-04-15 PROCEDURE — 74019 RADEX ABDOMEN 2 VIEWS: CPT

## 2020-04-15 PROCEDURE — 87486 CHLMYD PNEUM DNA AMP PROBE: CPT

## 2020-04-15 PROCEDURE — 82962 GLUCOSE BLOOD TEST: CPT

## 2020-04-15 PROCEDURE — P9045: CPT

## 2020-04-15 PROCEDURE — 83880 ASSAY OF NATRIURETIC PEPTIDE: CPT

## 2020-04-15 PROCEDURE — 86850 RBC ANTIBODY SCREEN: CPT

## 2020-04-15 PROCEDURE — 82550 ASSAY OF CK (CPK): CPT

## 2020-04-15 PROCEDURE — 84134 ASSAY OF PREALBUMIN: CPT

## 2020-04-15 PROCEDURE — 85379 FIBRIN DEGRADATION QUANT: CPT

## 2020-04-15 PROCEDURE — 83735 ASSAY OF MAGNESIUM: CPT

## 2020-04-15 PROCEDURE — 84132 ASSAY OF SERUM POTASSIUM: CPT

## 2020-04-15 PROCEDURE — 81001 URINALYSIS AUTO W/SCOPE: CPT

## 2020-04-15 PROCEDURE — 87150 DNA/RNA AMPLIFIED PROBE: CPT

## 2020-04-15 PROCEDURE — 86923 COMPATIBILITY TEST ELECTRIC: CPT

## 2020-04-15 PROCEDURE — 82803 BLOOD GASES ANY COMBINATION: CPT

## 2020-04-15 PROCEDURE — 94002 VENT MGMT INPAT INIT DAY: CPT

## 2020-04-15 PROCEDURE — 86359 T CELLS TOTAL COUNT: CPT

## 2020-04-15 PROCEDURE — 87633 RESP VIRUS 12-25 TARGETS: CPT

## 2020-04-15 PROCEDURE — 85384 FIBRINOGEN ACTIVITY: CPT

## 2020-04-15 PROCEDURE — 84295 ASSAY OF SERUM SODIUM: CPT

## 2020-04-15 PROCEDURE — 99232 SBSQ HOSP IP/OBS MODERATE 35: CPT | Mod: CS

## 2020-04-15 PROCEDURE — 71045 X-RAY EXAM CHEST 1 VIEW: CPT

## 2020-04-15 PROCEDURE — 80202 ASSAY OF VANCOMYCIN: CPT

## 2020-04-15 PROCEDURE — 82553 CREATINE MB FRACTION: CPT

## 2020-04-15 PROCEDURE — 96375 TX/PRO/DX INJ NEW DRUG ADDON: CPT

## 2020-04-15 PROCEDURE — 87181 SC STD AGAR DILUTION PER AGT: CPT

## 2020-04-15 PROCEDURE — 36415 COLL VENOUS BLD VENIPUNCTURE: CPT

## 2020-04-15 PROCEDURE — 71045 X-RAY EXAM CHEST 1 VIEW: CPT | Mod: 26,77

## 2020-04-15 PROCEDURE — 93970 EXTREMITY STUDY: CPT

## 2020-04-15 PROCEDURE — 80048 BASIC METABOLIC PNL TOTAL CA: CPT

## 2020-04-15 PROCEDURE — 87070 CULTURE OTHR SPECIMN AEROBIC: CPT

## 2020-04-15 PROCEDURE — 87086 URINE CULTURE/COLONY COUNT: CPT

## 2020-04-15 PROCEDURE — 87798 DETECT AGENT NOS DNA AMP: CPT

## 2020-04-15 PROCEDURE — 84145 PROCALCITONIN (PCT): CPT

## 2020-04-15 PROCEDURE — 82955 ASSAY OF G6PD ENZYME: CPT

## 2020-04-15 PROCEDURE — 82330 ASSAY OF CALCIUM: CPT

## 2020-04-15 PROCEDURE — 87635 SARS-COV-2 COVID-19 AMP PRB: CPT

## 2020-04-15 PROCEDURE — 99291 CRITICAL CARE FIRST HOUR: CPT | Mod: CS,25

## 2020-04-15 PROCEDURE — P9016: CPT

## 2020-04-15 PROCEDURE — 84100 ASSAY OF PHOSPHORUS: CPT

## 2020-04-15 PROCEDURE — 83935 ASSAY OF URINE OSMOLALITY: CPT

## 2020-04-15 PROCEDURE — 84540 ASSAY OF URINE/UREA-N: CPT

## 2020-04-15 PROCEDURE — 84484 ASSAY OF TROPONIN QUANT: CPT

## 2020-04-15 PROCEDURE — 86803 HEPATITIS C AB TEST: CPT

## 2020-04-15 PROCEDURE — 93005 ELECTROCARDIOGRAM TRACING: CPT

## 2020-04-15 PROCEDURE — 86140 C-REACTIVE PROTEIN: CPT

## 2020-04-15 PROCEDURE — 83615 LACTATE (LD) (LDH) ENZYME: CPT

## 2020-04-15 PROCEDURE — 87040 BLOOD CULTURE FOR BACTERIA: CPT

## 2020-04-15 PROCEDURE — 85730 THROMBOPLASTIN TIME PARTIAL: CPT

## 2020-04-15 PROCEDURE — 82570 ASSAY OF URINE CREATININE: CPT

## 2020-04-15 PROCEDURE — 85025 COMPLETE CBC W/AUTO DIFF WBC: CPT

## 2020-04-15 PROCEDURE — 87449 NOS EACH ORGANISM AG IA: CPT

## 2020-04-15 PROCEDURE — 85652 RBC SED RATE AUTOMATED: CPT

## 2020-04-15 PROCEDURE — 87186 SC STD MICRODIL/AGAR DIL: CPT

## 2020-04-15 PROCEDURE — 84300 ASSAY OF URINE SODIUM: CPT

## 2020-04-15 PROCEDURE — 84478 ASSAY OF TRIGLYCERIDES: CPT

## 2020-04-15 PROCEDURE — 94660 CPAP INITIATION&MGMT: CPT

## 2020-04-15 PROCEDURE — 80053 COMPREHEN METABOLIC PANEL: CPT

## 2020-04-15 PROCEDURE — 36430 TRANSFUSION BLD/BLD COMPNT: CPT

## 2020-04-15 PROCEDURE — 82728 ASSAY OF FERRITIN: CPT

## 2020-04-15 PROCEDURE — 96374 THER/PROPH/DIAG INJ IV PUSH: CPT

## 2020-04-15 PROCEDURE — 87184 SC STD DISK METHOD PER PLATE: CPT

## 2020-04-15 PROCEDURE — 36620 INSERTION CATHETER ARTERY: CPT | Mod: CS

## 2020-04-15 PROCEDURE — 94640 AIRWAY INHALATION TREATMENT: CPT

## 2020-04-15 PROCEDURE — 86901 BLOOD TYPING SEROLOGIC RH(D): CPT

## 2020-04-15 PROCEDURE — 85610 PROTHROMBIN TIME: CPT

## 2020-04-15 RX ORDER — VANCOMYCIN HCL 1 G
1000 VIAL (EA) INTRAVENOUS EVERY 12 HOURS
Refills: 0 | Status: DISCONTINUED | OUTPATIENT
Start: 2020-04-15 | End: 2020-04-15

## 2020-04-15 RX ORDER — CEFTRIAXONE 500 MG/1
1000 INJECTION, POWDER, FOR SOLUTION INTRAMUSCULAR; INTRAVENOUS EVERY 24 HOURS
Refills: 0 | Status: DISCONTINUED | OUTPATIENT
Start: 2020-04-15 | End: 2020-04-15

## 2020-04-15 RX ORDER — VANCOMYCIN HCL 1 G
1000 VIAL (EA) INTRAVENOUS ONCE
Refills: 0 | Status: COMPLETED | OUTPATIENT
Start: 2020-04-15 | End: 2020-04-15

## 2020-04-15 RX ORDER — MAGNESIUM SULFATE 500 MG/ML
2 VIAL (ML) INJECTION ONCE
Refills: 0 | Status: COMPLETED | OUTPATIENT
Start: 2020-04-15 | End: 2020-04-15

## 2020-04-15 RX ORDER — INSULIN LISPRO 100/ML
VIAL (ML) SUBCUTANEOUS EVERY 6 HOURS
Refills: 0 | Status: DISCONTINUED | OUTPATIENT
Start: 2020-04-15 | End: 2020-04-15

## 2020-04-15 RX ORDER — ACETAMINOPHEN 500 MG
1000 TABLET ORAL ONCE
Refills: 0 | Status: DISCONTINUED | OUTPATIENT
Start: 2020-04-15 | End: 2020-04-15

## 2020-04-15 RX ORDER — ALBUTEROL 90 UG/1
2 AEROSOL, METERED ORAL EVERY 8 HOURS
Refills: 0 | Status: DISCONTINUED | OUTPATIENT
Start: 2020-04-15 | End: 2020-04-15

## 2020-04-15 RX ORDER — ACETYLCYSTEINE 200 MG/ML
10 VIAL (ML) MISCELLANEOUS THREE TIMES A DAY
Refills: 0 | Status: DISCONTINUED | OUTPATIENT
Start: 2020-04-15 | End: 2020-04-15

## 2020-04-15 RX ORDER — FUROSEMIDE 40 MG
20 TABLET ORAL ONCE
Refills: 0 | Status: COMPLETED | OUTPATIENT
Start: 2020-04-15 | End: 2020-04-15

## 2020-04-15 RX ORDER — CISATRACURIUM BESYLATE 2 MG/ML
10 INJECTION INTRAVENOUS ONCE
Refills: 0 | Status: COMPLETED | OUTPATIENT
Start: 2020-04-15 | End: 2020-04-15

## 2020-04-15 RX ORDER — METOPROLOL TARTRATE 50 MG
2.5 TABLET ORAL ONCE
Refills: 0 | Status: COMPLETED | OUTPATIENT
Start: 2020-04-15 | End: 2020-04-15

## 2020-04-15 RX ORDER — LABETALOL HCL 100 MG
10 TABLET ORAL ONCE
Refills: 0 | Status: DISCONTINUED | OUTPATIENT
Start: 2020-04-15 | End: 2020-04-15

## 2020-04-15 RX ADMIN — CHLORHEXIDINE GLUCONATE 15 MILLILITER(S): 213 SOLUTION TOPICAL at 05:50

## 2020-04-15 RX ADMIN — QUETIAPINE FUMARATE 50 MILLIGRAM(S): 200 TABLET, FILM COATED ORAL at 05:51

## 2020-04-15 RX ADMIN — PROPOFOL 1.8 MICROGRAM(S)/KG/MIN: 10 INJECTION, EMULSION INTRAVENOUS at 05:51

## 2020-04-15 RX ADMIN — CHLORHEXIDINE GLUCONATE 1 APPLICATION(S): 213 SOLUTION TOPICAL at 05:50

## 2020-04-15 RX ADMIN — Medication 250 MILLIGRAM(S): at 06:35

## 2020-04-15 RX ADMIN — CISATRACURIUM BESYLATE 7.2 MICROGRAM(S)/KG/MIN: 2 INJECTION INTRAVENOUS at 12:42

## 2020-04-15 RX ADMIN — Medication 1 APPLICATION(S): at 05:51

## 2020-04-15 RX ADMIN — FENTANYL CITRATE 3 MICROGRAM(S)/KG/HR: 50 INJECTION INTRAVENOUS at 12:46

## 2020-04-15 RX ADMIN — FAMOTIDINE 20 MILLIGRAM(S): 10 INJECTION INTRAVENOUS at 05:50

## 2020-04-15 RX ADMIN — PROPOFOL 1.8 MICROGRAM(S)/KG/MIN: 10 INJECTION, EMULSION INTRAVENOUS at 09:51

## 2020-04-15 RX ADMIN — CEFTRIAXONE 100 MILLIGRAM(S): 500 INJECTION, POWDER, FOR SOLUTION INTRAMUSCULAR; INTRAVENOUS at 18:03

## 2020-04-15 RX ADMIN — PROPOFOL 1.8 MICROGRAM(S)/KG/MIN: 10 INJECTION, EMULSION INTRAVENOUS at 00:31

## 2020-04-15 RX ADMIN — Medication 20 MILLIGRAM(S): at 09:53

## 2020-04-15 RX ADMIN — Medication 2.5 MILLIGRAM(S): at 18:06

## 2020-04-15 RX ADMIN — CHLORHEXIDINE GLUCONATE 15 MILLILITER(S): 213 SOLUTION TOPICAL at 18:03

## 2020-04-15 RX ADMIN — QUETIAPINE FUMARATE 50 MILLIGRAM(S): 200 TABLET, FILM COATED ORAL at 18:04

## 2020-04-15 RX ADMIN — CISATRACURIUM BESYLATE 7.2 MICROGRAM(S)/KG/MIN: 2 INJECTION INTRAVENOUS at 00:31

## 2020-04-15 RX ADMIN — Medication 2: at 14:25

## 2020-04-15 RX ADMIN — Medication 650 MILLIGRAM(S): at 12:44

## 2020-04-15 RX ADMIN — CLOPIDOGREL BISULFATE 75 MILLIGRAM(S): 75 TABLET, FILM COATED ORAL at 09:53

## 2020-04-15 RX ADMIN — ENOXAPARIN SODIUM 40 MILLIGRAM(S): 100 INJECTION SUBCUTANEOUS at 09:56

## 2020-04-15 RX ADMIN — Medication 50 GRAM(S): at 12:42

## 2020-04-15 RX ADMIN — Medication 1 APPLICATION(S): at 14:26

## 2020-04-15 RX ADMIN — Medication 4: at 19:09

## 2020-04-15 RX ADMIN — CISATRACURIUM BESYLATE 10 MILLIGRAM(S): 2 INJECTION INTRAVENOUS at 11:44

## 2020-04-15 RX ADMIN — Medication 2: at 07:15

## 2020-04-15 RX ADMIN — FAMOTIDINE 20 MILLIGRAM(S): 10 INJECTION INTRAVENOUS at 18:03

## 2020-04-15 RX ADMIN — CASPOFUNGIN ACETATE 260 MILLIGRAM(S): 7 INJECTION, POWDER, LYOPHILIZED, FOR SOLUTION INTRAVENOUS at 18:05

## 2020-04-15 NOTE — CHART NOTE - NSCHARTNOTEFT_GEN_A_CORE
Code Blue Event Note    Called to bedside by nursing at 8:50pm for tachycardia to 130s, SBP 220s, SPO2 80% on FiO2 90%. Increased FiO2 100%, febrile 101. MICU intensivist and anesthesiologist at bedside by 9pm. Discussed giving labetalol; however, nurse identified     breath sounds bilateral, ETT visualized through vocal cords with Glideoscope    9:09 compressions started  9:11 pulse check with sinus tachycardia    9:30 x-ray, peak pressures 70s Code Blue Event Note    Called to bedside by nursing at 8:50pm for tachycardia to 130s, SBP 220s, SPO2 80% on FiO2 90%. Increased FiO2 100%, febrile 101. MICU intensivist and anesthesiologist at bedside by 9pm. Discussed giving labetalol; however, ETT noticed to be dislodged. While setting up for reintubation, HR decreased to 50s, SBP 60s-70s, SPO2 decreased to 4% while bagging with 100%. Intubated, ETT visualized through vocal cords, end tidal CO2, breath sounds heard equally. 9:09 lost pulse, compressions started, 1mg epinephrine given. 9 pulse check with sinus tachycardia in 120s, given 1 amp bicarb. Sats improved to 30s-40s with bagging. POCUS without evidence of pneumothorax. 9:30 x-ray confirms no pneumothorax, peak pressures 70s, abdomen significantly distended. Family updated via phone, continuing to request full code. 9:56 lost pulse, started chest compressions, 1mg epinephrine given. 10:00 regained pulse, monitor with sinus bradycardia in 20s. 10:02 lost pulse, time of death called. Family updated.

## 2020-04-15 NOTE — PROGRESS NOTE ADULT - SUBJECTIVE AND OBJECTIVE BOX
INTERVAL HPI/OVERNIGHT EVENTS:    Patient was seen at bedside.  FiO2 increased to 90 %.  + fevers.  + thick secretions    ROS:    unable to obtain     ANTIBIOTICS/RELEVANT:    MEDICATIONS  (STANDING):  bisacodyl 5 milliGRAM(s) Oral at bedtime  caspofungin IVPB 50 milliGRAM(s) IV Intermittent every 24 hours  cefTRIAXone   IVPB 1000 milliGRAM(s) IV Intermittent every 24 hours  chlorhexidine 0.12% Liquid 15 milliLiter(s) Oral Mucosa every 12 hours  chlorhexidine 2% Cloths 1 Application(s) Topical <User Schedule>  cisatracurium Infusion 2 MICROgram(s)/kG/Min (7.2 mL/Hr) IV Continuous <Continuous>  clopidogrel Tablet 75 milliGRAM(s) Oral daily  dextrose 5%. 1000 milliLiter(s) (50 mL/Hr) IV Continuous <Continuous>  dextrose 50% Injectable 12.5 Gram(s) IV Push once  dextrose 50% Injectable 25 Gram(s) IV Push once  dextrose 50% Injectable 25 Gram(s) IV Push once  enoxaparin Injectable 40 milliGRAM(s) SubCutaneous every 24 hours  famotidine Injectable 20 milliGRAM(s) IV Push every 12 hours  fentaNYL   Infusion. 0.5 MICROgram(s)/kG/Hr (3 mL/Hr) IV Continuous <Continuous>  insulin lispro (HumaLOG) corrective regimen sliding scale   SubCutaneous every 6 hours  metoprolol tartrate Injectable 2.5 milliGRAM(s) IV Push once  petrolatum Ophthalmic Ointment 1 Application(s) Both EYES three times a day  propofol Infusion 5 MICROgram(s)/kG/Min (1.8 mL/Hr) IV Continuous <Continuous>  QUEtiapine 50 milliGRAM(s) Oral two times a day  senna 2 Tablet(s) Oral at bedtime  vasopressin Infusion 0.017 Unit(s)/Min (1 mL/Hr) IV Continuous <Continuous>    MEDICATIONS  (PRN):  acetaminophen    Suspension .. 650 milliGRAM(s) Oral every 6 hours PRN Temp greater or equal to 38C (100.4F)  ALBUTerol    90 MICROgram(s) HFA Inhaler 2 Puff(s) Inhalation every 4 hours PRN Shortness of Breath and/or Wheezing  dextrose 40% Gel 15 Gram(s) Oral once PRN Blood Glucose LESS THAN 70 milliGRAM(s)/deciliter  glucagon  Injectable 1 milliGRAM(s) IntraMuscular once PRN Glucose LESS THAN 70 milligrams/deciliter        Vital Signs Last 24 Hrs  T(C): 37.5 (15 Apr 2020 14:00), Max: 38.4 (2020 18:00)  T(F): 99.5 (15 Apr 2020 14:00), Max: 101.2 (2020 18:00)  HR: 124 (15 Apr 2020 12:00) (101 - 124)  BP: 176/90 (15 Apr 2020 10:00) (103/60 - 176/90)  BP(mean): 124 (15 Apr 2020 10:00) (77 - 124)  RR: 32 (15 Apr 2020 12:00) (30 - 32)  SpO2: 97% (15 Apr 2020 12:00) (90% - 100%)    PHYSICAL EXAM:  see primary team note     LABS:                        9.6    13.24 )-----------( 286      ( 15 Apr 2020 04:11 )             30.4     04-15    146<H>  |  112<H>  |  53<H>  ----------------------------<  113<H>  5.2   |  24  |  1.46<H>    Ca    8.6      15 Apr 2020 04:11  Phos  4.0     04-15  Mg     2.5     -15    TPro  6.0  /  Alb  2.4<L>  /  TBili  1.8<H>  /  DBili  x   /  AST  50<H>  /  ALT  60<H>  /  AlkPhos  116  04-15      Urinalysis Basic - ( 15 Apr 2020 13:48 )    Color: Yellow / Appearance: Clear / S.020 / pH: x  Gluc: x / Ketone: NEGATIVE  / Bili: Negative / Urobili: 0.2 E.U./dL   Blood: x / Protein: NEGATIVE mg/dL / Nitrite: NEGATIVE   Leuk Esterase: Trace / RBC: See Note /HPF / WBC > 10 /HPF   Sq Epi: x / Non Sq Epi: x / Bacteria: Present /HPF        MICROBIOLOGY:    Culture - Sputum . (20 @ 18:09)    Gram Stain:   No epithelial cells seen  Few-moderate White blood cells  Few-moderate Gram Negative Rods    Specimen Source: .Sputum Sputum    Culture Results:   Moderate Klebsiella pneumoniae Susceptibility to follow.  Moderate Escherichia coli Susceptibility to follow.  No routine respiratory maximino Isolated      Culture - Urine (20 @ 14:57)    Specimen Source: .Urine Catheterized    Culture Results:   6,000 CFU/ml Candida albicans    Culture - Catheter (20 @ 14:02)    Specimen Source: Cath Tip TLC Tip of catheter    Culture Results:   Culture in progress      RADIOLOGY & ADDITIONAL STUDIES:

## 2020-04-15 NOTE — PROGRESS NOTE ADULT - REASON FOR ADMISSION
shortness of breath
COVID
Covid
resp failure s/t COVID
shortness of breath
shortness of breath s/t covid
shortness of breath

## 2020-04-15 NOTE — PROGRESS NOTE ADULT - ASSESSMENT
IMPRESSION:  Candida albicans fungemia likely secondary to UTI vs line infection.  Urine culture has C. albicans but it came from a stanley so unclear if that was the source.      Of note he continues to have fevers, high oxygen requirements and thick secretions from vent.  Sputum culture with 2 gram negative bacteria --> suspect VAP    Recommend:  1.  Continue Caspofungin 50 mg IV daily  2.  Follow up sensitivities   3.  Follow up blood and cath tip cultures  4.  Given + sputum cultures, secretions on vent and fevers, can start Ceftriaxone 1 gram IV daily to cover E. Coli and klebsiella  5.  Check TTE given fungemia  6.  Needs ophtho eval in one week    ID team 1 will follow

## 2020-04-15 NOTE — AIRWAY PLACEMENT NOTE ADULT - POST AIRWAY PLACEMENT ASSESSMENT:
breath sounds bilateral/breath sounds equal/CXR pending/skin color improved/chest excursion noted/positive end tidal CO2 noted
chest excursion noted/breath sounds equal/positive end tidal CO2 noted/skin color improved/CXR pending/breath sounds bilateral

## 2020-04-15 NOTE — PROGRESS NOTE ADULT - ASSESSMENT
75 year old M, PMHx  COPD (no hx exacerbations or intubations), CAD s/p multiple stents, hypertension who presented to [facility] ED on 4/2 due to SOB, cough, and fever for 1 week. Of note he had followed with Dr. Mera as an outpatient and completed a 5 day course of azithromycin and cefdinir prior to presentation. He was admitted for hypoxemic respiratory failure and respiratory distress and tested positive for COVID. The patient was intubated on 4/3 (current vent day 7). He completed course of plaquenil and azithromycin on 4/7. During his admission he was on vanc and zosyn for presumed HAP which was discontinued but restarted  on 4/8 for empiric coverage given new fever, elevated WBC count. Overnight patient was prone. This morning  ETTube reposition. Blood cultures from 4/12 +yeast.     1. Neurovascular: No delirium  -Intubation requiring sedation  -Drips:  fentanyl, propofol,nimbex  seroquel 50mg BID  -PRNs:  -Monitor neuro status    2. Respiratory: Acute hypoxemic respiratory failure 2/2 COVID.    -Date Intubated:4/3  -COVID pneumonia:     -Plaquenil 400mg BID x 2 doses then 200mg BID x 4 days  (completed 4/7)    -Azithromycin 250mg QD for hospital course, max 14-day dose (completed 4/7)     -Tocilizumab 8mg/kg for one dose if approved (not given)    -Ascorbic Acid and Thiamine  -Vent settings: 80%/rr30/400/peep 10  -Position: supine  -CXR:  -Continue vent weaning as tolerated    3. Cardiovascular: Pressor requirement 2/2 sedation/pain regimen.   -Daily EKG's to assess for QT prolongation    -QTc:   - plavix OD  -Monitor HR/BP/Tele    4. GI: NPO  -Nutrition: Jevity 1.5 @24cc/hr  -Prophylaxis: Pepcid  -C/w bowel regimen  -check pre albumin in AM  -last BM 4/13    5. /Renal: +Villa  -BUN/Cr: 53/1.46  -Trend Cr on AM labs  -Monitor UOP  -Replete electrolytes as needed for goal K+ 4.0, Phos 3.0, Mg 2.0    6. ID:   -WBC:   17.5  --4/12 blood cultures w/gram + yeast  --DC vanco  --Caspofungin 50mg daily per ID recs  -Continue with regular surveillance labs including CBC with diff, CMP, Mg, Phos, CRP, ABG, Ferritin, CK, Triglycerides, Procalcitonin, D-Dimer, EKG  -Additional labs q3d: ESR, T-cell subset, LDH, Ferritin, CK, Troponin, Coags  -COVID isolation protocol     7. Endo:  insulin ss    8. Heme:   -H/H: 9.6/30  -Continue to monitor on daily and q3d labs as above.  -DVT ppx: Lovenox 40mg OD    Full Code    Critical Care Time Spent: 70mins

## 2020-04-15 NOTE — AIRWAY PLACEMENT NOTE ADULT - AIRWAY COMMENTS:
Anesthesia responded STAT, patient found to be self-extubated. Patient was reintubated atraumatically with minimal improvement in O2 sat and VS. Code continued, patient care per ICU team.

## 2020-04-15 NOTE — DISCHARGE NOTE FOR THE EXPIRED PATIENT - OTHER SIGNIFICANT FINDINGS
called for ETT dislodged. anesthesia attending immediately at bedside to reintubate. after intubation, bradycardia and hypotension and desaturation requiring epi am ivp. ETT again assessed using glidescope and position confirmed. 1 round CPR done with further epi given for severe bradycardia. subsequent nsr and bp obtaiend , however persistent low sat. pO2 in low 30's via sunshine. new sunshine inserted as previous sunshine was dampened. CXR no significant change infiltrates, no ptx. over time recurrent and persistent bradycardia with no improvement in oxygenation and deterioration to asystole. next of kin contacted and patiently subsequently declared .   >40 min critical care time

## 2020-04-15 NOTE — PROGRESS NOTE ADULT - SUBJECTIVE AND OBJECTIVE BOX
Patient Discussed on Morning Rounds with Dr. Bower    Primary Diagnosis: COVID Pneumonia    SUBJECTIVE:  75yMalee with PMH of CAD s/p stent and COPD, Covid positive, intubated on 4/3. Continues to be proned. + yeast in blood cultures  Interval Events:    OBJECTIVE:  Vitals: ICU Vital Signs Last 24 Hrs  T(C): 37.8 (15 Apr 2020 09:00), Max: 38.4 (14 Apr 2020 18:00)  T(F): 100 (15 Apr 2020 09:00), Max: 101.2 (14 Apr 2020 18:00)  HR: 124 (15 Apr 2020 12:00) (101 - 125)  BP: 176/90 (15 Apr 2020 10:00) (103/60 - 176/90)  BP(mean): 124 (15 Apr 2020 10:00) (77 - 124)  ABP: 129/73 (15 Apr 2020 12:00) (80/44 - 178/79)  ABP(mean): 94 (15 Apr 2020 12:00) (59 - 117)  RR: 32 (15 Apr 2020 12:00) (30 - 32)  SpO2: 97% (15 Apr 2020 12:00) (88% - 100%)    I&O:  I&O's Detail    14 Apr 2020 07:01  -  15 Apr 2020 07:00  --------------------------------------------------------  IN:    Albumin 5%  - 250 mL: 250 mL    cisatracurium Infusion: 341.2 mL    fentaNYL Infusion.: 357 mL    fentaNYL Infusion.: 147 mL    IV PiggyBack: 450 mL    ns in tub fed  [id number]: 518 mL    Packed Red Blood Cells: 330 mL    propofol Infusion: 355.2 mL    vasopressin Infusion: 14.8 mL  Total IN: 2763.2 mL    OUT:    Indwelling Catheter - Urethral: 2400 mL  Total OUT: 2400 mL    Total NET: 363.2 mL      15 Apr 2020 07:01  -  15 Apr 2020 13:49  --------------------------------------------------------  IN:    cisatracurium Infusion: 18 mL    fentaNYL Infusion.: 105 mL    ns in tub fed  [id number]: 48 mL    propofol Infusion: 72 mL    vasopressin Infusion: 4.2 mL  Total IN: 247.2 mL    OUT:    Indwelling Catheter - Urethral: 1350 mL  Total OUT: 1350 mL    Total NET: -1102.8 mL        VENTILATOR SETTINGS:  Mode: AC/ CMV (Assist Control/ Continuous Mandatory Ventilation)  RR (machine): 32  TV (machine): 380  FiO2: 100  PEEP: 10  ITime: 1  MAP: 21  PIP: 34    ABG - ( 15 Apr 2020 10:51 )  pH, Arterial: 7.23  pH, Blood: x     /  pCO2: 62    /  pO2: 61    / HCO3: 26    / Base Excess: -2.6  /  SaO2: 89          PHYSICAL EXAM: Physical Exam performed by Intensivist to minimize risk of exposure.  Please refer to Attending Attestation for comprehensive exam.   General: No acute distress  Neuro: [Sedated]  CV: RRR  Pulm: [INTUBATED]  : [Villa]  Psych: affect appropriate    LABS:                         9.6    13.24 )-----------( 286      ( 15 Apr 2020 04:11 )             30.4   04-15    146<H>  |  112<H>  |  53<H>  ----------------------------<  113<H>  5.2   |  24  |  1.46<H>    Ca    8.6      15 Apr 2020 04:11  Phos  4.0     04-15  Mg     2.5     04-15    TPro  6.0  /  Alb  2.4<L>  /  TBili  1.8<H>  /  DBili  x   /  AST  50<H>  /  ALT  60<H>  /  AlkPhos  116  04-15    Ferritin, Serum: 1918 ng/mL (04-15-20 @ 04:11)  ABG - ( 15 Apr 2020 10:51 )  pH, Arterial: 7.23  pH, Blood: x     /  pCO2: 62    /  pO2: 61    / HCO3: 26    / Base Excess: -2.6  /  SaO2: 89        POCT Blood Glucose.: 156 mg/dL (15 Apr 2020 13:30)  POCT Blood Glucose.: 174 mg/dL (15 Apr 2020 06:59)  POCT Blood Glucose.: 102 mg/dL (14 Apr 2020 21:57)  POCT Blood Glucose.: 112 mg/dL (14 Apr 2020 16:40)     MEDICATIONS  (STANDING):  bisacodyl 5 milliGRAM(s) Oral at bedtime  caspofungin IVPB 50 milliGRAM(s) IV Intermittent every 24 hours  chlorhexidine 0.12% Liquid 15 milliLiter(s) Oral Mucosa every 12 hours  chlorhexidine 2% Cloths 1 Application(s) Topical <User Schedule>  cisatracurium Infusion 2 MICROgram(s)/kG/Min (7.2 mL/Hr) IV Continuous <Continuous>  clopidogrel Tablet 75 milliGRAM(s) Oral daily  dextrose 5%. 1000 milliLiter(s) (50 mL/Hr) IV Continuous <Continuous>  dextrose 50% Injectable 12.5 Gram(s) IV Push once  dextrose 50% Injectable 25 Gram(s) IV Push once  dextrose 50% Injectable 25 Gram(s) IV Push once  enoxaparin Injectable 40 milliGRAM(s) SubCutaneous every 24 hours  famotidine Injectable 20 milliGRAM(s) IV Push every 12 hours  fentaNYL   Infusion. 0.5 MICROgram(s)/kG/Hr (3 mL/Hr) IV Continuous <Continuous>  insulin lispro (HumaLOG) corrective regimen sliding scale   SubCutaneous every 6 hours  metoprolol tartrate Injectable 2.5 milliGRAM(s) IV Push once  petrolatum Ophthalmic Ointment 1 Application(s) Both EYES three times a day  propofol Infusion 5 MICROgram(s)/kG/Min (1.8 mL/Hr) IV Continuous <Continuous>  QUEtiapine 50 milliGRAM(s) Oral two times a day  senna 2 Tablet(s) Oral at bedtime  vasopressin Infusion 0.017 Unit(s)/Min (1 mL/Hr) IV Continuous <Continuous>    MEDICATIONS  (PRN):  acetaminophen    Suspension .. 650 milliGRAM(s) Oral every 6 hours PRN Temp greater or equal to 38C (100.4F)  ALBUTerol    90 MICROgram(s) HFA Inhaler 2 Puff(s) Inhalation every 4 hours PRN Shortness of Breath and/or Wheezing  dextrose 40% Gel 15 Gram(s) Oral once PRN Blood Glucose LESS THAN 70 milliGRAM(s)/deciliter  glucagon  Injectable 1 milliGRAM(s) IntraMuscular once PRN Glucose LESS THAN 70 milligrams/deciliter      RADIOLOGY/OTHER STUDIES:    < from: Xray Chest 1 View- PORTABLE-Routine (04.15.20 @ 10:51) >  FINDINGS: There are multiple central lines and support catheters redemonstrated with interval advancement of the tip of the endotracheal tube which now lies 3.2 cm proximal to the ashkan. Bilateral airspace opacities are redemonstrated, unchanged from 4/15/2020 however increased from 4/11/2020. Persistent cardiomegaly.    IMPRESSION:  More central repositioning of the tip of the endotracheal tube which lies approximately 3.2 cm proximal to the ashkan.  No change from examination performed earlier in the day however interval increase in diffuse bilateral airspace opacities compared to 4/11/2020..    < end of copied text >

## 2020-04-15 NOTE — DISCHARGE NOTE FOR THE EXPIRED PATIENT - HOSPITAL COURSE
75M PMHx COPD, HTN, CAD s/p multiple stents, a/w dyspnea, cough and fever x 1 week. 75M PMHx COPD, HTN, CAD s/p multiple stents, a/w dyspnea, cough and fever x 1 week. As an outpatient, completed a course of azithromycin and cefdinir with worsening of symptoms. In ED, tachypneic to 38, desat to 80s on 6L NC, placed on NRB with improvement to 93%. Started on solumedrol, thiamine, vitamin C, Plaquenil with . CXR with bilateral patchy consolidations, COVID swab positive. Intubated for acute hypoxic respiratory failure on 4/3, required nitric oxide. Paralyzed with Nimbex on 4/4. Intermittently proned, on 4/6, 4/9, 4/11, 4/12 and 4/13. Given vancomycin and Zosyn for empiric coverage of sepsis; blood cultures with coagulase negative staph on 4/11, Zosyn stopped. Yeast grew on blood cultures 4/13, started caspofungin. Anticoagulated with SQL, switched to HSQ due to JUANITA. On 4/15, tachycardia to 130s, SBP 220s, SPO2 80% on FiO2 90%. Increased FiO2 100%, febrile 101. MICU intensivist and anesthesiologist at bedside by 9pm. Discussed giving labetalol; however, ETT noticed to be dislodged. While setting up for reintubation, HR decreased to 50s, SBP 60s-70s, SPO2 decreased to 4% while bagging with 100%. Intubated, ETT visualized through vocal cords, end tidal CO2 identified, breath sounds heard equally. 9:09pm lost pulse, compressions started, 1mg epinephrine given. 9 pulse check with sinus tachycardia in 120s, given 1 amp bicarb. Sats improved to 30s-40s with bagging. POCUS without evidence of pneumothorax. 9:30pm x-ray confirms no pneumothorax, peak pressures 70s, abdomen significantly distended. Family updated via phone, continuing to request full code. 9:56pm lost pulse, started chest compressions, 1mg epinephrine given. 10:00pm regained pulse, monitor with sinus bradycardia in 20s. 10:02pm lost pulse, time of death called. Family updated.

## 2020-04-16 LAB
-  AMIKACIN: SIGNIFICANT CHANGE UP
-  AMPICILLIN/SULBACTAM: SIGNIFICANT CHANGE UP
-  AMPICILLIN: SIGNIFICANT CHANGE UP
-  CEFAZOLIN: SIGNIFICANT CHANGE UP
-  CEFTRIAXONE: SIGNIFICANT CHANGE UP
-  GENTAMICIN: SIGNIFICANT CHANGE UP
-  MEROPENEM: SIGNIFICANT CHANGE UP
-  PIPERACILLIN/TAZOBACTAM: SIGNIFICANT CHANGE UP
-  TOBRAMYCIN: SIGNIFICANT CHANGE UP
-  TRIMETHOPRIM/SULFAMETHOXAZOLE: SIGNIFICANT CHANGE UP
CULTURE RESULTS: SIGNIFICANT CHANGE UP
GRAM STN FLD: SIGNIFICANT CHANGE UP
METHOD TYPE: SIGNIFICANT CHANGE UP
SAO2 % BLDA: 36 % — LOW (ref 95–100)
SPECIMEN SOURCE: SIGNIFICANT CHANGE UP

## 2020-04-16 NOTE — PROCEDURE NOTE - NSINFORMCONSENT_GEN_A_CORE
Benefits, risks, and possible complications of procedure explained to patient/caregiver who verbalized understanding and gave verbal consent.
This was an emergent procedure.

## 2020-04-16 NOTE — PROCEDURE NOTE - NSPROCDETAILS_GEN_ALL_CORE
guidewire recovered/sterile dressing applied/sterile technique, catheter placed/ultrasound guidance/lumen(s) aspirated and flushed
connected to a pressurized flush line/location identified, draped/prepped, sterile technique used, needle inserted/introduced/positive blood return obtained via catheter/hemostasis with direct pressure, dressing applied/Seldinger technique/sutured in place/all materials/supplies accounted for at end of procedure
ultrasound guidance/sterile dressing applied/sterile technique, catheter placed/guidewire recovered/lumen(s) aspirated and flushed
sutured in place/location identified, draped/prepped, sterile technique used, needle inserted/introduced/positive blood return obtained via catheter/all materials/supplies accounted for at end of procedure/ultrasound guidance/connected to a pressurized flush line

## 2020-04-16 NOTE — PROCEDURE NOTE - NSINDICATIONS_GEN_A_CORE
critical illness
respiratory distress
critical patient
cannulation purposes/monitoring purposes
critical illness/venous access
monitoring purposes

## 2020-04-16 NOTE — PROCEDURE NOTE - NSTOLERANCE_GEN_A_CORE
Patient tolerated procedure well.
pt undergoing acls
Patient tolerated procedure well.
Patient tolerated procedure well.

## 2020-04-17 LAB
-  AMIKACIN: SIGNIFICANT CHANGE UP
-  AMIKACIN: SIGNIFICANT CHANGE UP
-  AMPICILLIN/SULBACTAM: SIGNIFICANT CHANGE UP
-  AMPICILLIN/SULBACTAM: SIGNIFICANT CHANGE UP
-  AMPICILLIN: SIGNIFICANT CHANGE UP
-  AMPICILLIN: SIGNIFICANT CHANGE UP
-  CEFAZOLIN: SIGNIFICANT CHANGE UP
-  CEFTRIAXONE: SIGNIFICANT CHANGE UP
-  CEFTRIAXONE: SIGNIFICANT CHANGE UP
-  CIPROFLOXACIN: SIGNIFICANT CHANGE UP
-  CIPROFLOXACIN: SIGNIFICANT CHANGE UP
-  CLINDAMYCIN: SIGNIFICANT CHANGE UP
-  ERYTHROMYCIN: SIGNIFICANT CHANGE UP
-  GENTAMICIN: SIGNIFICANT CHANGE UP
-  GENTAMICIN: SIGNIFICANT CHANGE UP
-  LINEZOLID: SIGNIFICANT CHANGE UP
-  MEROPENEM: SIGNIFICANT CHANGE UP
-  MEROPENEM: SIGNIFICANT CHANGE UP
-  OXACILLIN: SIGNIFICANT CHANGE UP
-  PIPERACILLIN/TAZOBACTAM: SIGNIFICANT CHANGE UP
-  PIPERACILLIN/TAZOBACTAM: SIGNIFICANT CHANGE UP
-  RIFAMPIN: SIGNIFICANT CHANGE UP
-  TOBRAMYCIN: SIGNIFICANT CHANGE UP
-  TOBRAMYCIN: SIGNIFICANT CHANGE UP
-  TRIMETHOPRIM/SULFAMETHOXAZOLE: SIGNIFICANT CHANGE UP
-  VANCOMYCIN: SIGNIFICANT CHANGE UP
CULTURE RESULTS: SIGNIFICANT CHANGE UP
GRAM STN FLD: SIGNIFICANT CHANGE UP
METHOD TYPE: SIGNIFICANT CHANGE UP
ORGANISM # SPEC MICROSCOPIC CNT: SIGNIFICANT CHANGE UP
SPECIMEN SOURCE: SIGNIFICANT CHANGE UP

## 2020-04-19 LAB
-  AMIKACIN: SIGNIFICANT CHANGE UP
-  AMPICILLIN/SULBACTAM: SIGNIFICANT CHANGE UP
-  AMPICILLIN: SIGNIFICANT CHANGE UP
-  CEFAZOLIN: SIGNIFICANT CHANGE UP
-  CEFTRIAXONE: SIGNIFICANT CHANGE UP
-  CIPROFLOXACIN: SIGNIFICANT CHANGE UP
-  GENTAMICIN: SIGNIFICANT CHANGE UP
-  MEROPENEM: SIGNIFICANT CHANGE UP
-  PIPERACILLIN/TAZOBACTAM: SIGNIFICANT CHANGE UP
-  TOBRAMYCIN: SIGNIFICANT CHANGE UP
-  TRIMETHOPRIM/SULFAMETHOXAZOLE: SIGNIFICANT CHANGE UP
CULTURE RESULTS: SIGNIFICANT CHANGE UP
METHOD TYPE: SIGNIFICANT CHANGE UP
METHOD TYPE: SIGNIFICANT CHANGE UP
ORGANISM # SPEC MICROSCOPIC CNT: SIGNIFICANT CHANGE UP
SPECIMEN SOURCE: SIGNIFICANT CHANGE UP

## 2020-04-23 DIAGNOSIS — B49 UNSPECIFIED MYCOSIS: ICD-10-CM

## 2020-04-23 DIAGNOSIS — Z88.2 ALLERGY STATUS TO SULFONAMIDES: ICD-10-CM

## 2020-04-23 DIAGNOSIS — J12.89 OTHER VIRAL PNEUMONIA: ICD-10-CM

## 2020-04-23 DIAGNOSIS — Z78.1 PHYSICAL RESTRAINT STATUS: ICD-10-CM

## 2020-04-23 DIAGNOSIS — J96.01 ACUTE RESPIRATORY FAILURE WITH HYPOXIA: ICD-10-CM

## 2020-04-23 DIAGNOSIS — J44.1 CHRONIC OBSTRUCTIVE PULMONARY DISEASE WITH (ACUTE) EXACERBATION: ICD-10-CM

## 2020-04-23 DIAGNOSIS — R00.1 BRADYCARDIA, UNSPECIFIED: ICD-10-CM

## 2020-04-23 DIAGNOSIS — Z86.73 PERSONAL HISTORY OF TRANSIENT ISCHEMIC ATTACK (TIA), AND CEREBRAL INFARCTION WITHOUT RESIDUAL DEFICITS: ICD-10-CM

## 2020-04-23 DIAGNOSIS — R06.02 SHORTNESS OF BREATH: ICD-10-CM

## 2020-04-23 DIAGNOSIS — I50.9 HEART FAILURE, UNSPECIFIED: ICD-10-CM

## 2020-04-23 DIAGNOSIS — A41.89 OTHER SPECIFIED SEPSIS: ICD-10-CM

## 2020-04-23 DIAGNOSIS — Z95.5 PRESENCE OF CORONARY ANGIOPLASTY IMPLANT AND GRAFT: ICD-10-CM

## 2020-04-23 DIAGNOSIS — N17.9 ACUTE KIDNEY FAILURE, UNSPECIFIED: ICD-10-CM

## 2020-04-23 DIAGNOSIS — U07.1 COVID-19: ICD-10-CM

## 2020-04-23 DIAGNOSIS — E87.6 HYPOKALEMIA: ICD-10-CM

## 2020-04-23 DIAGNOSIS — I25.10 ATHEROSCLEROTIC HEART DISEASE OF NATIVE CORONARY ARTERY WITHOUT ANGINA PECTORIS: ICD-10-CM

## 2020-04-23 DIAGNOSIS — N40.0 BENIGN PROSTATIC HYPERPLASIA WITHOUT LOWER URINARY TRACT SYMPTOMS: ICD-10-CM

## 2020-04-23 DIAGNOSIS — I95.9 HYPOTENSION, UNSPECIFIED: ICD-10-CM

## 2020-04-23 DIAGNOSIS — E78.5 HYPERLIPIDEMIA, UNSPECIFIED: ICD-10-CM

## 2020-04-23 DIAGNOSIS — E87.2 ACIDOSIS: ICD-10-CM

## 2020-04-23 DIAGNOSIS — I11.0 HYPERTENSIVE HEART DISEASE WITH HEART FAILURE: ICD-10-CM

## 2020-05-10 LAB
CULTURE RESULTS: SIGNIFICANT CHANGE UP
CULTURE RESULTS: SIGNIFICANT CHANGE UP
SPECIMEN SOURCE: SIGNIFICANT CHANGE UP

## 2020-05-12 ENCOUNTER — APPOINTMENT (OUTPATIENT)
Dept: HEART AND VASCULAR | Facility: CLINIC | Age: 75
End: 2020-05-12

## 2020-08-20 PROBLEM — Z87.19 HISTORY OF CALCULUS OF GALLBLADDER: Status: RESOLVED | Noted: 2018-06-12 | Resolved: 2020-08-20

## 2022-06-06 NOTE — INPATIENT CERTIFICATION FOR MEDICARE PATIENTS - IN ORDER TO MEET MEDICARE REQUIREMENTS.
----- Message from Minnie Rodríguez sent at 6/6/2022  2:18 PM CDT -----  Contact: patient  Type: Needs Medical Advice  Who Called:  patient  Best Call Back Number: [phone] (home)   Additional Information: patient is requesting a call back to discuss details for procedure and the test he needs to take. He lost the paper that he wrote it on. Please advise.         
Spoke w pt and reminded we previously spoke regarding scheduled psa lab and him needing to schedule with rad onc pt voiced ok   
In order to meet Medicare requirements, the clinical documentation must support the information cited in the admission order.  Please be sure to provide detailed and clear documentation about the following in the admitting note/history and physical:

## 2023-12-11 NOTE — PHYSICAL THERAPY INITIAL EVALUATION ADULT - NS ASR RISK AREAS PT EVAL
Last OV:12/05/2023.   Next OV:04/16/2024  Last Refill:05/30/2023  Last (labs):10/31/2023    -*Insert any notes here*  Requested Prescriptions     Pending Prescriptions Disp Refills    vitamin D (CHOLECALCIFEROL) 25 MCG (1000 UT) TABS tablet 90 tablet 3     Sig: Take 2 tablets by mouth daily fall

## 2024-01-30 NOTE — ED ADULT NURSE NOTE - CAS TRG GENERAL AIRWAY, MLM
Patient arrived to infusion suite for 1L NS bolus over 1 hr as ordered. Patient tolerated well without difficulty. Return appointment provided.     Limited head-to-toe assessment due to privacy issues and visit reason though the opportunity was given for patient to express any concerns.                                                           Patent

## 2024-06-22 NOTE — ED ADULT NURSE NOTE - NS PRO PASSIVE SMOKE EXP
Department of Anesthesiology  Preprocedure Note       Name:  Abelardo Marquis   Age:  84 y.o.  :  1940                                          MRN:  [id number]         Date:  2024      Surgeon: Surgeon(s):  Navi Castillo MD    Procedure: Procedure(s):  ENTEROSCOPY PUSH DIAGNOSTIC    Medications prior to admission:   Prior to Admission medications    Medication Sig Start Date End Date Taking? Authorizing Provider   ranolazine (RANEXA) 500 MG extended release tablet Take 1 tablet by mouth daily 24  Nirav Keen MD   aspirin 81 MG chewable tablet Take 1 tablet by mouth daily 24   Nirav Keen MD   ferrous sulfate (IRON 325) 325 (65 Fe) MG tablet Take 1 tablet by mouth every other day 24  Nirav Keen MD   pantoprazole (PROTONIX) 40 MG tablet Take 1 tablet by mouth 2 times daily (before meals) 24   Marcia Mathews MD   metoprolol tartrate (LOPRESSOR) 25 MG tablet Take 1 tablet by mouth 2 times daily 24   Juan Luis Hamm MD   isosorbide mononitrate (IMDUR) 30 MG extended release tablet Take 1 tablet by mouth daily 24   Juan Luis Hamm MD   tiZANidine (ZANAFLEX) 2 MG tablet Take 1 tablet by mouth 2 times daily as needed 24   Tj Morris MD   VICTOZA 18 MG/3ML SOPN SC injection Inject 1.2 mg into the skin Daily with lunch 24   Tj Morris MD   gabapentin (NEURONTIN) 800 MG tablet Take 1 tablet by mouth 3 times daily. 24   Tj Morris MD   insulin glargine (BASAGLAR KWIKPEN) 100 UNIT/ML injection pen Inject 40-50 Units into the skin 2 times daily Patient reports he does sliding scale    Tj Morris MD   FLUoxetine (PROZAC) 40 MG capsule Take 1 capsule by mouth daily    Tj Morris MD   empagliflozin (JARDIANCE) 25 MG tablet Take 1 tablet by mouth daily    Tj Morris MD   metFORMIN (GLUCOPHAGE) 500 MG tablet Take 1 tablet by mouth 2 times 
at 06/21/24 0609   • acetaminophen (TYLENOL) tablet 650 mg  650 mg Oral Q6H PRN Vishal Sol MD        Or   • acetaminophen (TYLENOL) suppository 650 mg  650 mg Rectal Q6H PRN Vishal Sol MD       • pantoprazole (PROTONIX) 40 mg in sodium chloride (PF) 0.9 % 10 mL injection  40 mg IntraVENous BID Vishal Sol MD   40 mg at 06/21/24 2029   • sodium chloride 0.9 % bolus 500 mL  500 mL IntraVENous Once Vishal Sol MD           Allergies:    Allergies   Allergen Reactions   • Sulfa Antibiotics Hives       Problem List:    Patient Active Problem List   Diagnosis Code   • Essential hypertension, benign I10   • Type 2 diabetes mellitus, with long-term current use of insulin (HCC) E11.9, Z79.4   • Other and unspecified hyperlipidemia E78.5   • Chest pain R07.9   • Coronary atherosclerosis I25.10   • Chronic kidney disease, stage III (moderate) (HCC) N18.30   • Cellulitis of leg L03.119   • Cellulitis of lower leg L03.119   • Lethargy R53.83   • Acute respiratory failure with hypoxia and hypercapnia (HCC) J96.01, J96.02   • Altered mental status, unspecified R41.82   • Hypoglycemia E16.2   • Acute kidney injury (HCC) N17.9   • Generalized weakness R53.1   • Liver cirrhosis (HCC) K74.60   • Depression F32.A   • Chronic pain G89.29   • Encephalopathy G93.40   • COVID-19 U07.1   • Acute hypoxemic respiratory failure (HCC) J96.01   • Acute anemia D64.9   • Hypotension I95.9   • GI bleed K92.2       Past Medical History:        Diagnosis Date   • Arthritis    • Diabetes mellitus (HCC)    • GERD (gastroesophageal reflux disease)    • Gout     left ankle and right shoulder   • Hyperlipidemia    • Hypertension    • MI (myocardial infarction) (HCC)     May 2013       Past Surgical History:        Procedure Laterality Date   • BACK SURGERY     • CARDIAC SURGERY      stents x 1   • CHOLECYSTECTOMY      May 2013   • COLONOSCOPY N/A 5/4/2024    COLONOSCOPY POLYPECTOMY SNARE BIOPSY performed by Navi Castillo MD at [facility] ENDOSCOPY   • 
No

## 2025-01-21 NOTE — DISCHARGE NOTE FOR THE EXPIRED PATIENT - NS PRELIMINARY CAUSE OF DEATH_RESTRICTED
Please review the summary from your postoperative appointment following your procedure with Dr. Garrett.      Please follow up in 6 weeks with DR GARRETT. An X-ray will be done the same day of your next appointment.     Please continue to advance activity as tolerated.       Physical Therapy typically consists 2-3x a week for 4-6 weeks.      Please continue to avoid NSAIDs (i.e. Ibuprofen, aleve, meloxicam, mobic) for 6 months postoperatively.     To request a refill of your prescription please call the office 2-3 days before your medication will run out. Our office hours are Monday-Friday 8:30-5:00pm.   All prescriptions will be filled within 48 hours. If a refill request is received on a Friday after 4:00 PM it will be completed by 12:00 PM the following Monday.     If there are any questions, concerns, or changes in symptoms please notify your RN, Katy Corbin, at [phone]     We would love to hear from you! The most valuable feedback comes from our patients.       Cardiopulmonary Arrest

## 2025-05-17 NOTE — INPATIENT CERTIFICATION FOR MEDICARE PATIENTS - NS ICMP CERT SIG IND
Arrives ambulatory with c/o pain to right knee for the past month and swelling.  Denies injury.  Alert and oriented.   I certify as stated above.